# Patient Record
Sex: FEMALE | Race: WHITE | NOT HISPANIC OR LATINO | Employment: FULL TIME | URBAN - METROPOLITAN AREA
[De-identification: names, ages, dates, MRNs, and addresses within clinical notes are randomized per-mention and may not be internally consistent; named-entity substitution may affect disease eponyms.]

---

## 2017-01-27 ENCOUNTER — OFFICE VISIT (OUTPATIENT)
Dept: RHEUMATOLOGY | Facility: CLINIC | Age: 47
End: 2017-01-27
Payer: COMMERCIAL

## 2017-01-27 VITALS
HEART RATE: 105 BPM | SYSTOLIC BLOOD PRESSURE: 132 MMHG | DIASTOLIC BLOOD PRESSURE: 82 MMHG | HEIGHT: 69 IN | TEMPERATURE: 98 F | BODY MASS INDEX: 28.46 KG/M2 | WEIGHT: 192.13 LBS

## 2017-01-27 DIAGNOSIS — E55.9 VITAMIN D DEFICIENCY: ICD-10-CM

## 2017-01-27 DIAGNOSIS — R76.8 RHEUMATOID FACTOR POSITIVE: ICD-10-CM

## 2017-01-27 DIAGNOSIS — M79.645 BILATERAL THUMB PAIN: Primary | ICD-10-CM

## 2017-01-27 DIAGNOSIS — M25.572 CHRONIC PAIN OF BOTH ANKLES: ICD-10-CM

## 2017-01-27 DIAGNOSIS — G89.29 CHRONIC PAIN OF BOTH ANKLES: ICD-10-CM

## 2017-01-27 DIAGNOSIS — R53.83 FATIGUE, UNSPECIFIED TYPE: ICD-10-CM

## 2017-01-27 DIAGNOSIS — M25.571 CHRONIC PAIN OF BOTH ANKLES: ICD-10-CM

## 2017-01-27 DIAGNOSIS — M79.644 BILATERAL THUMB PAIN: Primary | ICD-10-CM

## 2017-01-27 PROCEDURE — 99999 PR PBB SHADOW E&M-EST. PATIENT-LVL III: CPT | Mod: PBBFAC,,, | Performed by: INTERNAL MEDICINE

## 2017-01-27 PROCEDURE — 1159F MED LIST DOCD IN RCRD: CPT | Mod: S$GLB,,, | Performed by: INTERNAL MEDICINE

## 2017-01-27 PROCEDURE — 99214 OFFICE O/P EST MOD 30 MIN: CPT | Mod: S$GLB,,, | Performed by: INTERNAL MEDICINE

## 2017-01-27 ASSESSMENT — ROUTINE ASSESSMENT OF PATIENT INDEX DATA (RAPID3)
PAIN SCORE: 4
WHEN YOU AWAKENED IN THE MORNING OVER THE LAST WEEK, PLEASE INDICATE THE AMOUNT OF TIME IT TAKES UNTIL YOU ARE AS LIMBER AS YOU WILL BE FOR THE DAY: 15 MINUTES
PSYCHOLOGICAL DISTRESS SCORE: 1.1
AM STIFFNESS SCORE: 1, YES
MDHAQ FUNCTION SCORE: 0
FATIGUE SCORE: 2.5

## 2017-01-27 NOTE — PROGRESS NOTES
"Subjective:       Patient ID: Kristnia Graham is a 46 y.o. female.    Chief Complaint: Hand Pain      HPI:  Kristina Graham is a 46 y.o. female History of positive RF and joint pain.  No treatment given. Returns due to joint pains. Pain bilateral thumbs and ankles.  Pain 3/10 ache. Improves on its own and worsens with movement regarding hands.  Ankles worsen at end of day (wears heels) and improves with elevation.   Morning stiffness for 1 hour. Several months of fatigue.  Difficulty falling asleep.      Left peroneal nerve compression on EMG for left toe numbness in 2011 patient declined surgery.     History of B12 deficiency, vitamin D deficiency and acne treated with spironolactone.  In 2010 Parvovirus and EBV infections. IgM positive for both.       Takes Lodine once a week and it helps hand pain 3-4/10 ache.  Worsened with activity.      New primary doctor Keli Dey MD did labs and thyroglobulin was elevated at 6.4 (0-0.9).  Had ultrasound and results pending.  Mother takes Synthroid.            Review of Systems   Constitutional: Positive for fatigue.   HENT: Negative.    Eyes:        Dry eyes   Respiratory: Negative.    Cardiovascular: Negative.    Gastrointestinal: Negative.    Endocrine: Negative.    Genitourinary: Negative.    Musculoskeletal: Positive for arthralgias.   Skin: Negative.    Allergic/Immunologic: Negative.    Neurological: Negative.    Hematological: Positive for adenopathy.   Psychiatric/Behavioral: Negative.          Objective:     Visit Vitals    /82 (BP Location: Left arm, Patient Position: Sitting, BP Method: Automatic)    Pulse 105    Temp 98.1 °F (36.7 °C) (Oral)    Ht 5' 9" (1.753 m)    Wt 87.1 kg (192 lb 1.6 oz)    BMI 28.37 kg/m2        Physical Exam   Constitutional: She is oriented to person, place, and time and well-developed, well-nourished, and in no distress.   HENT:   Head: Normocephalic and atraumatic.   Eyes: Conjunctivae and EOM are normal.   Neck: Neck " supple.   Cardiovascular: Normal rate, regular rhythm and normal heart sounds.    Pulmonary/Chest: Effort normal and breath sounds normal.   Abdominal: Soft. Bowel sounds are normal.   Neurological: She is alert and oriented to person, place, and time. Gait normal.   Skin: Skin is warm and dry.     Psychiatric: Mood and affect normal.   Musculoskeletal: She exhibits no edema, tenderness or deformity.                LABS    Component      Latest Ref Rng & Units 9/27/2016   WBC      3.90 - 12.70 K/uL 8.74   RBC      4.00 - 5.40 M/uL 4.76   Hemoglobin      12.0 - 16.0 g/dL 14.4   Hematocrit      37.0 - 48.5 % 42.8   MCV      82 - 98 fL 90   MCH      27.0 - 31.0 pg 30.3   MCHC      32.0 - 36.0 % 33.6   RDW      11.5 - 14.5 % 12.4   Platelets      150 - 350 K/uL 242   MPV      9.2 - 12.9 fL 10.1   Gran #      1.8 - 7.7 K/uL 5.8   Lymph #      1.0 - 4.8 K/uL 2.2   Mono #      0.3 - 1.0 K/uL 0.6   Eos #      0.0 - 0.5 K/uL 0.1   Baso #      0.00 - 0.20 K/uL 0.03   Gran%      38.0 - 73.0 % 65.9   Lymph%      18.0 - 48.0 % 25.5   Mono%      4.0 - 15.0 % 6.6   Eosinophil%      0.0 - 8.0 % 1.4   Basophil%      0.0 - 1.9 % 0.3   Differential Method       Automated   Sodium      136 - 145 mmol/L 137   Potassium      3.5 - 5.1 mmol/L 3.6   Chloride      95 - 110 mmol/L 102   CO2      23 - 29 mmol/L 24   Glucose      70 - 110 mg/dL 88   BUN, Bld      6 - 20 mg/dL 10   Creatinine      0.5 - 1.4 mg/dL 0.8   Calcium      8.7 - 10.5 mg/dL 9.3   Total Protein      6.0 - 8.4 g/dL 7.9   Albumin      3.5 - 5.2 g/dL 4.2   Total Bilirubin      0.1 - 1.0 mg/dL 0.8   Alkaline Phosphatase      55 - 135 U/L 59   AST      10 - 40 U/L 17   ALT      10 - 44 U/L 15   Anion Gap      8 - 16 mmol/L 11   eGFR if African American      >60 mL/min/1.73 m:2 >60.0   eGFR if non African American      >60 mL/min/1.73 m:2 >60.0   Hepatitis B Surface Ag       Negative   Hep B C IgM       Negative   Hep A IgM       Negative   Hepatitis C Ab       Negative    Anti-SSA Antibody      0.00 - 19.99 EU 0.33   Anti-SSA Interpretation      Negative Negative   Aldolase      1.2 - 7.6 U/L 2.0   YESI Screen      Negative <1:160 Negative <1:160   CPK      20 - 180 U/L 59   CRP      0.0 - 8.2 mg/L 0.8   HIV 1/2 Ag/Ab      Negative Negative   Rheumatoid Factor      0.0 - 15.0 IU/mL 18.0 (H)   Sed Rate      0 - 20 mm/Hr 16   TSH      0.400 - 4.000 uIU/mL 0.924   Vit D, 25-Hydroxy      30 - 96 ng/mL 28 (L)   ds DNA Ab      Negative 1:10 Negative 1:10   Vitamin B-12      210 - 950 pg/mL 778     Assessment:       1. Ankle pain.  Now resolved  2. Thumb pain.  Persists but improves with Lodine  3. Positive rheumatoid factor  4. Fatigue   5. Overweight (BMI 28.86).   6. Vitamin D deficiency.  Mildly low.  Currently on OTC 2,000 units daily  7. History vitamin B12 deficiency   Plan:       1. Labs  2. Continue Lodine. Risk of GI and cardiovascular side effects with NSAID use discussed with patient.      RTO 4 months/prn

## 2017-01-28 ENCOUNTER — PATIENT MESSAGE (OUTPATIENT)
Dept: RHEUMATOLOGY | Facility: CLINIC | Age: 47
End: 2017-01-28

## 2017-01-30 ENCOUNTER — PATIENT MESSAGE (OUTPATIENT)
Dept: RHEUMATOLOGY | Facility: CLINIC | Age: 47
End: 2017-01-30

## 2017-01-30 DIAGNOSIS — E04.1 THYROID NODULE: ICD-10-CM

## 2017-01-30 NOTE — TELEPHONE ENCOUNTER
Patient requesting endocrinology consult for thyroid nodule discovered by an outside provider.  We'll place consult.  Patient aware that she must bring all of the information to her endocrinology visit.  Patient aware that there may be a wait for an appointment in endocrinology.

## 2017-01-31 ENCOUNTER — TELEPHONE (OUTPATIENT)
Dept: ENDOCRINOLOGY | Facility: CLINIC | Age: 47
End: 2017-01-31

## 2017-01-31 NOTE — TELEPHONE ENCOUNTER
----- Message from Christina Krueger sent at 1/30/2017  9:13 AM CST -----  Contact: self   Pt called asking for advice about scheduling an biopsy appt. The Pt PCP Dr. Dey found a 3mm spot on her Thyroid and she does have the images from [facility] and will upload to chart. Pt was told she needs to have this done ASAP and needs the nurse to call her back to discuss further.    Pcp Dr. Dey [phone] if any questions for the provider.    Pt can be reached at [phone].

## 2017-02-02 ENCOUNTER — PATIENT MESSAGE (OUTPATIENT)
Dept: RHEUMATOLOGY | Facility: CLINIC | Age: 47
End: 2017-02-02

## 2017-02-09 ENCOUNTER — OFFICE VISIT (OUTPATIENT)
Dept: OTOLARYNGOLOGY | Facility: CLINIC | Age: 47
End: 2017-02-09
Payer: COMMERCIAL

## 2017-02-09 ENCOUNTER — LAB VISIT (OUTPATIENT)
Dept: LAB | Facility: HOSPITAL | Age: 47
End: 2017-02-09
Attending: OTOLARYNGOLOGY
Payer: COMMERCIAL

## 2017-02-09 ENCOUNTER — ANESTHESIA EVENT (OUTPATIENT)
Dept: SURGERY | Facility: HOSPITAL | Age: 47
End: 2017-02-09
Payer: COMMERCIAL

## 2017-02-09 VITALS
SYSTOLIC BLOOD PRESSURE: 140 MMHG | BODY MASS INDEX: 28.49 KG/M2 | DIASTOLIC BLOOD PRESSURE: 79 MMHG | HEART RATE: 75 BPM | WEIGHT: 192.88 LBS | TEMPERATURE: 98 F

## 2017-02-09 DIAGNOSIS — E04.2 NONTOXIC MULTINODULAR GOITER: ICD-10-CM

## 2017-02-09 LAB
ANION GAP SERPL CALC-SCNC: 7 MMOL/L
BASOPHILS # BLD AUTO: 0.04 K/UL
BASOPHILS NFR BLD: 0.4 %
BUN SERPL-MCNC: 10 MG/DL
CALCIUM SERPL-MCNC: 9.6 MG/DL
CHLORIDE SERPL-SCNC: 104 MMOL/L
CO2 SERPL-SCNC: 27 MMOL/L
CREAT SERPL-MCNC: 0.8 MG/DL
DIFFERENTIAL METHOD: NORMAL
EOSINOPHIL # BLD AUTO: 0.1 K/UL
EOSINOPHIL NFR BLD: 1.1 %
ERYTHROCYTE [DISTWIDTH] IN BLOOD BY AUTOMATED COUNT: 12.2 %
EST. GFR  (AFRICAN AMERICAN): >60 ML/MIN/1.73 M^2
EST. GFR  (NON AFRICAN AMERICAN): >60 ML/MIN/1.73 M^2
GLUCOSE SERPL-MCNC: 101 MG/DL
HCT VFR BLD AUTO: 43.4 %
HGB BLD-MCNC: 14.9 G/DL
LYMPHOCYTES # BLD AUTO: 2 K/UL
LYMPHOCYTES NFR BLD: 20.8 %
MCH RBC QN AUTO: 30.2 PG
MCHC RBC AUTO-ENTMCNC: 34.3 %
MCV RBC AUTO: 88 FL
MONOCYTES # BLD AUTO: 0.6 K/UL
MONOCYTES NFR BLD: 6.3 %
NEUTROPHILS # BLD AUTO: 6.9 K/UL
NEUTROPHILS NFR BLD: 71.1 %
PLATELET # BLD AUTO: 278 K/UL
PMV BLD AUTO: 9.6 FL
POTASSIUM SERPL-SCNC: 4.4 MMOL/L
PTH-INTACT SERPL-MCNC: 44 PG/ML
RBC # BLD AUTO: 4.94 M/UL
SODIUM SERPL-SCNC: 138 MMOL/L
T4 FREE SERPL-MCNC: 1.15 NG/DL
TSH SERPL DL<=0.005 MIU/L-ACNC: 0.69 UIU/ML
WBC # BLD AUTO: 9.74 K/UL

## 2017-02-09 PROCEDURE — 99204 OFFICE O/P NEW MOD 45 MIN: CPT | Mod: S$GLB,,, | Performed by: OTOLARYNGOLOGY

## 2017-02-09 PROCEDURE — 36415 COLL VENOUS BLD VENIPUNCTURE: CPT

## 2017-02-09 PROCEDURE — 99999 PR PBB SHADOW E&M-EST. PATIENT-LVL IV: CPT | Mod: PBBFAC,,, | Performed by: OTOLARYNGOLOGY

## 2017-02-09 PROCEDURE — 83970 ASSAY OF PARATHORMONE: CPT

## 2017-02-09 PROCEDURE — 84439 ASSAY OF FREE THYROXINE: CPT

## 2017-02-09 PROCEDURE — 85025 COMPLETE CBC W/AUTO DIFF WBC: CPT

## 2017-02-09 PROCEDURE — 80048 BASIC METABOLIC PNL TOTAL CA: CPT

## 2017-02-09 PROCEDURE — 84443 ASSAY THYROID STIM HORMONE: CPT

## 2017-02-09 RX ORDER — LIDOCAINE HYDROCHLORIDE 10 MG/ML
1 INJECTION, SOLUTION EPIDURAL; INFILTRATION; INTRACAUDAL; PERINEURAL ONCE
Status: CANCELLED | OUTPATIENT
Start: 2017-02-09 | End: 2017-02-09

## 2017-02-09 NOTE — LETTER
February 9, 2017      Flo Herman MD  [address] [place] [address] [address]  [address] [place] [address]           [facility]  [address]  [facility] [address]  Phone: [phone]  Fax: [phone]          Patient: Kristina Graham   MR Number: [id number]   YOB: 1970   Date of Visit: 2/9/2017       Dear Dr. Flo Herman:    Thank you for referring Kristina Graham to me for evaluation. Attached you will find relevant portions of my assessment and plan of care.    If you have questions, please do not hesitate to call me. I look forward to following Kristina Graham along with you.    Sincerely,    Kris Mondragon MD    Enclosure  CC:  No Recipients    If you would like to receive this communication electronically, please contact [email or web address] or [phone] to request more information on [employer] Link access.    For providers and/or their staff who would like to refer a patient to [facility], please contact us through our one-stop-shop provider referral line, [facility], at [phone].    If you feel you have received this communication in error or would no longer like to receive these types of communications, please e-mail [email or web address]

## 2017-02-09 NOTE — PROGRESS NOTES
Subjective:       Patient ID: Kristina Graham is a 46 y.o. female.    Chief Complaint: Thyroid Problem    HPI     [facility]    CC: thyroid nodule, multinodular goiter    HISTORY OF PRESENT ILLNESS:      Kristina Graham is a 46 y.o. year-old female who has been referred by Dr. Herman for evaluation of a 1 year history of a central neck mass/goiter. She first noted a mass in the central neck in 2016. She did not have an US until 2 weeks ago. She has bilateral nodules evident on ultrasound, with a dominant nodule evident on the left (2.5cm).  She has not had an FNA. She thinks that it is not getting larger. Recently, she has had some compressive symptoms such as a globus sensation and mild dysphagia. She has trouble sleeping on her back and has to sleep propped up to be comfortable. She feels pressure on her throat as well. She thinks that her voice is occasionally hoarse. She denies odynophagia, throat pain, and otalgia. She is a non-smoker. She admits to cough and throat clearing. There is not hemoptysis or hematemesis. She does not  experience shortness of breath occasionally. There is family history of thyroid disease, but not thyroid cancer. There is no personal history of radiation exposure or treatment to the head and neck region.    Dr. Herman performed a thyroid ultrasound, revealing a large thyroid with a dominant left sided nodule measuring 3.2cm, heterogeneous vascularity, and microcalcifications.     Past Medical History   Diagnosis Date    Arthritis     History of vitamin D deficiency 2016    Rheumatoid arthritis(714.0)        Past Surgical History   Procedure Laterality Date    Abdominal surgery       section, low transverse           Current Outpatient Prescriptions:     etodolac (LODINE XL) 400 MG 24 hr tablet, Take 1 tablet (400 mg total) by mouth once daily., Disp: 90 tablet, Rfl: 1    spironolactone (ALDACTONE) 25 MG tablet, Take 25 mg by  mouth once daily.  , Disp: , Rfl:     Review of patient's allergies indicates:   Allergen Reactions    Bactrim [sulfamethoxazole-trimethoprim]        Social History     Social History    Marital status:      Spouse name: N/A    Number of children: N/A    Years of education: N/A     Occupational History    Not on file.     Social History Main Topics    Smoking status: Never Smoker    Smokeless tobacco: Not on file      Comment: drug rep    Alcohol use Yes      Comment: social    Drug use: No    Sexual activity: Yes     Partners: Male     Birth control/ protection: Surgical     Other Topics Concern    Not on file     Social History Narrative       Family History   Problem Relation Age of Onset    Cancer Mother     Diabetes Mother     Hypertension Mother     Cataracts Mother     Cancer Father     Hypertension Father     Heart disease Father     Heart disease Paternal Grandfather     Hypertension Paternal Grandfather     Hypertension Sister     Cancer Maternal Aunt     Cancer Paternal Uncle     Hypertension Paternal Uncle     Cancer Maternal Grandmother     Diabetes Maternal Grandmother     Hypertension Maternal Grandmother     Glaucoma Maternal Grandmother     Heart disease Paternal Grandmother     Hypertension Paternal Grandmother     Glaucoma Maternal Grandfather      Review of Systems   Constitutional: Negative for activity change, appetite change, chills, diaphoresis, fatigue, fever and unexpected weight change.   HENT: Positive for trouble swallowing and voice change. Negative for congestion, dental problem, drooling, ear discharge, ear pain, facial swelling, hearing loss, mouth sores, nosebleeds, postnasal drip, rhinorrhea, sinus pressure, sneezing, sore throat and tinnitus.    Eyes: Negative for pain, discharge, redness, itching and visual disturbance.   Respiratory: Positive for cough. Negative for choking and shortness of breath.    Cardiovascular: Negative for chest pain,  palpitations and leg swelling.   Gastrointestinal: Negative for abdominal distention, abdominal pain, constipation, diarrhea, nausea and vomiting.   Endocrine: Negative for cold intolerance and heat intolerance.   Genitourinary: Negative for difficulty urinating, dysuria and hematuria.   Musculoskeletal: Negative for arthralgias, back pain, gait problem, myalgias, neck pain and neck stiffness.   Skin: Negative for rash and wound.   Allergic/Immunologic: Negative for environmental allergies and immunocompromised state.   Neurological: Negative for dizziness, facial asymmetry, speech difficulty, weakness, light-headedness, numbness and headaches.   Hematological: Negative for adenopathy. Does not bruise/bleed easily.   Psychiatric/Behavioral: Negative for dysphoric mood. The patient is not nervous/anxious.        Objective:      Physical Exam   Constitutional: She is oriented to person, place, and time. She appears well-developed and well-nourished. She is cooperative. She does not appear ill. No distress.   HENT:   Head: Normocephalic and atraumatic.   Right Ear: Hearing, tympanic membrane, external ear and ear canal normal.   Left Ear: Hearing, tympanic membrane, external ear and ear canal normal.   Nose: Septal deviation (subtle twist) present. No mucosal edema, rhinorrhea or nasal deformity. No epistaxis.  No foreign bodies. Right sinus exhibits no maxillary sinus tenderness and no frontal sinus tenderness. Left sinus exhibits no maxillary sinus tenderness and no frontal sinus tenderness.   Mouth/Throat: Uvula is midline, oropharynx is clear and moist and mucous membranes are normal. Mucous membranes are not pale, not dry and not cyanotic. She does not have dentures. No oral lesions. No trismus in the jaw. Normal dentition. No uvula swelling or dental caries. No oropharyngeal exudate, posterior oropharyngeal edema, posterior oropharyngeal erythema or tonsillar abscesses.   Procedure: Flexible laryngoscopy  In  order to fully examine the upper aerodigestive tract, including the larynx, in a patient with a hyperactive gag reflex, flexible endoscopy is required.  After explaining the procedure and obtaining verbal consent, a timeout was performed with the patient's participation according to the universal protocol. Both nasal cavities were anesthetized with 4% Xylocaine spray mixed with Toan-Synephrine. The flexible laryngoscope (#[id number]) was inserted into the nasal cavity and advanced to visualize the nasal cavity, nasopharynx, the posterior oropharynx, hypopharynx, and the endolarynx with the above findings noted. The scope was removed and the procedure terminated. The patient tolerated this procedure well without apparent complication.      FINDINGS  Nasopharynx - the torus is clear. There are no lesions of the posterior wall.   Oropharynx - no lesions of the tongue base. There is no obvious fullness or asymmetry.  Hypopharynx - there are no lesions of the pyriform sinuses or postcricoid region    Larynx - there are no lesions of the supraglottic or glottic larynx. Vocal fold mobility is normal with complete closure.      Eyes: Conjunctivae are normal. Right eye exhibits no discharge. Left eye exhibits no discharge. No scleral icterus.   Neck: Trachea normal, normal range of motion and phonation normal. Neck supple. No JVD present. No tracheal tenderness present. No tracheal deviation present. Thyromegaly present. No thyroid mass present.   Salivary glands - there are no lesions or asymmetric findings in the submandibular or parotid glands     Cardiovascular: Normal rate.    Pulmonary/Chest: Effort normal. No stridor. No respiratory distress.   Lymphadenopathy:        Head (right side): No submental, no submandibular, no tonsillar and no preauricular adenopathy present.        Head (left side): No submental, no submandibular, no tonsillar and no preauricular adenopathy present.     She has no cervical adenopathy.    Neurological: She is alert and oriented to person, place, and time. No cranial nerve deficit.   Skin: Skin is warm and dry. No rash noted. She is not diaphoretic. No erythema. No pallor.   Psychiatric: She has a normal mood and affect. Her behavior is normal. Thought content normal.   Vitals reviewed.      Thyroid US and Endocrinology Notes reviewed in EPIC    Assessment and Plan:       Problem List Items Addressed This Visit     Nontoxic multinodular goiter     Based on her outside ultrasound, Ms. Garham has a dominant left sided nodule in the setting of a large, symptomatic multinodular goiter. Her options include observation, US-FNA, and surgery. We discussed the relative merits of biopsy in how it could shape a recommendation for surgery or the extent of surgery, but her large, symptomatic gland and the nodule size render FNA slightly less accurate and anything less than a total thyroidectomy fraught with potential for not improving her symptoms.    She flatly stated a desire to have a total thyroidectomy, and this is exactly what she had been counseled by Dr. Herman. I explained the risks of thyroidectomy include, but are not limited to, infection, bleeding, scarring, failure to achieve the diagnosis, no evidence of cancer, recurrence, collection of blood or tissue fluid requiring drainage, injury to the recurrent laryngeal nerve with resultant temporary or permanent hoarseness (1% permanent risk with up to 10% temporary risk, greater in revision operations), injury to the superior laryngeal nerve with resultant loss of the upper register for singing or challenges with yelling, temporary or permanent hypocalcemia related to injury or devascularization of the parathyroid glands (less than 5% permanent, up to 30-60% when paratracheal dissection is accomplished, again greater in revision operations), and the need for additional procedures or therapies.  Time was allowed for questions, and all questions were  answered to the patient's apparent satisfaction. The risks of paratracheal lymph node dissection are included above. Informed consent was obtained.             Relevant Orders    Basic metabolic panel    CBC auto differential    TSH    PTH, intact    T4, free    Case Request Operating Room: THYROIDECTOMY (Completed)

## 2017-02-09 NOTE — PRE ADMISSION SCREENING
Anesthesia Assessment: Preoperative EQUATION    Planned Procedure: Procedure(s) (LRB):  THYROIDECTOMY (Bilateral)  Requested Anesthesia Type:General  Surgeon: Kris Mondragon MD  Service: ENT  Known or anticipated Date of Surgery:2/20/2017    Surgeon notes: reviewed    Electronic QUestionnaire Assessment completed via nurse interview with patient.        Kristina Graham [[id number]] - 46 y.o. Female        Providers Outside of [facility]      No data to display       Surgical Risk Level      Surgical Risk Level:  2           caRDScore (Clinical Anesthesia Rapid Decision Score)        Low  Total Score: 15      15 Sum of Clinical Scores       caRDScores (Grouped)      caRDScore - Ane:  7                caRDScore - CVD:  0                caRDScore - Pul:  0                caRDScore - Met:  5                caRDScore - Phy:  3           caRDScore Items           Pre-admit from 2/20/2017 in [facility]     Anesthesia      Has large thyroid (goiter)  Yes     Has enlarged neck gland, lump or tumor, neck swelling  Yes     Has trouble swallowing / handling saliva  Yes     CVD      Activity similar to best ability for maximal activity or exercise  METS 4     Pulmonary      Metabolic      Thyroid disease (Specify)  Yes     Endocrine disease is unstable or not well controlled  Yes [multinodular goiter]     Physiologic      Obesity Status  Not Obese (BMI <30)     Has rheumatoid arthritis  Yes [Denies neck involvement-takes prn Lodine for pain]       Flags      Red Flag Score:  1                Yellow Flag Score:  7           Red Flags           Pre-admit from 2/20/2017 in [facility]     Obesity Status  Not Obese (BMI <30)     Has large thyroid (goiter)  Yes       Yellow Flags           Pre-admit from 2/20/2017 in [facility]     Has had surgery within the last 3 months  Yes [D & C 12/2016]     Takes herbal medications or vitamin supplements  Yes [vit b]     Obesity Status   Not Obese (BMI <30)     NSAID  Yes [occ lodine]     Has enlarged neck gland, lump or tumor, neck swelling  Yes     Has rheumatoid arthritis  Yes [Denies neck involvement-takes prn Lodine for pain]     Has pain  Yes     Endocrine disease is unstable or not well controlled  Yes [multinodular goiter]       PONV Risk Score (assumes periop narcotic use = +1, Max=4)      PONV Risk Score:  3           PONV Risk Factors  Total Score: 2      1 Female     1 Non-Smoker at present       Sleep Apnea  Total Score: 0        RASHIDA STOP-Bang Risk Factors (Max=8)  Total Score: 0        RASHIDA Risk Level - 1 (Low), 2 (Moderate), 3 (High)      RASHIDA Risk Level:  0           RCRI (Revised Cardiac Risk Indices of ACC/AHA guidelines, Max=6)  Total Score: 0        CAD Risk Factors  Total Score: 1      1 Exercise on a routine basis       CHADS Score if applicable (history of atrial fib/flutter, Max=6)  Total Score: 0        Maximal Exercise Capacity           Pre-admit from 2/20/2017 in [facility]     Maximal Exercise Capacity  METS 4       Summary of Dependence  Total Score: 1      1 Is totally independent of others for activities of daily living       Phone Fraility Score (Max = 17)  Total Score: 0        Pain Factors           Pre-admit from 2/20/2017 in [facility]     Has pain  Yes     Location and description of pain  occ arthritis     Typical Pain Scores  0 to 4     Not using strong pain medications  Yes [prn lodine]       Risk Triggers (Evidence-Based Risk Triggers)        Pulmonary Risk Triggers  Total Score: 1      1 Has trouble swallowing / handling saliva       Renal Risk Triggers  Total Score: 0        Delirium Risk Triggers  Total Score: 0        Urologic Risk Triggers  Total Score: 0        Logistics        Pre-op Clinic Logistics  Total Score: 7      1 Has had surgery within the last 3 months     2 Takes herbal medications or vitamin supplements     1 Has had anesthesia, either as adult or as  a child     2 NSAID     1 Has rheumatoid arthritis       [facility] Logistics  Total Score: 5      1 NSAID     2 Has large thyroid (goiter)     2 Has trouble swallowing / handling saliva       Discharge Logistics  Total Score: 2      1 Has rheumatoid arthritis     1 Has trouble swallowing / handling saliva       Discharge Planning           Pre-admit from 2/20/2017 in [facility]     Discharge Planning      Will assist patient 24/7, if needed       Who will transport you to therapy, if need         Anes <For office use only>      Total Score: 12        Surgical Risk Level Assessment                 Triage considerations:     The patient has no apparent active cardiac condition (No unstable coronary Syndrome such as severe unstable angina or recent [<1 month] myocardial infarction, decompensated CHF, severe valvular   disease or significant arrhythmia)    Previous anesthesia records:[facility], [facility], No problems and Not available    Last PCP note: > 1 year ago , outside [facility]   Subspecialty notes: Rheumatology    Other important co-morbidities: Goiter, RA     Tests already available:  Available tests,  within 1 month , within [facility] . pending- PTH, T4, TSH, CBC and BMP 2/9.   2011 EKG.   9/2016 Arthritis survey xray - Cervical spine, lateral view in flexion:  Vertebral body heights, alignment, disc spaces, preodontoid space and prevertebral soft tissues appear unremarkable.             Instructions given. (See in Nurse's note)    Optimization:  Anesthesia Preop Clinic Assessment  Indicated-no POC required for this procedure       Plan:    Testing:  none     Patient  has previously scheduled Medical Appointment:none    Navigation:            Results will be tracked by Preop Clinic. (2/9 labs pending)                 Straight Line to surgery.

## 2017-02-09 NOTE — ASSESSMENT & PLAN NOTE
Based on her outside ultrasound, Ms. Graham has a dominant left sided nodule in the setting of a large, symptomatic multinodular goiter. Her options include observation, US-FNA, and surgery. We discussed the relative merits of biopsy in how it could shape a recommendation for surgery or the extent of surgery, but her large, symptomatic gland and the nodule size render FNA slightly less accurate and anything less than a total thyroidectomy fraught with potential for not improving her symptoms.    She flatly stated a desire to have a total thyroidectomy, and this is exactly what she had been counseled by Dr. Herman. I explained the risks of thyroidectomy include, but are not limited to, infection, bleeding, scarring, failure to achieve the diagnosis, no evidence of cancer, recurrence, collection of blood or tissue fluid requiring drainage, injury to the recurrent laryngeal nerve with resultant temporary or permanent hoarseness (1% permanent risk with up to 10% temporary risk, greater in revision operations), injury to the superior laryngeal nerve with resultant loss of the upper register for singing or challenges with yelling, temporary or permanent hypocalcemia related to injury or devascularization of the parathyroid glands (less than 5% permanent, up to 30-60% when paratracheal dissection is accomplished, again greater in revision operations), and the need for additional procedures or therapies.  Time was allowed for questions, and all questions were answered to the patient's apparent satisfaction. The risks of paratracheal lymph node dissection are included above. Informed consent was obtained.

## 2017-02-09 NOTE — ANESTHESIA PREPROCEDURE EVALUATION
Pre Admission Screening  Cheli Mccullough RN      []Hide copied text  Anesthesia Assessment: Preoperative EQUATION     Planned Procedure: Procedure(s) (LRB):  THYROIDECTOMY (Bilateral)  Requested Anesthesia Type:General  Surgeon: Kris Mondragon MD  Service: ENT  Known or anticipated Date of Surgery:2/20/2017     Surgeon notes: reviewed     Electronic QUestionnaire Assessment completed via nurse interview with patient.                    Kristina Graham [[id number]] - 46 y.o. Female         Providers Outside of [facility]       No data to display        Surgical Risk Level       Surgical Risk Level:   2              caRDScore (Clinical Anesthesia Rapid Decision Score)         Low  Total Score: 15       15 Sum of Clinical Scores        caRDScores (Grouped)       caRDScore - Ane:   7                       caRDScore - CVD:   0                       caRDScore - Pul:   0                       caRDScore - Met:   5                       caRDScore - Phy:   3              caRDScore Items             Pre-admit from 2/20/2017 in [facility]      Anesthesia        Has large thyroid (goiter)   Yes      Has enlarged neck gland, lump or tumor, neck swelling   Yes      Has trouble swallowing / handling saliva   Yes      CVD        Activity similar to best ability for maximal activity or exercise   METS 4      Pulmonary        Metabolic        Thyroid disease (Specify)   Yes      Endocrine disease is unstable or not well controlled   Yes [multinodular goiter]      Physiologic        Obesity Status   Not Obese (BMI <30)      Has rheumatoid arthritis   Yes [Denies neck involvement-takes prn Lodine for pain]        Flags       Red Flag Score:   1                       Yellow Flag Score:   7              Red Flags             Pre-admit from 2/20/2017 in [facility]      Obesity Status   Not Obese (BMI <30)      Has large thyroid (goiter)   Yes        Yellow Flags             Pre-admit from 2/20/2017  in [facility]      Has had surgery within the last 3 months   Yes [D & C 12/2016]      Takes herbal medications or vitamin supplements   Yes [vit b]      Obesity Status   Not Obese (BMI <30)      NSAID   Yes [occ lodine]      Has enlarged neck gland, lump or tumor, neck swelling   Yes      Has rheumatoid arthritis   Yes [Denies neck involvement-takes prn Lodine for pain]      Has pain   Yes      Endocrine disease is unstable or not well controlled   Yes [multinodular goiter]        PONV Risk Score (assumes periop narcotic use = +1, Max=4)       PONV Risk Score:   3              PONV Risk Factors  Total Score: 2       1 Female      1 Non-Smoker at present        Sleep Apnea  Total Score: 0         RASHIDA STOP-Bang Risk Factors (Max=8)  Total Score: 0         RASHIDA Risk Level - 1 (Low), 2 (Moderate), 3 (High)       RASHIDA Risk Level:   0              RCRI (Revised Cardiac Risk Indices of ACC/AHA guidelines, Max=6)  Total Score: 0         CAD Risk Factors  Total Score: 1       1 Exercise on a routine basis        CHADS Score if applicable (history of atrial fib/flutter, Max=6)  Total Score: 0         Maximal Exercise Capacity             Pre-admit from 2/20/2017 in [facility]      Maximal Exercise Capacity   METS 4        Summary of Dependence  Total Score: 1       1 Is totally independent of others for activities of daily living        Phone Fraility Score (Max = 17)  Total Score: 0         Pain Factors             Pre-admit from 2/20/2017 in [facility]      Has pain   Yes      Location and description of pain   occ arthritis      Typical Pain Scores   0 to 4      Not using strong pain medications   Yes [prn lodine]        Risk Triggers (Evidence-Based Risk Triggers)         Pulmonary Risk Triggers  Total Score: 1       1 Has trouble swallowing / handling saliva        Renal Risk Triggers  Total Score: 0         Delirium Risk Triggers  Total Score: 0         Urologic  Risk Triggers  Total Score: 0         Logistics         Pre-op Clinic Logistics  Total Score: 7       1 Has had surgery within the last 3 months      2 Takes herbal medications or vitamin supplements      1 Has had anesthesia, either as adult or as a child      2 NSAID      1 Has rheumatoid arthritis        [facility] Logistics  Total Score: 5       1 NSAID      2 Has large thyroid (goiter)      2 Has trouble swallowing / handling saliva        Discharge Logistics  Total Score: 2       1 Has rheumatoid arthritis      1 Has trouble swallowing / handling saliva        Discharge Planning             Pre-admit from 2/20/2017 in [facility]      Discharge Planning        Will assist patient 24/7, if needed         Who will transport you to therapy, if need           Anes <For office use only>       Total Score: 12          Surgical Risk Level Assessment                       Triage considerations:      The patient has no apparent active cardiac condition (No unstable coronary Syndrome such as severe unstable angina or recent [<1 month] myocardial infarction, decompensated CHF, severe valvular disease or significant arrhythmia)     Previous anesthesia records:GETA, MAC, No problems and Not available     Last PCP note: > 1 year ago , outside [facility]   Subspecialty notes: Rheumatology     Other important co-morbidities: Goiter, RA      Tests already available:  Available tests, within 1 month , within [facility] . pending- PTH, T4, TSH, CBC and BMP 2/9.  2011 EKG.  9/2016 Arthritis survey xray - Cervical spine, lateral view in flexion:  Vertebral body heights, alignment, disc spaces, preodontoid space and prevertebral soft tissues appear unremarkable.       Instructions given. (See in Nurse's note)     Optimization:  Anesthesia Preop Clinic Assessment Indicated-no POC required for this procedure      Plan:   Testing: none  Patient has previously scheduled Medical Appointment:none     Navigation:    Results will be tracked by Preop Clinic.   2/10/17-lab results 2/9 reviewed      Straight Line to surgery.       Electronically signed by Cheli Mccullough RN at 2/9/2017 11:01 AM        Pre-admit on 2/20/2017              Detailed Report                                                                                                                      02/09/2017  Kristina Graham is a 46 y.o., female.    [facility] Anesthesia Evaluation    I have reviewed the Patient Summary Reports.    I have reviewed the Nursing Notes.   I have reviewed the Medications.     Review of Systems  Anesthesia Hx:  No previous Anesthesia  Neg history of prior surgery. Denies Family Hx of Anesthesia complications.   Denies Personal Hx of Anesthesia complications.   Social:  Non-Smoker, No Alcohol Use    Hematology/Oncology:  Hematology Normal   Oncology Normal     EENT/Dental:   Nontoxic Goiter   Cardiovascular:  Cardiovascular Normal Exercise tolerance: good     Pulmonary:  Pulmonary Normal    Renal/:  Renal/ Normal     Hepatic/GI:  Hepatic/GI Normal    Musculoskeletal:  Musculoskeletal Normal    Neurological:  Neurology Normal    Endocrine:   Hyperthyroidism  Thyroid Disease, Goiter Toxic Nodule Current functional status of Euthyroid    Dermatological:  Skin Normal    Psych:  Psychiatric Normal           Physical Exam  General:  Well nourished    Airway/Jaw/Neck:  Airway Findings: Mouth Opening: Normal Tongue: Normal  General Airway Assessment: Pediatric, Adult  TM Distance: Normal, at least 6 cm  Jaw/Neck Findings:  Micrognathia: Negative Neck ROM: Normal ROM      Dental:  Dental Findings: In tact   Chest/Lungs:  Chest/Lungs Findings: Clear to auscultation, Normal Respiratory Rate     Heart/Vascular:  Heart Findings: Rate: Normal  Rhythm: Regular Rhythm  Sounds: Normal  Heart murmur: negative    Abdomen:  Abdomen Findings:  Normal, Nontender, Soft       Mental Status:  Mental Status Findings:  Cooperative, Alert and Oriented          Anesthesia Plan  Type of Anesthesia, risks & benefits discussed:  Anesthesia Type:  general  Patient's Preference:   Intra-op Monitoring Plan: standard ASA monitors  Intra-op Monitoring Plan Comments:   Post Op Pain Control Plan:   Post Op Pain Control Plan Comments:   Induction:   IV  Beta Blocker:  Patient is not currently on a Beta-Blocker (No further documentation required).       Informed Consent: Patient understands risks and agrees with Anesthesia plan.  Questions answered. Anesthesia consent signed with patient.  ASA Score: 2     Day of Surgery Review of History & Physical:    H&P update referred to the surgeon.         Ready For Surgery From Anesthesia Perspective.

## 2017-02-09 NOTE — PATIENT INSTRUCTIONS
Do not eat or drink anything after midnight on the evening of surgery.  Please stop plavix and aspirin 2 weeks before surgery.  For your day of surgery, please come to to [facility] 2nd floor of the main hospital - follow the signs.

## 2017-02-09 NOTE — MR AVS SNAPSHOT
[facility]  [address]  [facility] [address]  Phone: [phone]  Fax: [phone]                  Kristina Garnettlelo   2017 8:30 AM   Office Visit    Description:  Female : 1970   Provider:  Kris Mondragon MD   Department:  [facility]           Reason for Visit     Thyroid Problem           Diagnoses this Visit        Comments    Nontoxic multinodular goiter                To Do List           Goals (5 Years of Data)     None      Follow-Up and Disposition     Return in 11 days (on 2017).      [facility]     [facility] On Call Nurse Care Line -  Assistance  Registered nurses in the [facility] On Call Center provide clinical advisement, health education, appointment booking, and other advisory services.  Call for this free service at [phone].             Medications           Message regarding Medications     Verify the changes and/or additions to your medication regime listed below are the same as discussed with your clinician today.  If any of these changes or additions are incorrect, please notify your healthcare provider.             Verify that the below list of medications is an accurate representation of the medications you are currently taking.  If none reported, the list may be blank. If incorrect, please contact your healthcare provider. Carry this list with you in case of emergency.           Current Medications     etodolac (LODINE XL) 400 MG 24 hr tablet Take 1 tablet (400 mg total) by mouth once daily.    spironolactone (ALDACTONE) 25 MG tablet Take 25 mg by mouth once daily.             Clinical Reference Information           Your Vitals Were     BP Pulse Temp Weight BMI    140/79 75 97.6 °F (36.4 °C) 87.5 kg (192 lb 14.4 oz) 28.49 kg/m2      Blood Pressure          Most Recent Value    BP  (!)  140/79      Allergies as of 2017     Bactrim [Sulfamethoxazole-trimethoprim]      Immunizations Administered on Date  of Encounter - 2/9/2017     None      Orders Placed During Today's Visit      Normal Orders This Visit    Case Request Operating Room: THYROIDECTOMY     Future Labs/Procedures Expected by Expires    Basic metabolic panel  2/9/2017 2/9/2018    CBC auto differential  2/9/2017 2/9/2018    PTH, intact  2/9/2017 2/9/2018    T4, free  2/9/2017 4/10/2018    TSH  2/9/2017 2/9/2018      Instructions    Do not eat or drink anything after midnight on the evening of surgery.  Please stop plavix and aspirin 2 weeks before surgery.  For your day of surgery, please come to to [facility] 2nd [facility] of the main hospital - follow the signs.         Language Assistance Services     ATTENTION: Language assistance services are available, free of charge. Please call [phone].      ATENCIÓN: Si twanla dallas, tiene a peñaloza disposición servicios gratuitos de asistencia lingüística. Llame al [phone].     CHÚ Ý: N?u b?n nói Ti?ng Vi?t, có các d?ch v? h? tr? ngôn ng? mi?n phí dành cho b?n. G?i s? [phone].         [facility] complies with applicable Federal civil rights laws and does not discriminate on the basis of race, color, national origin, age, disability, or sex.

## 2017-02-16 ENCOUNTER — TELEPHONE (OUTPATIENT)
Dept: OTOLARYNGOLOGY | Facility: CLINIC | Age: 47
End: 2017-02-16

## 2017-02-17 ENCOUNTER — TELEPHONE (OUTPATIENT)
Dept: OTOLARYNGOLOGY | Facility: CLINIC | Age: 47
End: 2017-02-17

## 2017-02-17 NOTE — TELEPHONE ENCOUNTER
Spoke to Mrs Graham regarding surgery arrival time for Monday, February 20, 2017 instructed to arrive at 0830 and remain NPO after MN she verbalized understanding

## 2017-02-17 NOTE — TELEPHONE ENCOUNTER
----- Message from Abner Lenz sent at 2/17/2017  9:37 AM CST -----  Contact: [phone]  Please follow up with pt regarding scheduled surgery arrival time for Monday

## 2017-02-20 ENCOUNTER — HOSPITAL ENCOUNTER (OUTPATIENT)
Facility: HOSPITAL | Age: 47
Discharge: HOME | End: 2017-02-22
Attending: OTOLARYNGOLOGY | Admitting: OTOLARYNGOLOGY
Payer: COMMERCIAL

## 2017-02-20 ENCOUNTER — ANESTHESIA (OUTPATIENT)
Dept: SURGERY | Facility: HOSPITAL | Age: 47
End: 2017-02-20
Payer: COMMERCIAL

## 2017-02-20 DIAGNOSIS — E04.2 NONTOXIC MULTINODULAR GOITER: Primary | ICD-10-CM

## 2017-02-20 LAB
B-HCG UR QL: NEGATIVE
CA-I BLDV-SCNC: 1.05 MMOL/L
CTP QC/QA: YES

## 2017-02-20 PROCEDURE — 25000003 PHARM REV CODE 250: Performed by: NURSE ANESTHETIST, CERTIFIED REGISTERED

## 2017-02-20 PROCEDURE — 25000003 PHARM REV CODE 250: Performed by: OTOLARYNGOLOGY

## 2017-02-20 PROCEDURE — 88305 TISSUE EXAM BY PATHOLOGIST: CPT | Mod: 26,,,

## 2017-02-20 PROCEDURE — 71000033 HC RECOVERY, INTIAL HOUR: Performed by: OTOLARYNGOLOGY

## 2017-02-20 PROCEDURE — 63600175 PHARM REV CODE 636 W HCPCS: Performed by: OTOLARYNGOLOGY

## 2017-02-20 PROCEDURE — 63600175 PHARM REV CODE 636 W HCPCS: Performed by: NURSE ANESTHETIST, CERTIFIED REGISTERED

## 2017-02-20 PROCEDURE — 88305 TISSUE EXAM BY PATHOLOGIST: CPT

## 2017-02-20 PROCEDURE — 71000039 HC RECOVERY, EACH ADD'L HOUR: Performed by: OTOLARYNGOLOGY

## 2017-02-20 PROCEDURE — 36000707: Performed by: OTOLARYNGOLOGY

## 2017-02-20 PROCEDURE — 27000221 HC OXYGEN, UP TO 24 HOURS

## 2017-02-20 PROCEDURE — 88331 PATH CONSLTJ SURG 1 BLK 1SPC: CPT | Mod: 26,,,

## 2017-02-20 PROCEDURE — 81025 URINE PREGNANCY TEST: CPT | Performed by: OTOLARYNGOLOGY

## 2017-02-20 PROCEDURE — 37000008 HC ANESTHESIA 1ST 15 MINUTES: Performed by: OTOLARYNGOLOGY

## 2017-02-20 PROCEDURE — 82330 ASSAY OF CALCIUM: CPT

## 2017-02-20 PROCEDURE — 63600175 PHARM REV CODE 636 W HCPCS: Performed by: ANESTHESIOLOGY

## 2017-02-20 PROCEDURE — D9220A PRA ANESTHESIA: Mod: CRNA,,, | Performed by: NURSE ANESTHETIST, CERTIFIED REGISTERED

## 2017-02-20 PROCEDURE — D9220A PRA ANESTHESIA: Mod: ANES,,, | Performed by: ANESTHESIOLOGY

## 2017-02-20 PROCEDURE — 88307 TISSUE EXAM BY PATHOLOGIST: CPT | Mod: 26,,,

## 2017-02-20 PROCEDURE — 27201423 OPTIME MED/SURG SUP & DEVICES STERILE SUPPLY: Performed by: OTOLARYNGOLOGY

## 2017-02-20 PROCEDURE — 25000003 PHARM REV CODE 250

## 2017-02-20 PROCEDURE — 36000706: Performed by: OTOLARYNGOLOGY

## 2017-02-20 PROCEDURE — 37000009 HC ANESTHESIA EA ADD 15 MINS: Performed by: OTOLARYNGOLOGY

## 2017-02-20 PROCEDURE — 60240 REMOVAL OF THYROID: CPT | Mod: ,,, | Performed by: OTOLARYNGOLOGY

## 2017-02-20 RX ORDER — LIDOCAINE HCL/PF 100 MG/5ML
SYRINGE (ML) INTRAVENOUS
Status: DISCONTINUED | OUTPATIENT
Start: 2017-02-20 | End: 2017-02-20

## 2017-02-20 RX ORDER — MIDAZOLAM HYDROCHLORIDE 1 MG/ML
INJECTION, SOLUTION INTRAMUSCULAR; INTRAVENOUS
Status: DISCONTINUED | OUTPATIENT
Start: 2017-02-20 | End: 2017-02-20

## 2017-02-20 RX ORDER — ESMOLOL HYDROCHLORIDE 10 MG/ML
INJECTION INTRAVENOUS
Status: DISCONTINUED | OUTPATIENT
Start: 2017-02-20 | End: 2017-02-20

## 2017-02-20 RX ORDER — PROPOFOL 10 MG/ML
VIAL (ML) INTRAVENOUS
Status: DISCONTINUED | OUTPATIENT
Start: 2017-02-20 | End: 2017-02-20

## 2017-02-20 RX ORDER — ENOXAPARIN SODIUM 100 MG/ML
40 INJECTION SUBCUTANEOUS
Status: DISCONTINUED | OUTPATIENT
Start: 2017-02-21 | End: 2017-02-22 | Stop reason: HOSPADM

## 2017-02-20 RX ORDER — OXYCODONE AND ACETAMINOPHEN 5; 325 MG/1; MG/1
1 TABLET ORAL EVERY 4 HOURS PRN
Status: DISCONTINUED | OUTPATIENT
Start: 2017-02-20 | End: 2017-02-22 | Stop reason: HOSPADM

## 2017-02-20 RX ORDER — DEXAMETHASONE SODIUM PHOSPHATE 4 MG/ML
INJECTION, SOLUTION INTRA-ARTICULAR; INTRALESIONAL; INTRAMUSCULAR; INTRAVENOUS; SOFT TISSUE
Status: DISCONTINUED | OUTPATIENT
Start: 2017-02-20 | End: 2017-02-20

## 2017-02-20 RX ORDER — ACETAMINOPHEN 325 MG/1
650 TABLET ORAL EVERY 8 HOURS PRN
Status: DISCONTINUED | OUTPATIENT
Start: 2017-02-20 | End: 2017-02-22 | Stop reason: HOSPADM

## 2017-02-20 RX ORDER — ONDANSETRON 2 MG/ML
INJECTION INTRAMUSCULAR; INTRAVENOUS
Status: DISCONTINUED | OUTPATIENT
Start: 2017-02-20 | End: 2017-02-20

## 2017-02-20 RX ORDER — LEVOTHYROXINE SODIUM 75 UG/1
150 TABLET ORAL
Status: DISCONTINUED | OUTPATIENT
Start: 2017-02-21 | End: 2017-02-22 | Stop reason: HOSPADM

## 2017-02-20 RX ORDER — LIDOCAINE HYDROCHLORIDE AND EPINEPHRINE 10; 10 MG/ML; UG/ML
INJECTION, SOLUTION INFILTRATION; PERINEURAL
Status: DISCONTINUED | OUTPATIENT
Start: 2017-02-20 | End: 2017-02-20 | Stop reason: HOSPADM

## 2017-02-20 RX ORDER — ONDANSETRON 8 MG/1
8 TABLET, ORALLY DISINTEGRATING ORAL EVERY 8 HOURS PRN
Status: DISCONTINUED | OUTPATIENT
Start: 2017-02-20 | End: 2017-02-22 | Stop reason: HOSPADM

## 2017-02-20 RX ORDER — SODIUM CHLORIDE 0.9 % (FLUSH) 0.9 %
3 SYRINGE (ML) INJECTION
Status: DISCONTINUED | OUTPATIENT
Start: 2017-02-20 | End: 2017-02-20

## 2017-02-20 RX ORDER — SUCCINYLCHOLINE CHLORIDE 20 MG/ML
INJECTION INTRAMUSCULAR; INTRAVENOUS
Status: DISCONTINUED | OUTPATIENT
Start: 2017-02-20 | End: 2017-02-20

## 2017-02-20 RX ORDER — SODIUM CHLORIDE 0.9 % (FLUSH) 0.9 %
3 SYRINGE (ML) INJECTION EVERY 8 HOURS
Status: DISCONTINUED | OUTPATIENT
Start: 2017-02-20 | End: 2017-02-22 | Stop reason: HOSPADM

## 2017-02-20 RX ORDER — ONDANSETRON 2 MG/ML
4 INJECTION INTRAMUSCULAR; INTRAVENOUS DAILY PRN
Status: DISCONTINUED | OUTPATIENT
Start: 2017-02-20 | End: 2017-02-20

## 2017-02-20 RX ORDER — ROCURONIUM BROMIDE 10 MG/ML
INJECTION, SOLUTION INTRAVENOUS
Status: DISCONTINUED | OUTPATIENT
Start: 2017-02-20 | End: 2017-02-20

## 2017-02-20 RX ORDER — GLYCOPYRROLATE 0.2 MG/ML
INJECTION INTRAMUSCULAR; INTRAVENOUS
Status: DISCONTINUED | OUTPATIENT
Start: 2017-02-20 | End: 2017-02-20

## 2017-02-20 RX ORDER — HYDROMORPHONE HYDROCHLORIDE 1 MG/ML
0.2 INJECTION, SOLUTION INTRAMUSCULAR; INTRAVENOUS; SUBCUTANEOUS EVERY 5 MIN PRN
Status: DISCONTINUED | OUTPATIENT
Start: 2017-02-20 | End: 2017-02-20 | Stop reason: HOSPADM

## 2017-02-20 RX ORDER — FENTANYL CITRATE 50 UG/ML
INJECTION, SOLUTION INTRAMUSCULAR; INTRAVENOUS
Status: DISCONTINUED | OUTPATIENT
Start: 2017-02-20 | End: 2017-02-20

## 2017-02-20 RX ORDER — SODIUM CHLORIDE 0.9 % (FLUSH) 0.9 %
3 SYRINGE (ML) INJECTION EVERY 8 HOURS
Status: DISCONTINUED | OUTPATIENT
Start: 2017-02-20 | End: 2017-02-20

## 2017-02-20 RX ORDER — PROMETHAZINE HYDROCHLORIDE 25 MG/ML
INJECTION, SOLUTION INTRAMUSCULAR; INTRAVENOUS
Status: DISPENSED
Start: 2017-02-20 | End: 2017-02-21

## 2017-02-20 RX ORDER — SODIUM CHLORIDE 9 MG/ML
INJECTION, SOLUTION INTRAVENOUS CONTINUOUS PRN
Status: DISCONTINUED | OUTPATIENT
Start: 2017-02-20 | End: 2017-02-20

## 2017-02-20 RX ORDER — LIDOCAINE HYDROCHLORIDE 10 MG/ML
1 INJECTION, SOLUTION EPIDURAL; INFILTRATION; INTRACAUDAL; PERINEURAL ONCE
Status: DISCONTINUED | OUTPATIENT
Start: 2017-02-20 | End: 2017-02-20

## 2017-02-20 RX ORDER — MORPHINE SULFATE 2 MG/ML
2 INJECTION, SOLUTION INTRAMUSCULAR; INTRAVENOUS
Status: DISCONTINUED | OUTPATIENT
Start: 2017-02-20 | End: 2017-02-22 | Stop reason: HOSPADM

## 2017-02-20 RX ORDER — DIPHENHYDRAMINE HYDROCHLORIDE 50 MG/ML
25 INJECTION INTRAMUSCULAR; INTRAVENOUS EVERY 4 HOURS PRN
Status: DISCONTINUED | OUTPATIENT
Start: 2017-02-20 | End: 2017-02-22 | Stop reason: HOSPADM

## 2017-02-20 RX ORDER — OXYCODONE AND ACETAMINOPHEN 5; 325 MG/1; MG/1
TABLET ORAL
Status: COMPLETED
Start: 2017-02-20 | End: 2017-02-20

## 2017-02-20 RX ADMIN — SODIUM CHLORIDE, SODIUM GLUCONATE, SODIUM ACETATE, POTASSIUM CHLORIDE, MAGNESIUM CHLORIDE, SODIUM PHOSPHATE, DIBASIC, AND POTASSIUM PHOSPHATE: .53; .5; .37; .037; .03; .012; .00082 INJECTION, SOLUTION INTRAVENOUS at 02:02

## 2017-02-20 RX ADMIN — ONDANSETRON 4 MG: 2 INJECTION INTRAMUSCULAR; INTRAVENOUS at 04:02

## 2017-02-20 RX ADMIN — ROCURONIUM BROMIDE 5 MG: 10 INJECTION, SOLUTION INTRAVENOUS at 01:02

## 2017-02-20 RX ADMIN — HYDROMORPHONE HYDROCHLORIDE 0.2 MG: 1 INJECTION, SOLUTION INTRAMUSCULAR; INTRAVENOUS; SUBCUTANEOUS at 06:02

## 2017-02-20 RX ADMIN — ESMOLOL HYDROCHLORIDE 10 MG: 10 INJECTION INTRAVENOUS at 03:02

## 2017-02-20 RX ADMIN — FENTANYL CITRATE 50 MCG: 50 INJECTION, SOLUTION INTRAMUSCULAR; INTRAVENOUS at 01:02

## 2017-02-20 RX ADMIN — GLYCOPYRROLATE 0.2 MG: 0.2 INJECTION, SOLUTION INTRAMUSCULAR; INTRAVENOUS at 02:02

## 2017-02-20 RX ADMIN — PROPOFOL 200 MG: 10 INJECTION, EMULSION INTRAVENOUS at 01:02

## 2017-02-20 RX ADMIN — SODIUM CHLORIDE: 0.9 INJECTION, SOLUTION INTRAVENOUS at 01:02

## 2017-02-20 RX ADMIN — FENTANYL CITRATE 100 MCG: 50 INJECTION, SOLUTION INTRAMUSCULAR; INTRAVENOUS at 01:02

## 2017-02-20 RX ADMIN — Medication 3 ML: at 10:02

## 2017-02-20 RX ADMIN — OXYCODONE HYDROCHLORIDE AND ACETAMINOPHEN 1 TABLET: 5; 325 TABLET ORAL at 09:02

## 2017-02-20 RX ADMIN — MIDAZOLAM HYDROCHLORIDE 2 MG: 1 INJECTION, SOLUTION INTRAMUSCULAR; INTRAVENOUS at 01:02

## 2017-02-20 RX ADMIN — ESMOLOL HYDROCHLORIDE 20 MG: 10 INJECTION INTRAVENOUS at 04:02

## 2017-02-20 RX ADMIN — Medication 2 G: at 01:02

## 2017-02-20 RX ADMIN — ESMOLOL HYDROCHLORIDE 20 MG: 10 INJECTION INTRAVENOUS at 03:02

## 2017-02-20 RX ADMIN — VANCOMYCIN HYDROCHLORIDE 1 G: 1 INJECTION, POWDER, LYOPHILIZED, FOR SOLUTION INTRAVENOUS at 01:02

## 2017-02-20 RX ADMIN — FENTANYL CITRATE 50 MCG: 50 INJECTION, SOLUTION INTRAMUSCULAR; INTRAVENOUS at 02:02

## 2017-02-20 RX ADMIN — FENTANYL CITRATE 50 MCG: 50 INJECTION, SOLUTION INTRAMUSCULAR; INTRAVENOUS at 03:02

## 2017-02-20 RX ADMIN — SUCCINYLCHOLINE CHLORIDE 120 MG: 20 INJECTION, SOLUTION INTRAMUSCULAR; INTRAVENOUS at 01:02

## 2017-02-20 RX ADMIN — OXYCODONE HYDROCHLORIDE AND ACETAMINOPHEN 1 TABLET: 5; 325 TABLET ORAL at 05:02

## 2017-02-20 RX ADMIN — DEXAMETHASONE SODIUM PHOSPHATE 8 MG: 4 INJECTION, SOLUTION INTRAMUSCULAR; INTRAVENOUS at 01:02

## 2017-02-20 RX ADMIN — LIDOCAINE HYDROCHLORIDE 75 MG: 20 INJECTION, SOLUTION INTRAVENOUS at 01:02

## 2017-02-20 RX ADMIN — PROMETHAZINE HYDROCHLORIDE 6.25 MG: 25 INJECTION INTRAMUSCULAR; INTRAVENOUS at 05:02

## 2017-02-20 NOTE — INTERVAL H&P NOTE
The patient has been examined and the H&P has been reviewed:    I concur with the findings and no changes have occurred since H&P was written.    Anesthesia/Surgery risks, benefits and alternative options discussed and understood by patient/family.            Active Hospital Problems    Diagnosis  POA    Nontoxic multinodular goiter [E04.2]  Yes      Resolved Hospital Problems    Diagnosis Date Resolved POA   No resolved problems to display.

## 2017-02-20 NOTE — H&P (VIEW-ONLY)
Subjective:       Patient ID: Kristina Graham is a 46 y.o. female.    Chief Complaint: Thyroid Problem    HPI     [facility]    CC: thyroid nodule, multinodular goiter    HISTORY OF PRESENT ILLNESS:      Kristina Graham is a 46 y.o. year-old female who has been referred by Dr. Herman for evaluation of a 1 year history of a central neck mass/goiter. She first noted a mass in the central neck in 2016. She did not have an US until 2 weeks ago. She has bilateral nodules evident on ultrasound, with a dominant nodule evident on the left (2.5cm).  She has not had an FNA. She thinks that it is not getting larger. Recently, she has had some compressive symptoms such as a globus sensation and mild dysphagia. She has trouble sleeping on her back and has to sleep propped up to be comfortable. She feels pressure on her throat as well. She thinks that her voice is occasionally hoarse. She denies odynophagia, throat pain, and otalgia. She is a non-smoker. She admits to cough and throat clearing. There is not hemoptysis or hematemesis. She does not  experience shortness of breath occasionally. There is family history of thyroid disease, but not thyroid cancer. There is no personal history of radiation exposure or treatment to the head and neck region.    Dr. Herman performed a thyroid ultrasound, revealing a large thyroid with a dominant left sided nodule measuring 3.2cm, heterogeneous vascularity, and microcalcifications.     Past Medical History   Diagnosis Date    Arthritis     History of vitamin D deficiency 2016    Rheumatoid arthritis(714.0)        Past Surgical History   Procedure Laterality Date    Abdominal surgery       section, low transverse           Current Outpatient Prescriptions:     etodolac (LODINE XL) 400 MG 24 hr tablet, Take 1 tablet (400 mg total) by mouth once daily., Disp: 90 tablet, Rfl: 1    spironolactone (ALDACTONE) 25 MG tablet, Take 25 mg by  mouth once daily.  , Disp: , Rfl:     Review of patient's allergies indicates:   Allergen Reactions    Bactrim [sulfamethoxazole-trimethoprim]        Social History     Social History    Marital status:      Spouse name: N/A    Number of children: N/A    Years of education: N/A     Occupational History    Not on file.     Social History Main Topics    Smoking status: Never Smoker    Smokeless tobacco: Not on file      Comment: drug rep    Alcohol use Yes      Comment: social    Drug use: No    Sexual activity: Yes     Partners: Male     Birth control/ protection: Surgical     Other Topics Concern    Not on file     Social History Narrative       Family History   Problem Relation Age of Onset    Cancer Mother     Diabetes Mother     Hypertension Mother     Cataracts Mother     Cancer Father     Hypertension Father     Heart disease Father     Heart disease Paternal Grandfather     Hypertension Paternal Grandfather     Hypertension Sister     Cancer Maternal Aunt     Cancer Paternal Uncle     Hypertension Paternal Uncle     Cancer Maternal Grandmother     Diabetes Maternal Grandmother     Hypertension Maternal Grandmother     Glaucoma Maternal Grandmother     Heart disease Paternal Grandmother     Hypertension Paternal Grandmother     Glaucoma Maternal Grandfather      Review of Systems   Constitutional: Negative for activity change, appetite change, chills, diaphoresis, fatigue, fever and unexpected weight change.   HENT: Positive for trouble swallowing and voice change. Negative for congestion, dental problem, drooling, ear discharge, ear pain, facial swelling, hearing loss, mouth sores, nosebleeds, postnasal drip, rhinorrhea, sinus pressure, sneezing, sore throat and tinnitus.    Eyes: Negative for pain, discharge, redness, itching and visual disturbance.   Respiratory: Positive for cough. Negative for choking and shortness of breath.    Cardiovascular: Negative for chest pain,  palpitations and leg swelling.   Gastrointestinal: Negative for abdominal distention, abdominal pain, constipation, diarrhea, nausea and vomiting.   Endocrine: Negative for cold intolerance and heat intolerance.   Genitourinary: Negative for difficulty urinating, dysuria and hematuria.   Musculoskeletal: Negative for arthralgias, back pain, gait problem, myalgias, neck pain and neck stiffness.   Skin: Negative for rash and wound.   Allergic/Immunologic: Negative for environmental allergies and immunocompromised state.   Neurological: Negative for dizziness, facial asymmetry, speech difficulty, weakness, light-headedness, numbness and headaches.   Hematological: Negative for adenopathy. Does not bruise/bleed easily.   Psychiatric/Behavioral: Negative for dysphoric mood. The patient is not nervous/anxious.        Objective:      Physical Exam   Constitutional: She is oriented to person, place, and time. She appears well-developed and well-nourished. She is cooperative. She does not appear ill. No distress.   HENT:   Head: Normocephalic and atraumatic.   Right Ear: Hearing, tympanic membrane, external ear and ear canal normal.   Left Ear: Hearing, tympanic membrane, external ear and ear canal normal.   Nose: Septal deviation (subtle twist) present. No mucosal edema, rhinorrhea or nasal deformity. No epistaxis.  No foreign bodies. Right sinus exhibits no maxillary sinus tenderness and no frontal sinus tenderness. Left sinus exhibits no maxillary sinus tenderness and no frontal sinus tenderness.   Mouth/Throat: Uvula is midline, oropharynx is clear and moist and mucous membranes are normal. Mucous membranes are not pale, not dry and not cyanotic. She does not have dentures. No oral lesions. No trismus in the jaw. Normal dentition. No uvula swelling or dental caries. No oropharyngeal exudate, posterior oropharyngeal edema, posterior oropharyngeal erythema or tonsillar abscesses.   Procedure: Flexible laryngoscopy  In  order to fully examine the upper aerodigestive tract, including the larynx, in a patient with a hyperactive gag reflex, flexible endoscopy is required.  After explaining the procedure and obtaining verbal consent, a timeout was performed with the patient's participation according to the universal protocol. Both nasal cavities were anesthetized with 4% Xylocaine spray mixed with Toan-Synephrine. The flexible laryngoscope (#[id number]) was inserted into the nasal cavity and advanced to visualize the nasal cavity, nasopharynx, the posterior oropharynx, hypopharynx, and the endolarynx with the above findings noted. The scope was removed and the procedure terminated. The patient tolerated this procedure well without apparent complication.      FINDINGS  Nasopharynx - the torus is clear. There are no lesions of the posterior wall.   Oropharynx - no lesions of the tongue base. There is no obvious fullness or asymmetry.  Hypopharynx - there are no lesions of the pyriform sinuses or postcricoid region    Larynx - there are no lesions of the supraglottic or glottic larynx. Vocal fold mobility is normal with complete closure.      Eyes: Conjunctivae are normal. Right eye exhibits no discharge. Left eye exhibits no discharge. No scleral icterus.   Neck: Trachea normal, normal range of motion and phonation normal. Neck supple. No JVD present. No tracheal tenderness present. No tracheal deviation present. Thyromegaly present. No thyroid mass present.   Salivary glands - there are no lesions or asymmetric findings in the submandibular or parotid glands     Cardiovascular: Normal rate.    Pulmonary/Chest: Effort normal. No stridor. No respiratory distress.   Lymphadenopathy:        Head (right side): No submental, no submandibular, no tonsillar and no preauricular adenopathy present.        Head (left side): No submental, no submandibular, no tonsillar and no preauricular adenopathy present.     She has no cervical adenopathy.    Neurological: She is alert and oriented to person, place, and time. No cranial nerve deficit.   Skin: Skin is warm and dry. No rash noted. She is not diaphoretic. No erythema. No pallor.   Psychiatric: She has a normal mood and affect. Her behavior is normal. Thought content normal.   Vitals reviewed.      Thyroid US and Endocrinology Notes reviewed in EPIC    Assessment and Plan:       Problem List Items Addressed This Visit     Nontoxic multinodular goiter     Based on her outside ultrasound, Ms. Graham has a dominant left sided nodule in the setting of a large, symptomatic multinodular goiter. Her options include observation, US-FNA, and surgery. We discussed the relative merits of biopsy in how it could shape a recommendation for surgery or the extent of surgery, but her large, symptomatic gland and the nodule size render FNA slightly less accurate and anything less than a total thyroidectomy fraught with potential for not improving her symptoms.    She flatly stated a desire to have a total thyroidectomy, and this is exactly what she had been counseled by Dr. Herman. I explained the risks of thyroidectomy include, but are not limited to, infection, bleeding, scarring, failure to achieve the diagnosis, no evidence of cancer, recurrence, collection of blood or tissue fluid requiring drainage, injury to the recurrent laryngeal nerve with resultant temporary or permanent hoarseness (1% permanent risk with up to 10% temporary risk, greater in revision operations), injury to the superior laryngeal nerve with resultant loss of the upper register for singing or challenges with yelling, temporary or permanent hypocalcemia related to injury or devascularization of the parathyroid glands (less than 5% permanent, up to 30-60% when paratracheal dissection is accomplished, again greater in revision operations), and the need for additional procedures or therapies.  Time was allowed for questions, and all questions were  answered to the patient's apparent satisfaction. The risks of paratracheal lymph node dissection are included above. Informed consent was obtained.             Relevant Orders    Basic metabolic panel    CBC auto differential    TSH    PTH, intact    T4, free    Case Request Operating Room: THYROIDECTOMY (Completed)

## 2017-02-20 NOTE — OR NURSING
Specimen(s) Sent to Pathology:   1)  Right Paratracheal Lymph Node - FROZEN, in specimen cup, no preservatives, sent to frozen pathology.   2) Tissue at Left Berry Ligament - PERM, in formalin, placed in specimen refrigerator.   3) Pyramidal Lobe - PERM, in formalin, placed in specimen refrigerator.  4) Total Thyroidectomy. Stitch marks Right Superior Pole -  PERM, in formalin, placed in specimen refrigerator.

## 2017-02-20 NOTE — BRIEF OP NOTE
[facility]  Brief Operative Note    SUMMARY     Surgery Date: 2/20/2017     Surgeon(s) and Role:     * Kris Mondragon MD - Primary     * Bailey Mcmanus MD - Resident - Assisting    Pre-op Diagnosis:  Nontoxic multinodular goiter [E04.2]    Post-op Diagnosis:  Post-Op Diagnosis Codes:     * Nontoxic multinodular goiter [E04.2]    Procedure(s) (LRB):  THYROIDECTOMY (Bilateral)    Anesthesia: General    Description of Procedure: Please see formal dictation    Estimated Blood Loss: 20cc         Specimens:   Specimen (12h ago through future)    Start     Ordered    02/20/17 1630  Specimen to Pathology - Surgery  Once     Comments:  Specimen(s) Sent to Pathology:   1)  Right Paratracheal Lymph Node - FROZEN, in specimen cup, no preservatives, sent to frozen pathology.   2) Tissue at Left Berry Ligament - PERM, in formalin, placed in specimen refrigerator.   3) Pyramidal Lobe - PERM, in formalin, placed in specimen refrigerator.  4) Total Thyroidectomy. Stitch marks Right Superior Pole -  PERM, in formalin, placed in specimen refrigerator.    02/20/17 0118

## 2017-02-20 NOTE — IP AVS SNAPSHOT
[facility]  [address]  [facility] [address]  Phone: [phone]           Patient Discharge Instructions     Our goal is to set you up for success. This packet includes information on your condition, medications, and your home care. It will help you to care for yourself so you don't get sicker and need to go back to the hospital.     Please ask your nurse if you have any questions.        There are many details to remember when preparing to leave the hospital. Here is what you will need to do:    1. Take your medicine. If you are prescribed medications, review your Medication List in the following pages. You may have new medications to  at the pharmacy and others that you'll need to stop taking. Review the instructions for how and when to take your medications. Talk with your doctor or nurses if you are unsure of what to do.     2. Go to your follow-up appointments. Specific follow-up information is listed in the following pages. Your may be contacted by a transition nurse or clinical provider about future appointments. Be sure we have all of the phone numbers to reach you, if needed. Please contact your provider's office if you are unable to make an appointment.     3. Watch for warning signs. Your doctor or nurse will give you detailed warning signs to watch for and when to call for assistance. These instructions may also include educational information about your condition. If you experience any of warning signs to your health, call your doctor.               [facility]  Unless otherwise directed by your provider, please contact [facility], our nurse care line that is available for 24/7 assistance.     [phone] (toll-free)    Registered nurses in the [facility] provide clinical advisement, health education, appointment booking, and other advisory services.                    ** Verify the list of medication(s) below is accurate and up  to date. Carry this with you in case of emergency. If your medications have changed, please notify your healthcare provider.             Medication List      START taking these medications        Additional Info                      calcitRIOL 0.25 MCG Cap   Commonly known as:  ROCALTROL   Quantity:  60 capsule   Refills:  0   Dose:  0.25 mcg    Last time this was given:  0.25 mcg on 2/22/2017  8:16 AM   Instructions:  Take 1 capsule (0.25 mcg total) by mouth once daily.     Begin Date    AM    Noon    PM    Bedtime       calcium carbonate 500 mg calcium (1,250 mg) tablet   Commonly known as:  OS-STEVE   Quantity:  60 tablet   Refills:  0   Dose:  1000 mg    Last time this was given:  1,000 mg on 2/22/2017  6:12 AM   Instructions:  Take 2 tablets (1,000 mg total) by mouth 3 (three) times daily.     Begin Date    AM    Noon    PM    Bedtime       levothyroxine 150 MCG tablet   Commonly known as:  SYNTHROID   Quantity:  90 tablet   Refills:  1   Dose:  150 mcg    Last time this was given:  150 mcg on 2/22/2017  6:12 AM   Instructions:  Take 1 tablet (150 mcg total) by mouth before breakfast.     Begin Date    AM    Noon    PM    Bedtime       ondansetron 8 MG Tbdl   Commonly known as:  ZOFRAN-ODT   Quantity:  25 tablet   Refills:  0   Dose:  8 mg    Instructions:  Take 1 tablet (8 mg total) by mouth every 8 (eight) hours as needed.     Begin Date    AM    Noon    PM    Bedtime       oxycodone-acetaminophen 5-325 mg per tablet   Commonly known as:  PERCOCET   Quantity:  30 tablet   Refills:  0   Dose:  1 tablet    Last time this was given:  1 tablet on 2/22/2017  6:12 AM   Instructions:  Take 1 tablet by mouth every 4 (four) hours as needed.     Begin Date    AM    Noon    PM    Bedtime         CONTINUE taking these medications        Additional Info                      etodolac 400 MG 24 hr tablet   Commonly known as:  LODINE XL   Quantity:  90 tablet   Refills:  1   Dose:  400 mg    Instructions:  Take 1 tablet (400  mg total) by mouth once daily.     Begin Date    AM    Noon    PM    Bedtime       spironolactone 25 MG tablet   Commonly known as:  ALDACTONE   Refills:  0   Dose:  100 mg    Last time this was given:  100 mg on 2/22/2017  8:16 AM   Instructions:  Take 100 mg by mouth once daily.     Begin Date    AM    Noon    PM    Bedtime            Where to Get Your Medications      You can get these medications from any pharmacy     Bring a paper prescription for each of these medications     calcitRIOL 0.25 MCG Cap    calcium carbonate 500 mg calcium (1,250 mg) tablet    levothyroxine 150 MCG tablet    ondansetron 8 MG Tbdl    oxycodone-acetaminophen 5-325 mg per tablet                  Please bring to all follow up appointments:    1. A copy of your discharge instructions.  2. All medicines you are currently taking in their original bottles.  3. Identification and insurance card.    Please arrive 15 minutes ahead of scheduled appointment time.    Please call 24 hours in advance if you must reschedule your appointment and/or time.        Follow-up Information     Follow up with Kathleen Alvarado NP In 1 week.    Specialty:  Otolaryngology    Contact information:    [address] [facility] [address]  [phone]          Discharge Instructions     Future Orders    Call MD for:  persistent nausea and vomiting or diarrhea     Call MD for:  redness, tenderness, or signs of infection (pain, swelling, redness, odor or green/yellow discharge around incision site)     Call MD for:  severe uncontrolled pain     Call MD for:  temperature >100.4     Diet general     Questions:    Total calories:      Fat restriction, if any:      Protein restriction, if any:      Na restriction, if any:      Fluid restriction:      Additional restrictions:      No dressing needed     Comments:    Do not scrub dermabond or incision line. Will examine in first clinic appointment.    Weight bearing restrictions (specify)     Comments:    Light  "activity only. No heavy lifting, straining, stooping, exercising.        Primary Diagnosis     Your primary diagnosis was:  Nontoxic Multinodular Goiter      Admission Information     Date & Time Provider Department CSN    2/20/2017  8:07 AM Kris Mondragon MD [facility] [id number]      Care Providers     Provider Role Specialty Primary office phone    Kris Mondragon MD Attending Provider Otolaryngology [phone]    Kris Mondragon MD Surgeon  Otolaryngology [phone]      Your Vitals Were     BP Pulse Temp Resp Height Weight    105/60 62 98 °F (36.7 °C) 16 5' 9" (1.753 m) 87.5 kg (192 lb 14.4 oz)    SpO2 BMI             98% 28.49 kg/m2         Recent Lab Values        6/18/2013                           9:19 AM           A1C 5.0                       Pending Labs     Order Current Status    Specimen to Pathology - Surgery In process      Allergies as of 2/22/2017        Reactions    Bactrim [Sulfamethoxazole-trimethoprim]       Advance Directives     An advance directive is a document which, in the event you are no longer able to make decisions for yourself, tells your healthcare team what kind of treatment you do or do not want to receive, or who you would like to make those decisions for you.  If you do not currently have an advance directive, [facility] encourages you to create one.  For more information call:  [phone]), [phone] ([phone]),  or log on to [email or web address].        Language Assistance Services     ATTENTION: Language assistance services are available, free of charge. Please call [phone].      ATENCIÓN: Si habla español, tiene a peñaloza disposición servicios gratuitos de asistencia lingüística. Llame al [phone].     CHÚ Ý: N?u b?n nói Ti?ng Vi?t, có các d?ch v? h? tr? ngôn ng? mi?n phí dành cho b?n. G?i s? [phone].         [facility] complies with applicable Federal civil rights laws and does not " discriminate on the basis of race, color, national origin, age, disability, or sex.

## 2017-02-20 NOTE — TRANSFER OF CARE
"Anesthesia Transfer of Care Note    Patient: Kristina Graham    Procedure(s) Performed: Procedure(s) (LRB):  THYROIDECTOMY (Bilateral)    Patient location: PACU    Anesthesia Type: general    Transport from OR: Transported from OR on 6-10 L/min O2 by face mask with adequate spontaneous ventilation    Post pain: adequate analgesia    Post assessment: no apparent anesthetic complications and tolerated procedure well    Post vital signs: stable    Level of consciousness: sedated    Nausea/Vomiting: no nausea/vomiting    Complications: none          Last vitals:   Visit Vitals    /70 (BP Location: Right arm, Patient Position: Lying, BP Method: Automatic)    Pulse 107    Temp 36.7 °C (98 °F) (Axillary)    Resp 12    Ht 5' 9" (1.753 m)    Wt 87.5 kg (192 lb 14.4 oz)    SpO2 100%    Breastfeeding No    BMI 28.49 kg/m2     "

## 2017-02-21 LAB
CA-I BLDV-SCNC: 0.9 MMOL/L
CA-I BLDV-SCNC: 0.96 MMOL/L
PTH-INTACT SERPL-MCNC: <5 PG/ML

## 2017-02-21 PROCEDURE — 25000003 PHARM REV CODE 250: Performed by: OTOLARYNGOLOGY

## 2017-02-21 PROCEDURE — 63600175 PHARM REV CODE 636 W HCPCS: Performed by: OTOLARYNGOLOGY

## 2017-02-21 PROCEDURE — 83970 ASSAY OF PARATHORMONE: CPT

## 2017-02-21 PROCEDURE — 82330 ASSAY OF CALCIUM: CPT | Mod: 91

## 2017-02-21 PROCEDURE — 82330 ASSAY OF CALCIUM: CPT

## 2017-02-21 PROCEDURE — 25000003 PHARM REV CODE 250: Performed by: STUDENT IN AN ORGANIZED HEALTH CARE EDUCATION/TRAINING PROGRAM

## 2017-02-21 PROCEDURE — 36415 COLL VENOUS BLD VENIPUNCTURE: CPT

## 2017-02-21 RX ORDER — CALCITRIOL 0.25 UG/1
0.25 CAPSULE ORAL DAILY
Status: DISCONTINUED | OUTPATIENT
Start: 2017-02-21 | End: 2017-02-22 | Stop reason: HOSPADM

## 2017-02-21 RX ORDER — CALCIUM CARBONATE 500(1250)
1000 TABLET ORAL 3 TIMES DAILY
Status: DISCONTINUED | OUTPATIENT
Start: 2017-02-21 | End: 2017-02-21

## 2017-02-21 RX ORDER — SPIRONOLACTONE 50 MG/1
100 TABLET, FILM COATED ORAL DAILY
Status: DISCONTINUED | OUTPATIENT
Start: 2017-02-22 | End: 2017-02-21

## 2017-02-21 RX ORDER — SPIRONOLACTONE 50 MG/1
100 TABLET, FILM COATED ORAL DAILY
Status: DISCONTINUED | OUTPATIENT
Start: 2017-02-21 | End: 2017-02-22 | Stop reason: HOSPADM

## 2017-02-21 RX ORDER — CALCIUM CARBONATE 500(1250)
1000 TABLET ORAL 3 TIMES DAILY
Status: DISCONTINUED | OUTPATIENT
Start: 2017-02-21 | End: 2017-02-22 | Stop reason: HOSPADM

## 2017-02-21 RX ADMIN — Medication 3 ML: at 10:02

## 2017-02-21 RX ADMIN — SPIRONOLACTONE 100 MG: 50 TABLET, FILM COATED ORAL at 10:02

## 2017-02-21 RX ADMIN — LEVOTHYROXINE SODIUM 150 MCG: 75 TABLET ORAL at 05:02

## 2017-02-21 RX ADMIN — OXYCODONE HYDROCHLORIDE AND ACETAMINOPHEN 1 TABLET: 5; 325 TABLET ORAL at 05:02

## 2017-02-21 RX ADMIN — OXYCODONE HYDROCHLORIDE AND ACETAMINOPHEN 1 TABLET: 5; 325 TABLET ORAL at 03:02

## 2017-02-21 RX ADMIN — ENOXAPARIN SODIUM 40 MG: 100 INJECTION SUBCUTANEOUS at 12:02

## 2017-02-21 RX ADMIN — OXYCODONE HYDROCHLORIDE AND ACETAMINOPHEN 1 TABLET: 5; 325 TABLET ORAL at 09:02

## 2017-02-21 RX ADMIN — CALCIUM 1000 MG: 500 TABLET ORAL at 10:02

## 2017-02-21 RX ADMIN — OXYCODONE HYDROCHLORIDE AND ACETAMINOPHEN 1 TABLET: 5; 325 TABLET ORAL at 07:02

## 2017-02-21 RX ADMIN — CALCITRIOL 0.25 MCG: 0.25 CAPSULE, LIQUID FILLED ORAL at 09:02

## 2017-02-21 RX ADMIN — CALCIUM 1000 MG: 500 TABLET ORAL at 06:02

## 2017-02-21 RX ADMIN — OXYCODONE HYDROCHLORIDE AND ACETAMINOPHEN 1 TABLET: 5; 325 TABLET ORAL at 01:02

## 2017-02-21 NOTE — ANESTHESIA RELEASE NOTE
"Anesthesia Release from PACU Note    Patient: Kristina Graham    Procedure(s) Performed: Procedure(s) (LRB):  THYROIDECTOMY (Bilateral)    Anesthesia type: general    Post pain: Adequate analgesia    Post assessment: no apparent anesthetic complications, tolerated procedure well and no evidence of recall    Last Vitals:   Visit Vitals    /70    Pulse 99    Temp 36.7 °C (98 °F) (Axillary)    Resp 20    Ht 5' 9" (1.753 m)    Wt 87.5 kg (192 lb 14.4 oz)    SpO2 95%    Breastfeeding No    BMI 28.49 kg/m2       Post vital signs: stable    Level of consciousness: awake and alert     Nausea/Vomiting: nausea    Complications: none    Airway Patency: patent    Respiratory: unassisted, spontaneous ventilation, room air    Cardiovascular: stable and blood pressure at baseline    Hydration: euvolemic  "

## 2017-02-21 NOTE — OP NOTE
DATE OF PROCEDURE: 02/20/17    PROCEDURES PERFORMED: Total thyroidectomy with intraoperative recurrent laryngeal nerve monitoring    PREOPERATIVE DIAGNOSIS: Multinodular goiter    POSTOPERATIVE DIAGNOSIS: Same    SURGEON: Kris Mondragon MD    ASSISTANT: Bailey Mcmanus M.D. (RES)    INDICATIONS FOR PROCEDURE: Kristina Graham is a 46 year-old female who was referred by to the head and neck cancer center by Dr. Herman for evaluation of a 1 year history of a central neck mass/goiter. She first noted a mass in the central neck in 2016. She did not have an US until 2 weeks ago. She has bilateral nodules evident on ultrasound, with a dominant nodule evident on the left (2.5cm). She has not had an FNA. She thinks that it is not getting larger. Recently, she had some compressive symptoms such as a globus sensation and mild dysphagia. She thinks that her voice is occasionally hoarse. She denies odynophagia, throat pain, and otalgia. She is a non-smoker. She admits to cough and throat clearing. There is not hemoptysis or hematemesis. She does not experience shortness of breath occasionally. There is family history of thyroid disease, but not thyroid cancer. There is no personal history of radiation exposure or treatment to the head and neck region. Dr. Herman performed a thyroid ultrasound, revealing a large thyroid with a dominant left sided nodule measuring 3.2cm, heterogeneous vascularity, and microcalcifications. Her options were discussed and she elected to proceed with total thyroidectomy.    INTRAOPERATIVE FINDINGS: The thyroid gland was diffusely enlarged. There was a hemorrhagic nodule on the left lobe and a deeper firm nodule approx 3cm in size. Superior and inferior parathyroids were identified and preserved bilaterally. The RLNs were identified bilaterally, confirmed with a nerve probe, and noted excellent stimulation bilaterally at the close of the case, with response thresholds over 500 microvolts on the right  and 400 on the left at a stimulus intensity of 0.8 mA.    PROCEDURE IN DETAIL: The patient was identified in preoperative holding using two patient specific identifiers. The procedure was discussed in detail, including all risks, benefits, and alternatives. All questions were answered to the patient's apparent satisfaction. Verbal and written consent were obtained. The patient was transported to the OR on a stretcher. General endotracheal anesthesia was induced per the anesthesia team without difficulty using a Nims recurrent laryngeal nerve monitoring tube. The incision was marked in a horizontal neck crease over the thyroid gland. It was injected with lidocaine 1% with epinephrine 1:100,000 4cc. The patient was prepped and draped in the normal sterile fashion and a time out was performed according to protocol.     A #15 scalpel was used to incise the skin and subcutaneous fat. Bovie electrocautery was then used to incise the platysma and for hemostasis. Subplatysmal flaps were raised with the monopolar cautery superiorly to the level of the thyroid notch and inferiorly to the clavicles. The strap muscles were the  at the midline raphe and retracted laterally.     The right lobe of the gland was approached first. The sternothyroid muscles was bluntly dissected off the gland, leaving the capsule intact. The gland was grossly enlarged, somewhat irregular in shape, and friable, with a botryoid appearance and nature of the nodules. The superior pole was identified and the pedicle ligated at the capsule with clips and silk ties after developing the cricothyroid space, identifying and preserving the SLN, and doubly clamping the superior pedicle. The dissection was then carried inferiorly and the middle thyroid vein was identified and ligated with 2-0 silk ties. Dissection was then carried out in the tracheoesophageal groove with a fine tonsil. The superior and inferior parathyroids were identified and preserved.  The right recurrent laryngeal nerve was identified and confirmed with the nerve monitoring probe. It was traced superiorly to its entrance into the cricothyroid membrane. It was traced inferiorly as it dove away from Duarte's ligament. Duarte's ligament was noted to be infiltrated with thyroid tissue. The inferior pole of the lobe was then dissected and its pedicle ligated at the capsule with clips and silk ties. Duarte's ligament was then cauterized with bipolar cautery off the trachea and cut with a Hakeem scissor, freeing the right lobe of the gland. Thyroid nodular tissue infiltrated to the insertion of the ligament, deep to the RLN, and a tiny cuff (1x1mm) was preserved because the risks of excision in terms of damage to the RLN exceeded the benefits, as this tiny remnant could be ablated in the setting of malignancy (and the gland does not have the overt appearance of same, plus multiple lymph nodes that were sampled from the paratracheal region were benign on frozen section). This same configuration and response exists on the left as well.    Attention was then turned to the left lobe. Again, the lobe was noted to be enlarged. There was a hemorrhage nodule approx 2cm in size on the anterior surface of the left gland. There was a firm 3cm nodule in the parenchyma of the left lobe. The procedure was repeated as above for the left thyroid lobe. The superior and inferior parathyroid glands were identified and left in situ. The left recurrent laryngeal nerve was identified, preserved, and demonstrated good stimulation with the nerve probe. Once the left lobe was freed as stated above, the entire thyroid gland was removed en bloc.     A thorough exam of the thyroid bed was performed. A pyramidal lobe was traced superiorly to the thyrohyoid membrane, was clamped superior to its origin at the hyoid, tied off with a 2-0 silk, and removed. A fragment of thyroid tissue in Duarte's ligament on the left just medial to the left  RLN was removed and sent for permanent pathology. There were several paratracheal lymph nodes noted bilaterally. They were not grossly pathologic, with a more plump and reactive consistency. One on the left was sent for permanent pathology after several were sent for frozen section. Hemostasis was then achieved using modest bipolar cautery and confirmed with valsalva maneuver. Surgicel was placed into the lateral neck gutters. The wound was then closed in layers, with 4-0 vicryl interrupted sutures to reapproximate the strap muscles and the platysma, 4-0 monocryl interrupted sutures in the dermis, and dermabond on the skin. At this time the operation was deemed complete without complication.  General anesthesia was reversed and the patient was extubated without difficulty. She was transferred to the PACU for recovery in good condition.    ANESTHESIA: General endotracheal.     ESTIMATED BLOOD LOSS: 20 mL.     SPECIMENS:   Specimen to Pathology - Surgery Once    Comments: Specimen(s) Sent to Pathology:   1) Right Paratracheal Lymph Node - FROZEN, in specimen cup, no preservatives, sent to frozen pathology.   2) Tissue at Left Berry Ligament - PERM, in formalin, placed in specimen refrigerator.   3) Pyramidal Lobe - PERM, in formalin, placed in specimen refrigerator.  4) Total Thyroidectomy. Stitch marks Right Superior Pole - PERM, in formalin, placed in specimen refrigerator.        COMPLICATIONS: None     DRAINS: None    IVF: Per anesthesia     Dr. Mondragon was present and conducted the entire case.

## 2017-02-21 NOTE — PROGRESS NOTES
Spoke with Dr. Angel Bowens r/t pt c/o worsening tingling and numbness to fingers and mouth with experience of a flushed warm feeling to face with associated new generalized weakness. MD to come eval pt. MD voiced to administer dose of schedule calcium carbonate now at this time. Will administer and continue to closely monitor pt for progression of symptoms.

## 2017-02-21 NOTE — NURSING TRANSFER
Nursing Transfer Note      2/20/2017     Transfer To: 529A    Transfer via stretcher    Transfer with None    Transported by PCT    Medicines sent: None    Chart send with patient: Yes    Notified: spouse    Patient reassessed at: 2/20/17 1900    Upon arrival to floor: patient oriented to room, call bell in reach and bed in lowest position

## 2017-02-21 NOTE — ANESTHESIA POSTPROCEDURE EVALUATION
"Anesthesia Post Evaluation    Patient: Kristina Graham    Procedure(s) Performed: Procedure(s) (LRB):  THYROIDECTOMY (Bilateral)    Final Anesthesia Type: general  Patient location during evaluation: PACU  Patient participation: Yes- Able to Participate  Level of consciousness: awake and alert  Post-procedure vital signs: reviewed and stable  Pain management: adequate  Airway patency: patent  PONV status at discharge: nausea (controlled)  Anesthetic complications: no      Cardiovascular status: blood pressure returned to baseline and hemodynamically stable  Respiratory status: unassisted, spontaneous ventilation and room air  Hydration status: euvolemic  Follow-up not needed.        Visit Vitals    /70    Pulse 99    Temp 36.7 °C (98 °F) (Axillary)    Resp 20    Ht 5' 9" (1.753 m)    Wt 87.5 kg (192 lb 14.4 oz)    SpO2 95%    Breastfeeding No    BMI 28.49 kg/m2       Pain/Reyes Score: Pain Assessment Performed: Yes (2/20/2017  5:11 PM)  Presence of Pain: denies (2/20/2017  6:11 PM)  Pain Rating Prior to Med Admin: 0 (2/20/2017  5:45 PM)  Reyes Score: 9 (2/20/2017  6:11 PM)      "

## 2017-02-21 NOTE — PLAN OF CARE
Problem: Patient Care Overview  Goal: Plan of Care Review  Outcome: Ongoing (interventions implemented as appropriate)  Pt following plan of care well. Will continue current regimen and to close monitoring.     Problem: Fall Risk (Adult)  Goal: Identify Related Risk Factors and Signs and Symptoms  Related risk factors and signs and symptoms are identified upon initiation of Human Response Clinical Practice Guideline (CPG)   Outcome: Ongoing (interventions implemented as appropriate)  Pt remained free from falls this shift. Pt ambulatory independent to BR, ambulation encouraged though r/t generalized weakness and sensory deficits this shift, bedrest with bathroom privileges recommended at this time. Bed low position, non skid socks bilaterally, side rails X2, and call bell within reach and audible.     Problem: Fluid Volume Deficit (Adult)  Goal: Fluid/Electrolyte Balance  Patient will demonstrate the desired outcomes by discharge/transition of care.  Outcome: Ongoing (interventions implemented as appropriate)  Pt with hypocalcemia post thyroidectomy. Closely monitoring calcium labs this shift. Calcium supplementation provided per MD orders. Pt experiencing mild symptoms of hypocalcemia (generalized weakness, numbness/tingling to fingertips, and facial flushing) MD notified, and trending. Will continue to closely monitor for signs of progression.     Problem: Pain, Acute (Adult)  Goal: Identify Related Risk Factors and Signs and Symptoms  Related risk factors and signs and symptoms are identified upon initiation of Human Response Clinical Practice Guideline (CPG)  Outcome: Ongoing (interventions implemented as appropriate)  Pain medication being administered per pt request. Pain being appropriately managed at this time. Will continue to trend.     Problem: Wound, Traumatic, Nonburn (Adult)  Goal: Signs and Symptoms of Listed Potential Problems Will be Absent, Minimized or Managed (Wound, Traumatic, Nonburn)  Signs  and symptoms of listed potential problems will be absent, minimized or managed by discharge/transition of care (reference Wound, Traumatic, Nonburn (Adult) CPG).  Incision to neck post thyroidectomy open to air. Ointment in place. Mild swelling. Pt educated on splinting site if needing to cough to prevent edging from . Pain management in place.

## 2017-02-21 NOTE — PROGRESS NOTES
Otolaryngology - Head and Neck Surgery  Progress Note    Subjective: No issues overnight. Pain is mild. Tolerating clear liquids. No perioral numbness/tingling, no cramping in hands or feet.    Objective:  Temp:  [97.4 °F (36.3 °C)-99 °F (37.2 °C)]   Pulse:  []   Resp:  [12-22]   BP: (110-139)/(60-81)   SpO2:  [95 %-100 %]     NAD, A/Ox3. No increased WOB. Normal voice.  CN II-XII intact  MMM, full tongue ROM  Neck soft, supple. Incision C/D/I w/ dermabond. Some surrounding skin erythema from tape. No fluctuance or fluid collection.    ICal: 1.05 post-op  PTH: <5 this AM    Assessment: 46F w/ MNG POD#1 s/p total thyroidectomy. Parathyroids left in situ.    Plan:   - Calcium supplementation started  - Advance diet  - Recheck ionized calcium this AM  - Weight-based synthroid  - Pain and nausea regimen  - Likely D/C home today     D/w staff Dr. Mondragon

## 2017-02-22 VITALS
DIASTOLIC BLOOD PRESSURE: 60 MMHG | OXYGEN SATURATION: 98 % | WEIGHT: 192.88 LBS | SYSTOLIC BLOOD PRESSURE: 105 MMHG | HEIGHT: 69 IN | TEMPERATURE: 98 F | RESPIRATION RATE: 16 BRPM | HEART RATE: 62 BPM | BODY MASS INDEX: 28.57 KG/M2

## 2017-02-22 LAB — CA-I BLDV-SCNC: 0.97 MMOL/L

## 2017-02-22 PROCEDURE — 25000003 PHARM REV CODE 250: Performed by: OTOLARYNGOLOGY

## 2017-02-22 PROCEDURE — 25000003 PHARM REV CODE 250: Performed by: STUDENT IN AN ORGANIZED HEALTH CARE EDUCATION/TRAINING PROGRAM

## 2017-02-22 PROCEDURE — 82330 ASSAY OF CALCIUM: CPT

## 2017-02-22 PROCEDURE — 36415 COLL VENOUS BLD VENIPUNCTURE: CPT

## 2017-02-22 RX ORDER — CALCIUM CARBONATE 500(1250)
1000 TABLET ORAL 3 TIMES DAILY
Qty: 60 TABLET | Refills: 0 | Status: SHIPPED | OUTPATIENT
Start: 2017-02-22 | End: 2021-03-30

## 2017-02-22 RX ORDER — OXYCODONE AND ACETAMINOPHEN 5; 325 MG/1; MG/1
1 TABLET ORAL EVERY 4 HOURS PRN
Qty: 30 TABLET | Refills: 0 | Status: SHIPPED | OUTPATIENT
Start: 2017-02-22 | End: 2017-07-24

## 2017-02-22 RX ORDER — ONDANSETRON 8 MG/1
8 TABLET, ORALLY DISINTEGRATING ORAL EVERY 8 HOURS PRN
Qty: 25 TABLET | Refills: 0 | Status: SHIPPED | OUTPATIENT
Start: 2017-02-22 | End: 2017-07-24

## 2017-02-22 RX ORDER — LEVOTHYROXINE SODIUM 150 UG/1
150 TABLET ORAL
Qty: 90 TABLET | Refills: 1 | Status: SHIPPED | OUTPATIENT
Start: 2017-02-22 | End: 2017-02-24 | Stop reason: DRUGHIGH

## 2017-02-22 RX ORDER — CALCITRIOL 0.25 UG/1
0.25 CAPSULE ORAL DAILY
Qty: 60 CAPSULE | Refills: 0 | Status: SHIPPED | OUTPATIENT
Start: 2017-02-22 | End: 2017-03-17 | Stop reason: SDUPTHER

## 2017-02-22 RX ADMIN — CALCIUM 1000 MG: 500 TABLET ORAL at 06:02

## 2017-02-22 RX ADMIN — OXYCODONE HYDROCHLORIDE AND ACETAMINOPHEN 1 TABLET: 5; 325 TABLET ORAL at 06:02

## 2017-02-22 RX ADMIN — LEVOTHYROXINE SODIUM 150 MCG: 75 TABLET ORAL at 06:02

## 2017-02-22 RX ADMIN — CALCITRIOL 0.25 MCG: 0.25 CAPSULE, LIQUID FILLED ORAL at 08:02

## 2017-02-22 RX ADMIN — OXYCODONE HYDROCHLORIDE AND ACETAMINOPHEN 1 TABLET: 5; 325 TABLET ORAL at 12:02

## 2017-02-22 RX ADMIN — SPIRONOLACTONE 100 MG: 50 TABLET, FILM COATED ORAL at 08:02

## 2017-02-22 NOTE — PROGRESS NOTES
DC instructions given and reviewed with pt and pt's .  Pt and  verbalized understanding. Pt's IV removed.  Pt received prescriptions from MD.  Pt's  at bedside will transfer her home.  Pt requested to ambulate off unit.

## 2017-02-22 NOTE — PROGRESS NOTES
Otolaryngology - Head and Neck Surgery  Progress Note    Subjective: Patient with some yaya-oral tingling and numbness to fingers yesterday AM, which improved with increased Ca regiment. Pain is well controlled. Tolerating PO. Minimal perioral numbness/tingling, no cramping in hands or feet.    Objective:  Temp:  [95.4 °F (35.2 °C)-99.1 °F (37.3 °C)]   Pulse:  [66-87]   Resp:  [18]   BP: (107-115)/(59-73)   SpO2:  [95 %-97 %]     NAD, A/Ox3. No increased WOB. Normal voice.  CN II-XII intact  MMM, full tongue ROM  Neck soft, supple. Incision C/D/I w/ dermabond. Minimal surrounding skin erythema from tape. No fluctuance or fluid collection.  (-) Chvostek's sign    ICal: 0.97   PTH: <5 yesterday AM    Assessment: 46F w/ MNG POD#2 s/p total thyroidectomy. Parathyroids left in situ.    Plan:   - Cont calcium supplementation   - Cont regular diet  - Weight-based synthroid  - Pain and nausea regimen  - Anticipate D/C home today with current regiment    D/w staff Dr. Mondragon

## 2017-02-22 NOTE — PLAN OF CARE
Problem: Patient Care Overview  Goal: Plan of Care Review  Outcome: Ongoing (interventions implemented as appropriate)  Patient is awake, alert and oriented. Patient complained of some numbness in the left foot. Patient is ambulatory and independent. Remained free from injury and falls this shift. Bed is in the low and locked position with side rail up x 2. Call light is within reach. Informed to call if need assistance. Will continue to monitor.

## 2017-02-24 ENCOUNTER — OFFICE VISIT (OUTPATIENT)
Dept: OTOLARYNGOLOGY | Facility: CLINIC | Age: 47
End: 2017-02-24
Payer: COMMERCIAL

## 2017-02-24 ENCOUNTER — PATIENT MESSAGE (OUTPATIENT)
Dept: OTOLARYNGOLOGY | Facility: CLINIC | Age: 47
End: 2017-02-24

## 2017-02-24 ENCOUNTER — LAB VISIT (OUTPATIENT)
Dept: LAB | Facility: HOSPITAL | Age: 47
End: 2017-02-24
Payer: COMMERCIAL

## 2017-02-24 VITALS
SYSTOLIC BLOOD PRESSURE: 124 MMHG | DIASTOLIC BLOOD PRESSURE: 87 MMHG | TEMPERATURE: 99 F | HEART RATE: 72 BPM | WEIGHT: 191.38 LBS | BODY MASS INDEX: 28.26 KG/M2

## 2017-02-24 DIAGNOSIS — E83.51 HYPOCALCEMIA: ICD-10-CM

## 2017-02-24 DIAGNOSIS — E04.2 NONTOXIC MULTINODULAR GOITER: Primary | ICD-10-CM

## 2017-02-24 DIAGNOSIS — E04.2 NONTOXIC MULTINODULAR GOITER: ICD-10-CM

## 2017-02-24 LAB
ALBUMIN SERPL BCP-MCNC: 3.6 G/DL
ALP SERPL-CCNC: 69 U/L
ALT SERPL W/O P-5'-P-CCNC: 19 U/L
ANION GAP SERPL CALC-SCNC: 9 MMOL/L
AST SERPL-CCNC: 26 U/L
BILIRUB SERPL-MCNC: 0.5 MG/DL
BUN SERPL-MCNC: 8 MG/DL
CA-I BLDV-SCNC: 1.04 MMOL/L
CALCIUM SERPL-MCNC: 8.5 MG/DL
CHLORIDE SERPL-SCNC: 100 MMOL/L
CO2 SERPL-SCNC: 28 MMOL/L
CREAT SERPL-MCNC: 0.8 MG/DL
EST. GFR  (AFRICAN AMERICAN): >60 ML/MIN/1.73 M^2
EST. GFR  (NON AFRICAN AMERICAN): >60 ML/MIN/1.73 M^2
GLUCOSE SERPL-MCNC: 101 MG/DL
POTASSIUM SERPL-SCNC: 4.4 MMOL/L
PROT SERPL-MCNC: 7.9 G/DL
PTH-INTACT SERPL-MCNC: <5 PG/ML
SODIUM SERPL-SCNC: 137 MMOL/L

## 2017-02-24 PROCEDURE — 80053 COMPREHEN METABOLIC PANEL: CPT

## 2017-02-24 PROCEDURE — 99024 POSTOP FOLLOW-UP VISIT: CPT | Mod: S$GLB,,, | Performed by: NURSE PRACTITIONER

## 2017-02-24 PROCEDURE — 36415 COLL VENOUS BLD VENIPUNCTURE: CPT

## 2017-02-24 PROCEDURE — 99999 PR PBB SHADOW E&M-EST. PATIENT-LVL III: CPT | Mod: PBBFAC,,, | Performed by: NURSE PRACTITIONER

## 2017-02-24 PROCEDURE — 82330 ASSAY OF CALCIUM: CPT

## 2017-02-24 PROCEDURE — 83970 ASSAY OF PARATHORMONE: CPT

## 2017-02-24 RX ORDER — LEVOTHYROXINE SODIUM 125 UG/1
TABLET ORAL
COMMUNITY
Start: 2017-02-22 | End: 2017-03-17

## 2017-02-24 RX ORDER — ERGOCALCIFEROL 1.25 MG/1
22 CAPSULE ORAL
COMMUNITY
Start: 2017-02-03 | End: 2022-08-19

## 2017-02-24 NOTE — TELEPHONE ENCOUNTER
Called patient to discuss labwork. Will increase rocaltrol to 0.5mcg and increase calcium to 1500mg three times daily. She will hold her medications and call our office if she becomes fatigued or for any other concerns. Will recheck labs next week. Patient verbalized understanding.

## 2017-02-24 NOTE — PROGRESS NOTES
Subjective:       Patient ID: Kristina Graham is a 46 y.o. female.    Chief Complaint: Post-op Evaluation    HPI     Kristina Graham returns for a post op visit. She is doing well overall, however she is experiencing tingling to her fingers and face. She is taking 1000 mg of calcium TID and Rocaltrol 0.25mcg daily. Her pain is well controlled. She has no other complaints.    Past Medical History:   Diagnosis Date    Arthritis     History of vitamin D deficiency 2016    Nontoxic multinodular goiter 2017    Rheumatoid arthritis(714.0)        Past Surgical History:   Procedure Laterality Date    ABDOMINAL SURGERY       SECTION, LOW TRANSVERSE           Current Outpatient Prescriptions:     calcitRIOL (ROCALTROL) 0.25 MCG Cap, Take 1 capsule (0.25 mcg total) by mouth once daily., Disp: 60 capsule, Rfl: 0    calcium carbonate (OS-STEVE) 500 mg calcium (1,250 mg) tablet, Take 2 tablets (1,000 mg total) by mouth 3 (three) times daily., Disp: 60 tablet, Rfl: 0    ergocalciferol (ERGOCALCIFEROL) 50,000 unit Cap, , Disp: , Rfl:     spironolactone (ALDACTONE) 25 MG tablet, Take 100 mg by mouth once daily. , Disp: , Rfl:     SYNTHROID 125 mcg tablet, , Disp: , Rfl:     etodolac (LODINE XL) 400 MG 24 hr tablet, Take 1 tablet (400 mg total) by mouth once daily., Disp: 90 tablet, Rfl: 1    ondansetron (ZOFRAN-ODT) 8 MG TbDL, Take 1 tablet (8 mg total) by mouth every 8 (eight) hours as needed., Disp: 25 tablet, Rfl: 0    oxycodone-acetaminophen (PERCOCET) 5-325 mg per tablet, Take 1 tablet by mouth every 4 (four) hours as needed., Disp: 30 tablet, Rfl: 0    Review of patient's allergies indicates:   Allergen Reactions    Bactrim [sulfamethoxazole-trimethoprim]        Social History     Social History    Marital status:      Spouse name: N/A    Number of children: N/A    Years of education: N/A     Occupational History    Not on file.     Social History Main Topics    Smoking status:  Never Smoker    Smokeless tobacco: Not on file      Comment: drug rep    Alcohol use Yes      Comment: social    Drug use: No    Sexual activity: Yes     Partners: Male     Birth control/ protection: Surgical     Other Topics Concern    Not on file     Social History Narrative       Family History   Problem Relation Age of Onset    Cancer Mother     Diabetes Mother     Hypertension Mother     Cataracts Mother     Cancer Father     Hypertension Father     Heart disease Father     Heart disease Paternal Grandfather     Hypertension Paternal Grandfather     Hypertension Sister     Cancer Maternal Aunt     Cancer Paternal Uncle     Hypertension Paternal Uncle     Cancer Maternal Grandmother     Diabetes Maternal Grandmother     Hypertension Maternal Grandmother     Glaucoma Maternal Grandmother     Heart disease Paternal Grandmother     Hypertension Paternal Grandmother     Glaucoma Maternal Grandfather            Review of Systems   Constitutional: Negative for appetite change, chills, diaphoresis, fatigue, fever and unexpected weight change.   HENT: Positive for sore throat and trouble swallowing. Negative for congestion, dental problem, drooling, ear discharge, ear pain, facial swelling, hearing loss, mouth sores, nosebleeds, postnasal drip, rhinorrhea, sinus pressure, sneezing, tinnitus and voice change.    Eyes: Negative for pain, discharge, redness and itching.   Respiratory: Negative for cough and shortness of breath.    Cardiovascular: Negative for chest pain.   Gastrointestinal: Negative for abdominal distention, abdominal pain, diarrhea, nausea and vomiting.   Endocrine: Negative for cold intolerance and heat intolerance.   Genitourinary: Negative for difficulty urinating.   Musculoskeletal: Negative for neck pain and neck stiffness.   Skin: Positive for wound. Negative for rash.   Neurological: Negative for dizziness, weakness and headaches.        Paraesthesias    Hematological:  Negative for adenopathy.       Objective:      Physical Exam   Constitutional: She is oriented to person, place, and time. She appears well-developed and well-nourished. No distress.   HENT:   Head: Normocephalic and atraumatic.   Neck:   Neck incision CDI.  No redness, drainage, or fluid collection noted.  Edges well approximated.     Cardiovascular: Normal rate and regular rhythm.    Pulmonary/Chest: Effort normal. No respiratory distress.   Neurological: She is alert and oriented to person, place, and time. No cranial nerve deficit.   + Chovstek's sign    Skin: Skin is warm and dry. No rash noted. She is not diaphoretic. No erythema. No pallor.   Psychiatric: She has a normal mood and affect. Her behavior is normal.   Vitals reviewed.      Assessment:       1. Nontoxic multinodular goiter        Plan:       Kristina Graham is healing well 4 days s/p total thyroidectomy.  She is still hypocalcemic despite her current medication regimen. I will check her lab work and will call her with medication changes. I instructed her to take some TUMS after her labs are drawn. She verbalized understanding. She will RTC in 3 weeks, sooner if needed.

## 2017-03-01 ENCOUNTER — LAB VISIT (OUTPATIENT)
Dept: LAB | Facility: HOSPITAL | Age: 47
End: 2017-03-01
Attending: NURSE PRACTITIONER
Payer: COMMERCIAL

## 2017-03-01 DIAGNOSIS — E83.51 HYPOCALCEMIA: ICD-10-CM

## 2017-03-01 LAB
CA-I BLDV-SCNC: 1.16 MMOL/L
CALCIUM SERPL-MCNC: 9.7 MG/DL
PTH-INTACT SERPL-MCNC: <5 PG/ML

## 2017-03-01 PROCEDURE — 82310 ASSAY OF CALCIUM: CPT

## 2017-03-01 PROCEDURE — 82330 ASSAY OF CALCIUM: CPT

## 2017-03-01 PROCEDURE — 36415 COLL VENOUS BLD VENIPUNCTURE: CPT

## 2017-03-01 PROCEDURE — 83970 ASSAY OF PARATHORMONE: CPT

## 2017-03-02 ENCOUNTER — PATIENT MESSAGE (OUTPATIENT)
Dept: OTOLARYNGOLOGY | Facility: CLINIC | Age: 47
End: 2017-03-02

## 2017-03-14 ENCOUNTER — TELEPHONE (OUTPATIENT)
Dept: OTOLARYNGOLOGY | Facility: CLINIC | Age: 47
End: 2017-03-14

## 2017-03-14 DIAGNOSIS — E04.2 NONTOXIC MULTINODULAR GOITER: Primary | ICD-10-CM

## 2017-03-17 ENCOUNTER — LAB VISIT (OUTPATIENT)
Dept: LAB | Facility: HOSPITAL | Age: 47
End: 2017-03-17
Payer: COMMERCIAL

## 2017-03-17 ENCOUNTER — OFFICE VISIT (OUTPATIENT)
Dept: OTOLARYNGOLOGY | Facility: CLINIC | Age: 47
End: 2017-03-17
Payer: COMMERCIAL

## 2017-03-17 ENCOUNTER — PATIENT MESSAGE (OUTPATIENT)
Dept: OTOLARYNGOLOGY | Facility: CLINIC | Age: 47
End: 2017-03-17

## 2017-03-17 VITALS
WEIGHT: 191.13 LBS | SYSTOLIC BLOOD PRESSURE: 113 MMHG | TEMPERATURE: 97 F | HEART RATE: 72 BPM | BODY MASS INDEX: 28.23 KG/M2 | DIASTOLIC BLOOD PRESSURE: 78 MMHG

## 2017-03-17 DIAGNOSIS — E83.51 HYPOCALCEMIA: ICD-10-CM

## 2017-03-17 DIAGNOSIS — E89.0 S/P THYROIDECTOMY: ICD-10-CM

## 2017-03-17 DIAGNOSIS — E04.2 NONTOXIC MULTINODULAR GOITER: Primary | ICD-10-CM

## 2017-03-17 DIAGNOSIS — E04.2 NONTOXIC MULTINODULAR GOITER: ICD-10-CM

## 2017-03-17 PROBLEM — Z98.890 S/P THYROIDECTOMY: Status: ACTIVE | Noted: 2017-03-17

## 2017-03-17 PROBLEM — Z90.89 S/P THYROIDECTOMY: Status: ACTIVE | Noted: 2017-03-17

## 2017-03-17 LAB
CA-I BLDV-SCNC: 1.09 MMOL/L
CALCIUM SERPL-MCNC: 8.7 MG/DL
PTH-INTACT SERPL-MCNC: <5 PG/ML
T4 FREE SERPL-MCNC: 1.45 NG/DL
TSH SERPL DL<=0.005 MIU/L-ACNC: 0.09 UIU/ML

## 2017-03-17 PROCEDURE — 82330 ASSAY OF CALCIUM: CPT

## 2017-03-17 PROCEDURE — 99024 POSTOP FOLLOW-UP VISIT: CPT | Mod: S$GLB,,, | Performed by: NURSE PRACTITIONER

## 2017-03-17 PROCEDURE — 99999 PR PBB SHADOW E&M-EST. PATIENT-LVL III: CPT | Mod: PBBFAC,,, | Performed by: NURSE PRACTITIONER

## 2017-03-17 PROCEDURE — 83970 ASSAY OF PARATHORMONE: CPT

## 2017-03-17 PROCEDURE — 36415 COLL VENOUS BLD VENIPUNCTURE: CPT

## 2017-03-17 PROCEDURE — 84443 ASSAY THYROID STIM HORMONE: CPT

## 2017-03-17 PROCEDURE — 82310 ASSAY OF CALCIUM: CPT

## 2017-03-17 PROCEDURE — 84439 ASSAY OF FREE THYROXINE: CPT

## 2017-03-17 RX ORDER — POLYETHYLENE GLYCOL 3350 17 G/17G
17 POWDER, FOR SOLUTION ORAL DAILY
Qty: 30 PACKET | Refills: 2 | Status: SHIPPED | OUTPATIENT
Start: 2017-03-17 | End: 2017-07-24

## 2017-03-17 RX ORDER — LEVOTHYROXINE SODIUM 112 UG/1
112 TABLET ORAL DAILY
Qty: 30 TABLET | Refills: 11 | Status: SHIPPED | OUTPATIENT
Start: 2017-03-17 | End: 2018-05-29

## 2017-03-17 RX ORDER — CALCITRIOL 0.25 UG/1
0.5 CAPSULE ORAL DAILY
Qty: 60 CAPSULE | Refills: 0 | Status: SHIPPED | OUTPATIENT
Start: 2017-03-17 | End: 2017-04-09 | Stop reason: SDUPTHER

## 2017-03-17 NOTE — PROGRESS NOTES
Subjective:       Patient ID: Kristina Graham is a 46 y.o. female.    Chief Complaint: Post-op Evaluation    HPI     Kristina Graham returns for a post op visit. She is doing well overall, however she is experiencing tingling to her fingers and face. She is taking 1500 mg of calcium TID and Rocaltrol 0.5mcg daily. Her pain is well controlled. She has no other complaints. She attempted to decrease her Rocaltrol, but states she felt numbness and tingling, so she restarted it. She has decreased calcium 2 pills TID. She reports feeling fatigued and weak. She feels like she is in a fog. She has an upcoming appointment with endocrine to further manage her hypocalcemia.    Past Medical History:   Diagnosis Date    Arthritis     History of vitamin D deficiency 2016    Nontoxic multinodular goiter 2017    Rheumatoid arthritis(714.0)        Past Surgical History:   Procedure Laterality Date    ABDOMINAL SURGERY       SECTION, LOW TRANSVERSE           Current Outpatient Prescriptions:     calcitRIOL (ROCALTROL) 0.25 MCG Cap, Take 1 capsule (0.25 mcg total) by mouth once daily., Disp: 60 capsule, Rfl: 0    calcium carbonate (OS-STEVE) 500 mg calcium (1,250 mg) tablet, Take 2 tablets (1,000 mg total) by mouth 3 (three) times daily., Disp: 60 tablet, Rfl: 0    ergocalciferol (ERGOCALCIFEROL) 50,000 unit Cap, , Disp: , Rfl:     etodolac (LODINE XL) 400 MG 24 hr tablet, Take 1 tablet (400 mg total) by mouth once daily., Disp: 90 tablet, Rfl: 1    ondansetron (ZOFRAN-ODT) 8 MG TbDL, Take 1 tablet (8 mg total) by mouth every 8 (eight) hours as needed., Disp: 25 tablet, Rfl: 0    oxycodone-acetaminophen (PERCOCET) 5-325 mg per tablet, Take 1 tablet by mouth every 4 (four) hours as needed., Disp: 30 tablet, Rfl: 0    polyethylene glycol (GLYCOLAX) 17 gram PwPk, Take 17 g by mouth once daily., Disp: 30 packet, Rfl: 2    spironolactone (ALDACTONE) 25 MG tablet, Take 100 mg by mouth once daily. , Disp: ,  Rfl:     SYNTHROID 125 mcg tablet, , Disp: , Rfl:     Review of patient's allergies indicates:   Allergen Reactions    Bactrim [sulfamethoxazole-trimethoprim]        Social History     Social History    Marital status:      Spouse name: N/A    Number of children: N/A    Years of education: N/A     Occupational History    Not on file.     Social History Main Topics    Smoking status: Never Smoker    Smokeless tobacco: Not on file      Comment: drug rep    Alcohol use Yes      Comment: social    Drug use: No    Sexual activity: Yes     Partners: Male     Birth control/ protection: Surgical     Other Topics Concern    Not on file     Social History Narrative       Family History   Problem Relation Age of Onset    Cancer Mother     Diabetes Mother     Hypertension Mother     Cataracts Mother     Cancer Father     Hypertension Father     Heart disease Father     Heart disease Paternal Grandfather     Hypertension Paternal Grandfather     Hypertension Sister     Cancer Maternal Aunt     Cancer Paternal Uncle     Hypertension Paternal Uncle     Cancer Maternal Grandmother     Diabetes Maternal Grandmother     Hypertension Maternal Grandmother     Glaucoma Maternal Grandmother     Heart disease Paternal Grandmother     Hypertension Paternal Grandmother     Glaucoma Maternal Grandfather            Review of Systems   Constitutional: Negative for appetite change, chills, diaphoresis, fatigue, fever and unexpected weight change.   HENT: Positive for sore throat and trouble swallowing. Negative for congestion, dental problem, drooling, ear discharge, ear pain, facial swelling, hearing loss, mouth sores, nosebleeds, postnasal drip, rhinorrhea, sinus pressure, sneezing, tinnitus and voice change.    Eyes: Negative for pain, discharge, redness and itching.   Respiratory: Negative for cough and shortness of breath.    Cardiovascular: Negative for chest pain.   Gastrointestinal: Negative for  abdominal distention, abdominal pain, diarrhea, nausea and vomiting.   Endocrine: Negative for cold intolerance and heat intolerance.   Genitourinary: Negative for difficulty urinating.   Musculoskeletal: Negative for neck pain and neck stiffness.   Skin: Positive for wound. Negative for rash.   Neurological: Negative for dizziness, weakness and headaches.        Paraesthesias    Hematological: Negative for adenopathy.       Objective:      Physical Exam   Constitutional: She is oriented to person, place, and time. She appears well-developed and well-nourished. No distress.   HENT:   Head: Normocephalic and atraumatic.   Neck:   Neck incision CDI.  No redness, drainage, or fluid collection noted.  Edges well approximated.     Cardiovascular: Normal rate and regular rhythm.    Pulmonary/Chest: Effort normal. No respiratory distress.   Neurological: She is alert and oriented to person, place, and time. No cranial nerve deficit.   - Chovstek's sign    Skin: Skin is warm and dry. No rash noted. She is not diaphoretic. No erythema. No pallor.   Psychiatric: She has a normal mood and affect. Her behavior is normal.   Vitals reviewed.      FINAL PATHOLOGIC DIAGNOSIS   1 RIGHT PARATRACHEAL LYMPH NODE:   3 LYMPH NODES WITH NO METASTATIC CARCINOMA IDENTIFIED.   2 TISSUE AT LEFT BERRY'S LIGAMENT:   THYROID TISSUE WITH NO CARCINOMA IDENTIFIED.   3 PYRAMIDAL LOBE:   BENIGN FIBROADIPOSE TISSUE.   ONE LYMPH NODE WITH NO METASTATIC CARCINOMA IDENTIFIED.   4 TOTAL THYROIDECTOMY:   THYROID TISSUE WITH FOLLICULAR NODULES.   BENIGN PARATHYROID TISSUE IDENTIFIED.   NO CARCINOMA IDENTIFIED.      Assessment:       1. S/P thyroidectomy        Plan:       Kristina Graham is healing well 3 weeks s/p total thyroidectomy. I have ordered thyroid labs, PTH, and calcium levels. I will check her lab work and will call her with medication changes.

## 2017-04-09 RX ORDER — CALCITRIOL 0.25 UG/1
CAPSULE ORAL
Qty: 60 CAPSULE | Refills: 0 | Status: SHIPPED | OUTPATIENT
Start: 2017-04-09 | End: 2018-04-12 | Stop reason: SDUPTHER

## 2017-04-12 ENCOUNTER — PATIENT MESSAGE (OUTPATIENT)
Dept: OTOLARYNGOLOGY | Facility: CLINIC | Age: 47
End: 2017-04-12

## 2017-04-17 ENCOUNTER — PATIENT MESSAGE (OUTPATIENT)
Dept: OTOLARYNGOLOGY | Facility: CLINIC | Age: 47
End: 2017-04-17

## 2017-06-05 ENCOUNTER — PATIENT MESSAGE (OUTPATIENT)
Dept: RHEUMATOLOGY | Facility: CLINIC | Age: 47
End: 2017-06-05

## 2017-07-24 ENCOUNTER — OFFICE VISIT (OUTPATIENT)
Dept: RHEUMATOLOGY | Facility: CLINIC | Age: 47
End: 2017-07-24
Payer: COMMERCIAL

## 2017-07-24 VITALS
SYSTOLIC BLOOD PRESSURE: 114 MMHG | BODY MASS INDEX: 29.31 KG/M2 | HEIGHT: 69 IN | WEIGHT: 197.88 LBS | TEMPERATURE: 98 F | DIASTOLIC BLOOD PRESSURE: 79 MMHG | HEART RATE: 64 BPM

## 2017-07-24 DIAGNOSIS — R53.83 FATIGUE, UNSPECIFIED TYPE: ICD-10-CM

## 2017-07-24 DIAGNOSIS — R76.8 RHEUMATOID FACTOR POSITIVE: Primary | ICD-10-CM

## 2017-07-24 DIAGNOSIS — M25.649 STIFFNESS OF HAND JOINT, UNSPECIFIED LATERALITY: ICD-10-CM

## 2017-07-24 PROCEDURE — 99999 PR PBB SHADOW E&M-EST. PATIENT-LVL III: CPT | Mod: PBBFAC,,, | Performed by: INTERNAL MEDICINE

## 2017-07-24 PROCEDURE — 99214 OFFICE O/P EST MOD 30 MIN: CPT | Mod: S$GLB,,, | Performed by: INTERNAL MEDICINE

## 2017-07-24 ASSESSMENT — ROUTINE ASSESSMENT OF PATIENT INDEX DATA (RAPID3)
MDHAQ FUNCTION SCORE: .1
AM STIFFNESS SCORE: 1, YES
PAIN SCORE: 3
PATIENT GLOBAL ASSESSMENT SCORE: 5
PSYCHOLOGICAL DISTRESS SCORE: 0
TOTAL RAPID3 SCORE: 2.78
FATIGUE SCORE: 5

## 2017-07-24 NOTE — PROGRESS NOTES
"Subjective:       Patient ID: Kristina Graham is a 46 y.o. female.    Chief Complaint: Follow-up      HPI:  Kristina Graham is a 46 y.o. female history of positive RF and joint pain.  No treatment given. Returns due to joint pains. Pain bilateral thumbs and ankles.  Pain 3/10 ache. Improves on its own and worsens with movement regarding hands.  Ankles worsen at end of day (wears heels) and improves with elevation.   Morning stiffness for 1 hour. Several months of fatigue.  Difficulty falling asleep.      Left peroneal nerve compression on EMG for left toe numbness in 2011 patient declined surgery.     History of B12 deficiency, vitamin D deficiency and acne treated with spironolactone.  In 2010 Parvovirus and EBV infections. IgM positive for both.          Feb 20, 2017 had total thyroid removed and one parathyroid removed.  She has had low calcium since then.  She takes calcitriol 0.25 mcg ten tabs daily.    She notes stiffness, swelling and pain in fingers, wrists, knees and ankles since surgery.   Occasionally takes Lodine and it helps. Pain in hands 3/10 ache.  Worsened with activity.        Review of Systems   Constitutional: Positive for fatigue and unexpected weight change.        Gaining weight   Eyes: Negative.    Respiratory: Negative.    Cardiovascular: Negative.    Gastrointestinal: Negative.    Endocrine: Negative.    Genitourinary: Negative.    Musculoskeletal: Positive for arthralgias and myalgias.   Skin: Negative.    Allergic/Immunologic: Negative.    Neurological: Negative.    Hematological: Negative.    Psychiatric/Behavioral: Negative.          Objective:   /79 (BP Location: Left arm, Patient Position: Sitting, BP Method: Automatic)   Pulse 64   Temp 98.1 °F (36.7 °C)   Ht 5' 9" (1.753 m)   Wt 89.8 kg (197 lb 14.4 oz)   BMI 29.22 kg/m²      Physical Exam   Constitutional: She is oriented to person, place, and time and well-developed, well-nourished, and in no distress.   HENT: "   Head: Normocephalic and atraumatic.   Eyes: Conjunctivae and EOM are normal.   Neck: Neck supple.   Cardiovascular: Normal rate, regular rhythm and normal heart sounds.    Pulmonary/Chest: Effort normal and breath sounds normal.   Abdominal: Soft. Bowel sounds are normal.   Neurological: She is alert and oriented to person, place, and time. Gait normal.   Skin: Skin is warm and dry.     Psychiatric: Mood and affect normal.   Musculoskeletal: Normal range of motion. She exhibits edema. She exhibits no tenderness or deformity.   Swelling around lateral malleolus       LABS    Component      Latest Ref Rng & Units 3/17/2017 2/24/2017 2/9/2017   WBC      3.90 - 12.70 K/uL   9.74   RBC      4.00 - 5.40 M/uL   4.94   Hemoglobin      12.0 - 16.0 g/dL   14.9   Hematocrit      37.0 - 48.5 %   43.4   MCV      82 - 98 fL   88   MCH      27.0 - 31.0 pg   30.2   MCHC      32.0 - 36.0 %   34.3   RDW      11.5 - 14.5 %   12.2   Platelets      150 - 350 K/uL   278   MPV      9.2 - 12.9 fL   9.6   Gran #      1.8 - 7.7 K/uL   6.9   Lymph #      1.0 - 4.8 K/uL   2.0   Mono #      0.3 - 1.0 K/uL   0.6   Eos #      0.0 - 0.5 K/uL   0.1   Baso #      0.00 - 0.20 K/uL   0.04   Gran%      38.0 - 73.0 %   71.1   Lymph%      18.0 - 48.0 %   20.8   Mono%      4.0 - 15.0 %   6.3   Eosinophil%      0.0 - 8.0 %   1.1   Basophil%      0.0 - 1.9 %   0.4   Differential Method         Automated   Sodium      136 - 145 mmol/L  137    Potassium      3.5 - 5.1 mmol/L  4.4    Chloride      95 - 110 mmol/L  100    CO2      23 - 29 mmol/L  28    Glucose      70 - 110 mg/dL  101    BUN, Bld      6 - 20 mg/dL  8    Creatinine      0.5 - 1.4 mg/dL  0.8    Calcium      8.7 - 10.5 mg/dL 8.7 8.5 (L)    Total Protein      6.0 - 8.4 g/dL  7.9    Albumin      3.5 - 5.2 g/dL  3.6    Total Bilirubin      0.1 - 1.0 mg/dL  0.5    Alkaline Phosphatase      55 - 135 U/L  69    AST      10 - 40 U/L  26    ALT      10 - 44 U/L  19    Anion Gap      8 - 16 mmol/L  9     eGFR if African American      >60 mL/min/1.73 m:2  >60.0    eGFR if non African American      >60 mL/min/1.73 m:2  >60.0    PTH      9.0 - 77.0 pg/mL <5.0 (L)     Calcium, Ion      1.06 - 1.42 mmol/L 1.09     TSH      0.400 - 4.000 uIU/mL 0.086 (L)     Free T4      0.71 - 1.51 ng/dL 1.45       Component      Latest Ref Rng & Units 9/27/2016   Anti-SSA Antibody      0.00 - 19.99 EU 0.33   Anti-SSA Interpretation      Negative Negative   YESI Screen      Negative <1:160 Negative <1:160   CRP      0.0 - 8.2 mg/L 0.8   Rheumatoid Factor      0.0 - 15.0 IU/mL 18.0 (H)   Sed Rate      0 - 20 mm/Hr 16   ds DNA Ab      Negative 1:10 Negative 1:10      Assessment:       1. Joint pain/swelling/stiffness  2. S/p Thyroidectomy complicated by hypoparathyroidism.  3. Positive rheumatoid factor  4. Fatigue   5. Overweight.   6. Vitamin D deficiency.  Mildly low.  Currently on OTC 2,000 units daily  7. History vitamin B12 deficiency   Plan:       1. Labs  2. Continue Lodine  3. RTO 4 months/prn

## 2017-07-25 ENCOUNTER — PATIENT MESSAGE (OUTPATIENT)
Dept: RHEUMATOLOGY | Facility: CLINIC | Age: 47
End: 2017-07-25

## 2017-07-26 ENCOUNTER — OFFICE VISIT (OUTPATIENT)
Dept: OPTOMETRY | Facility: CLINIC | Age: 47
End: 2017-07-26
Payer: COMMERCIAL

## 2017-07-26 ENCOUNTER — PATIENT MESSAGE (OUTPATIENT)
Dept: RHEUMATOLOGY | Facility: CLINIC | Age: 47
End: 2017-07-26

## 2017-07-26 DIAGNOSIS — H52.11 MYOPIA WITH ASTIGMATISM AND PRESBYOPIA, RIGHT: ICD-10-CM

## 2017-07-26 DIAGNOSIS — H16.223 KERATOCONJUNCTIVITIS SICCA, NOT SPECIFIED AS SJÖGREN'S, BILATERAL: ICD-10-CM

## 2017-07-26 DIAGNOSIS — H52.4 MYOPIA WITH ASTIGMATISM AND PRESBYOPIA, RIGHT: ICD-10-CM

## 2017-07-26 DIAGNOSIS — H52.201 MYOPIA WITH ASTIGMATISM AND PRESBYOPIA, RIGHT: ICD-10-CM

## 2017-07-26 DIAGNOSIS — Z01.00 EXAMINATION OF EYES AND VISION: Primary | ICD-10-CM

## 2017-07-26 PROCEDURE — 92014 COMPRE OPH EXAM EST PT 1/>: CPT | Mod: S$GLB,,, | Performed by: OPTOMETRIST

## 2017-07-26 PROCEDURE — 92015 DETERMINE REFRACTIVE STATE: CPT | Mod: S$GLB,,, | Performed by: OPTOMETRIST

## 2017-07-26 PROCEDURE — 99999 PR PBB SHADOW E&M-EST. PATIENT-LVL II: CPT | Mod: PBBFAC,,, | Performed by: OPTOMETRIST

## 2017-07-26 NOTE — PROGRESS NOTES
HPI     DLS: 7/14/2016  Pt states she is on thyroid medication that causes va blurry, several   adjustment since thyroidectomy   +Tearing OS   +Itching  Denies f/f    No gtts    Last edited by Adal Villaseñor, OD on 7/26/2017  9:48 AM. (History)            Assessment /Plan     For exam results, see Encounter Report.    Examination of eyes and vision  -annual dfe    Keratoconjunctivitis sicca, not specified as Sjögren's, bilateral  -Dry associated with thyroid  -Optive QID  -Consider Xiidra if no improvement with thyroid control    Myopia with astigmatism and presbyopia, right  Eyeglass Final Rx     Eyeglass Final Rx       Sphere Cylinder Axis Dist VA Add    Right -0.75 +0.75 090 20/20 +1.50    Left [place] Sphere  20/20 +1.50    Type:  SVL    Expiration Date:  7/27/2018                  RTC 1 yr

## 2017-07-27 ENCOUNTER — PATIENT MESSAGE (OUTPATIENT)
Dept: RHEUMATOLOGY | Facility: CLINIC | Age: 47
End: 2017-07-27

## 2017-07-27 DIAGNOSIS — R76.8 RHEUMATOID FACTOR POSITIVE: Primary | ICD-10-CM

## 2017-07-27 DIAGNOSIS — R53.83 FATIGUE, UNSPECIFIED TYPE: ICD-10-CM

## 2017-07-27 DIAGNOSIS — M25.649 STIFFNESS OF HAND JOINT, UNSPECIFIED LATERALITY: ICD-10-CM

## 2017-08-04 ENCOUNTER — HOSPITAL ENCOUNTER (EMERGENCY)
Facility: HOSPITAL | Age: 47
Discharge: HOME | End: 2017-08-04
Attending: EMERGENCY MEDICINE
Payer: COMMERCIAL

## 2017-08-04 VITALS
DIASTOLIC BLOOD PRESSURE: 66 MMHG | SYSTOLIC BLOOD PRESSURE: 113 MMHG | WEIGHT: 180 LBS | OXYGEN SATURATION: 100 % | RESPIRATION RATE: 16 BRPM | TEMPERATURE: 98 F | HEART RATE: 60 BPM | HEIGHT: 69 IN | BODY MASS INDEX: 26.66 KG/M2

## 2017-08-04 DIAGNOSIS — R07.89 CHEST DISCOMFORT: ICD-10-CM

## 2017-08-04 DIAGNOSIS — G47.00 INSOMNIA, UNSPECIFIED TYPE: Primary | ICD-10-CM

## 2017-08-04 LAB
ALBUMIN SERPL BCP-MCNC: 3.7 G/DL
ALP SERPL-CCNC: 52 U/L
ALT SERPL W/O P-5'-P-CCNC: 13 U/L
ANION GAP SERPL CALC-SCNC: 11 MMOL/L
AST SERPL-CCNC: 15 U/L
BASOPHILS # BLD AUTO: 0.04 K/UL
BASOPHILS NFR BLD: 0.4 %
BILIRUB SERPL-MCNC: 0.5 MG/DL
BUN SERPL-MCNC: 9 MG/DL
CA-I BLDV-SCNC: 1.01 MMOL/L
CALCIUM SERPL-MCNC: 8.8 MG/DL
CHLORIDE SERPL-SCNC: 103 MMOL/L
CO2 SERPL-SCNC: 24 MMOL/L
CREAT SERPL-MCNC: 0.8 MG/DL
DIFFERENTIAL METHOD: NORMAL
EOSINOPHIL # BLD AUTO: 0.2 K/UL
EOSINOPHIL NFR BLD: 1.8 %
ERYTHROCYTE [DISTWIDTH] IN BLOOD BY AUTOMATED COUNT: 12.7 %
EST. GFR  (AFRICAN AMERICAN): >60 ML/MIN/1.73 M^2
EST. GFR  (NON AFRICAN AMERICAN): >60 ML/MIN/1.73 M^2
GLUCOSE SERPL-MCNC: 95 MG/DL
HCT VFR BLD AUTO: 39.7 %
HGB BLD-MCNC: 13.5 G/DL
LYMPHOCYTES # BLD AUTO: 3.4 K/UL
LYMPHOCYTES NFR BLD: 31 %
MCH RBC QN AUTO: 29.9 PG
MCHC RBC AUTO-ENTMCNC: 34 G/DL
MCV RBC AUTO: 88 FL
MONOCYTES # BLD AUTO: 0.9 K/UL
MONOCYTES NFR BLD: 8 %
NEUTROPHILS # BLD AUTO: 6.4 K/UL
NEUTROPHILS NFR BLD: 58.4 %
PLATELET # BLD AUTO: 250 K/UL
PMV BLD AUTO: 9.8 FL
POTASSIUM SERPL-SCNC: 3.9 MMOL/L
PROT SERPL-MCNC: 7.3 G/DL
PTH-INTACT SERPL-MCNC: <5 PG/ML
RBC # BLD AUTO: 4.52 M/UL
SODIUM SERPL-SCNC: 138 MMOL/L
TROPONIN I SERPL DL<=0.01 NG/ML-MCNC: 0.02 NG/ML
WBC # BLD AUTO: 10.99 K/UL

## 2017-08-04 PROCEDURE — 99284 EMERGENCY DEPT VISIT MOD MDM: CPT | Mod: ,,, | Performed by: EMERGENCY MEDICINE

## 2017-08-04 PROCEDURE — 99284 EMERGENCY DEPT VISIT MOD MDM: CPT | Mod: 25

## 2017-08-04 PROCEDURE — 80053 COMPREHEN METABOLIC PANEL: CPT

## 2017-08-04 PROCEDURE — 82330 ASSAY OF CALCIUM: CPT

## 2017-08-04 PROCEDURE — 85025 COMPLETE CBC W/AUTO DIFF WBC: CPT

## 2017-08-04 PROCEDURE — 93005 ELECTROCARDIOGRAM TRACING: CPT

## 2017-08-04 PROCEDURE — 84484 ASSAY OF TROPONIN QUANT: CPT

## 2017-08-04 PROCEDURE — 93010 ELECTROCARDIOGRAM REPORT: CPT | Mod: ,,, | Performed by: INTERNAL MEDICINE

## 2017-08-04 PROCEDURE — 36000 PLACE NEEDLE IN VEIN: CPT

## 2017-08-04 PROCEDURE — 83970 ASSAY OF PARATHORMONE: CPT

## 2017-08-04 NOTE — ED NOTES
Pt is AA&O times four Resp  Full and reg Breath sounds clear john paul to all lung fields Radial pulses strong and reg

## 2017-08-04 NOTE — ED TRIAGE NOTES
Pt presents to ED with c/o hypertension, anxiety, tingling, and muscle fatigue. Pt had thyroid removed in Feb and parathyroid quit working. PCP told pt these symptoms would accure if parathyroid began working again.

## 2017-08-04 NOTE — ED PROVIDER NOTES
Encounter Date: 2017       History     Chief Complaint   Patient presents with    Thyroid Problem     pt had thyroid removed and parathyroid stopped working; pt states she was told she may have symptoms if her parathyroid started working again such as heart racing and insomnia, pt reports insomnia 4 days and feeling like she can feel the blood flowing all through her veins     46-year-old female presents to the ER for evaluation possible thyroid / parathyroid malfunction.  Patient had surgery on her thyroid gland earlier this year which resulted in loss of function of her parathyroid.  Since that time she has been on calcium and vitamin D supplementation.  For the past 4 days patient has noticed increase in left chest wall discomfort, discomfort in her left arm, insomnia, twitching, and feeling like her body is an overdrive.   She was told that she may want regain function of her parathyroid glands and she is wondering if that is what is going on currently.   Patient is concerned that if her parathyroid glands are functioning, that her calcium may be elevated.  She denies any shortness of breath fever, chills.  She denies any recent travel.  She has no abdominal pain.           Review of patient's allergies indicates:   Allergen Reactions    Bactrim [sulfamethoxazole-trimethoprim]      Past Medical History:   Diagnosis Date    Arthritis     History of vitamin D deficiency 2016    Nontoxic multinodular goiter 2017    Rheumatoid arthritis(714.0)      Past Surgical History:   Procedure Laterality Date    ABDOMINAL SURGERY       SECTION, LOW TRANSVERSE       Family History   Problem Relation Age of Onset    Cancer Mother     Diabetes Mother     Hypertension Mother     Cataracts Mother     Glaucoma Mother     Cancer Father     Hypertension Father     Heart disease Father     Heart disease Paternal Grandfather     Hypertension Paternal Grandfather     Hypertension Sister     Cancer  Maternal Aunt     Cancer Paternal Uncle     Hypertension Paternal Uncle     Cancer Maternal Grandmother     Diabetes Maternal Grandmother     Hypertension Maternal Grandmother     Glaucoma Maternal Grandmother     Heart disease Paternal Grandmother     Hypertension Paternal Grandmother     Glaucoma Maternal Grandfather     Amblyopia Neg Hx     Blindness Neg Hx     Macular degeneration Neg Hx     Retinal detachment Neg Hx     Strabismus Neg Hx      Social History   Substance Use Topics    Smoking status: Never Smoker    Smokeless tobacco: Never Used      Comment: drug rep    Alcohol use Yes      Comment: social     Review of Systems   Constitutional: Negative for fever.   HENT: Negative for sore throat.    Respiratory: Negative for shortness of breath.    Cardiovascular: Positive for chest pain and palpitations. Negative for leg swelling.   Gastrointestinal: Negative for nausea.   Genitourinary: Negative for dysuria.   Musculoskeletal: Negative for back pain.   Skin: Negative for rash.   Neurological: Negative for weakness.   Hematological: Does not bruise/bleed easily.       Physical Exam     Initial Vitals [08/04/17 0323]   BP Pulse Resp Temp SpO2   (!) 158/103 77 18 97.9 °F (36.6 °C) 99 %      MAP       121.33         Physical Exam    Constitutional: Vital signs are normal. She appears well-developed and well-nourished. She is not diaphoretic. No distress.   HENT:   Head: Normocephalic and atraumatic.   Right Ear: External ear normal.   Left Ear: External ear normal.   Mouth/Throat: No oropharyngeal exudate.   Eyes: Conjunctivae are normal. Right eye exhibits no discharge. Left eye exhibits no discharge.   Neck: Neck supple.   Cardiovascular: Normal rate, regular rhythm and normal heart sounds. Exam reveals no gallop and no friction rub.    Pulmonary/Chest: No respiratory distress. She has no wheezes. She has no rhonchi. She has no rales. She exhibits no tenderness.   Abdominal: Soft. Normal  appearance, normal aorta and bowel sounds are normal. She exhibits no distension and no mass. There is no tenderness. There is no rebound and no guarding.   Musculoskeletal: Normal range of motion.   Neurological: She is alert and oriented to person, place, and time.   Skin: Skin is warm and intact.   Psychiatric: She has a normal mood and affect. Her speech is normal and behavior is normal. Cognition and memory are normal.         ED Course   Procedures  Labs Reviewed   COMPREHENSIVE METABOLIC PANEL   CBC W/ AUTO DIFFERENTIAL   PTH, INTACT   TROPONIN I   CALCIUM, IONIZED             Medical Decision Making:   ED Management:  46-year-old female with possible thyroid, parathyroid dysfunction.  Patient has a normal physical exam  I will get labs including cardiac markers, EKG, chest x-ray    Laboratory analysis in the ER  Ionized calcium at patient's baseline  CBC at baseline  Cardiac markers not elevated status post 3 days pain  EKG nonischemic  I do not believe this presentation is cardiac in nature.  TSH not checked tonight, this will be done with pt appt to see endocrinology next week  Pt CMP and PTH hemolyzed  Dispo pending, will sign out case to incoming team.               Attending Attestation:     Physician Attestation Statement for NP/PA:   I discussed this assessment and plan of this patient with the NP/PA, but I did not personally examine the patient. The face to face encounter was performed by the NP/PA.    Other NP/PA Attestation Additions:      Medical Decision Making: Labs unremarkable. EKG showed NSR without ischemic changes, independently reviewed by me.  Pt will follow up  With endocrinologist in 3 days.                   ED Course     Clinical Impression:   The primary encounter diagnosis was Insomnia, unspecified type. A diagnosis of Chest discomfort was also pertinent to this visit.                           Kari Brady MD  08/05/17 [id number]

## 2017-08-21 ENCOUNTER — LAB VISIT (OUTPATIENT)
Dept: LAB | Facility: HOSPITAL | Age: 47
End: 2017-08-21
Attending: INTERNAL MEDICINE
Payer: COMMERCIAL

## 2017-08-21 DIAGNOSIS — M25.649 STIFFNESS OF HAND JOINT, UNSPECIFIED LATERALITY: ICD-10-CM

## 2017-08-21 DIAGNOSIS — R76.8 RHEUMATOID FACTOR POSITIVE: ICD-10-CM

## 2017-08-21 DIAGNOSIS — R53.83 FATIGUE, UNSPECIFIED TYPE: ICD-10-CM

## 2017-08-21 LAB
ALBUMIN SERPL BCP-MCNC: 3.8 G/DL
ALP SERPL-CCNC: 53 U/L
ALT SERPL W/O P-5'-P-CCNC: 17 U/L
ANION GAP SERPL CALC-SCNC: 9 MMOL/L
AST SERPL-CCNC: 18 U/L
BASOPHILS # BLD AUTO: 0.04 K/UL
BASOPHILS NFR BLD: 0.4 %
BILIRUB SERPL-MCNC: 0.5 MG/DL
BUN SERPL-MCNC: 11 MG/DL
CALCIUM SERPL-MCNC: 8.7 MG/DL
CHLORIDE SERPL-SCNC: 103 MMOL/L
CO2 SERPL-SCNC: 27 MMOL/L
CREAT SERPL-MCNC: 0.8 MG/DL
CRP SERPL-MCNC: 2.4 MG/L
DIFFERENTIAL METHOD: ABNORMAL
EOSINOPHIL # BLD AUTO: 0.1 K/UL
EOSINOPHIL NFR BLD: 1.2 %
ERYTHROCYTE [DISTWIDTH] IN BLOOD BY AUTOMATED COUNT: 12.6 %
ERYTHROCYTE [SEDIMENTATION RATE] IN BLOOD BY WESTERGREN METHOD: 29 MM/HR
EST. GFR  (AFRICAN AMERICAN): >60 ML/MIN/1.73 M^2
EST. GFR  (NON AFRICAN AMERICAN): >60 ML/MIN/1.73 M^2
GLUCOSE SERPL-MCNC: 120 MG/DL
HCT VFR BLD AUTO: 36.6 %
HGB BLD-MCNC: 12.6 G/DL
LYMPHOCYTES # BLD AUTO: 2.3 K/UL
LYMPHOCYTES NFR BLD: 24.8 %
MCH RBC QN AUTO: 29.8 PG
MCHC RBC AUTO-ENTMCNC: 34.4 G/DL
MCV RBC AUTO: 87 FL
MONOCYTES # BLD AUTO: 0.7 K/UL
MONOCYTES NFR BLD: 7.8 %
NEUTROPHILS # BLD AUTO: 6 K/UL
NEUTROPHILS NFR BLD: 65.5 %
PLATELET # BLD AUTO: 226 K/UL
PMV BLD AUTO: 9.5 FL
POTASSIUM SERPL-SCNC: 3.6 MMOL/L
PROT SERPL-MCNC: 7.6 G/DL
RBC # BLD AUTO: 4.23 M/UL
SODIUM SERPL-SCNC: 139 MMOL/L
WBC # BLD AUTO: 9.22 K/UL

## 2017-08-21 PROCEDURE — 36415 COLL VENOUS BLD VENIPUNCTURE: CPT

## 2017-08-21 PROCEDURE — 80053 COMPREHEN METABOLIC PANEL: CPT

## 2017-08-21 PROCEDURE — 85025 COMPLETE CBC W/AUTO DIFF WBC: CPT

## 2017-08-21 PROCEDURE — 86140 C-REACTIVE PROTEIN: CPT

## 2017-08-21 PROCEDURE — 85652 RBC SED RATE AUTOMATED: CPT

## 2017-08-22 ENCOUNTER — PATIENT MESSAGE (OUTPATIENT)
Dept: RHEUMATOLOGY | Facility: CLINIC | Age: 47
End: 2017-08-22

## 2017-08-23 ENCOUNTER — PATIENT MESSAGE (OUTPATIENT)
Dept: RHEUMATOLOGY | Facility: CLINIC | Age: 47
End: 2017-08-23

## 2018-04-04 ENCOUNTER — HOSPITAL ENCOUNTER (EMERGENCY)
Facility: HOSPITAL | Age: 48
Discharge: HOME | End: 2018-04-04
Attending: EMERGENCY MEDICINE
Payer: COMMERCIAL

## 2018-04-04 VITALS
HEART RATE: 83 BPM | TEMPERATURE: 99 F | WEIGHT: 185 LBS | HEIGHT: 69 IN | DIASTOLIC BLOOD PRESSURE: 79 MMHG | RESPIRATION RATE: 16 BRPM | BODY MASS INDEX: 27.4 KG/M2 | SYSTOLIC BLOOD PRESSURE: 149 MMHG | OXYGEN SATURATION: 100 %

## 2018-04-04 DIAGNOSIS — R07.9 CHEST PAIN: ICD-10-CM

## 2018-04-04 DIAGNOSIS — R79.89 ELEVATED TSH: ICD-10-CM

## 2018-04-04 DIAGNOSIS — G51.4 FACIAL TWITCHING: ICD-10-CM

## 2018-04-04 DIAGNOSIS — R20.2 PARESTHESIAS: Primary | ICD-10-CM

## 2018-04-04 LAB
ALBUMIN SERPL BCP-MCNC: 4.1 G/DL
ALP SERPL-CCNC: 55 U/L
ALT SERPL W/O P-5'-P-CCNC: 10 U/L
ANION GAP SERPL CALC-SCNC: 12 MMOL/L
AST SERPL-CCNC: 16 U/L
B-HCG UR QL: NEGATIVE
BASOPHILS # BLD AUTO: 0.07 K/UL
BASOPHILS NFR BLD: 0.6 %
BILIRUB SERPL-MCNC: 0.5 MG/DL
BUN SERPL-MCNC: 11 MG/DL
CA-I BLDV-SCNC: 1.09 MMOL/L
CALCIUM SERPL-MCNC: 9.6 MG/DL
CHLORIDE SERPL-SCNC: 102 MMOL/L
CO2 SERPL-SCNC: 25 MMOL/L
CREAT SERPL-MCNC: 1.2 MG/DL
CTP QC/QA: YES
DIFFERENTIAL METHOD: ABNORMAL
EOSINOPHIL # BLD AUTO: 0.1 K/UL
EOSINOPHIL NFR BLD: 1.2 %
ERYTHROCYTE [DISTWIDTH] IN BLOOD BY AUTOMATED COUNT: 12.1 %
EST. GFR  (AFRICAN AMERICAN): >60 ML/MIN/1.73 M^2
EST. GFR  (NON AFRICAN AMERICAN): 53.9 ML/MIN/1.73 M^2
GLUCOSE SERPL-MCNC: 83 MG/DL
HCT VFR BLD AUTO: 37.6 %
HGB BLD-MCNC: 13.1 G/DL
IMM GRANULOCYTES # BLD AUTO: 0.04 K/UL
IMM GRANULOCYTES NFR BLD AUTO: 0.3 %
LYMPHOCYTES # BLD AUTO: 3.2 K/UL
LYMPHOCYTES NFR BLD: 27.3 %
MAGNESIUM SERPL-MCNC: 2.1 MG/DL
MCH RBC QN AUTO: 31.5 PG
MCHC RBC AUTO-ENTMCNC: 34.8 G/DL
MCV RBC AUTO: 90 FL
MONOCYTES # BLD AUTO: 0.7 K/UL
MONOCYTES NFR BLD: 6.2 %
NEUTROPHILS # BLD AUTO: 7.6 K/UL
NEUTROPHILS NFR BLD: 64.4 %
NRBC BLD-RTO: 0 /100 WBC
PHOSPHATE SERPL-MCNC: 4.2 MG/DL
PLATELET # BLD AUTO: 244 K/UL
PMV BLD AUTO: 9.9 FL
POTASSIUM SERPL-SCNC: 3.7 MMOL/L
PROT SERPL-MCNC: 7.8 G/DL
PTH-INTACT SERPL-MCNC: <5 PG/ML
RBC # BLD AUTO: 4.16 M/UL
SODIUM SERPL-SCNC: 139 MMOL/L
T4 FREE SERPL-MCNC: 0.64 NG/DL
TROPONIN I SERPL DL<=0.01 NG/ML-MCNC: <0.006 NG/ML
TSH SERPL DL<=0.005 MIU/L-ACNC: 18.03 UIU/ML
WBC # BLD AUTO: 11.8 K/UL

## 2018-04-04 PROCEDURE — 84484 ASSAY OF TROPONIN QUANT: CPT

## 2018-04-04 PROCEDURE — 83735 ASSAY OF MAGNESIUM: CPT

## 2018-04-04 PROCEDURE — 93005 ELECTROCARDIOGRAM TRACING: CPT

## 2018-04-04 PROCEDURE — 80053 COMPREHEN METABOLIC PANEL: CPT

## 2018-04-04 PROCEDURE — 83970 ASSAY OF PARATHORMONE: CPT

## 2018-04-04 PROCEDURE — 84439 ASSAY OF FREE THYROXINE: CPT

## 2018-04-04 PROCEDURE — 81025 URINE PREGNANCY TEST: CPT | Performed by: PHYSICIAN ASSISTANT

## 2018-04-04 PROCEDURE — 84443 ASSAY THYROID STIM HORMONE: CPT

## 2018-04-04 PROCEDURE — 93010 ELECTROCARDIOGRAM REPORT: CPT | Mod: ,,, | Performed by: INTERNAL MEDICINE

## 2018-04-04 PROCEDURE — 99284 EMERGENCY DEPT VISIT MOD MDM: CPT | Mod: 25

## 2018-04-04 PROCEDURE — 99284 EMERGENCY DEPT VISIT MOD MDM: CPT | Mod: ,,, | Performed by: PHYSICIAN ASSISTANT

## 2018-04-04 PROCEDURE — 82330 ASSAY OF CALCIUM: CPT

## 2018-04-04 PROCEDURE — 85025 COMPLETE CBC W/AUTO DIFF WBC: CPT

## 2018-04-04 PROCEDURE — 84100 ASSAY OF PHOSPHORUS: CPT

## 2018-04-04 RX ORDER — LANOLIN ALCOHOL/MO/W.PET/CERES
400 CREAM (GRAM) TOPICAL DAILY
COMMUNITY

## 2018-04-04 NOTE — ED PROVIDER NOTES
Encounter Date: 2018    SCRIBE #1 NOTE: I, Domi Hatch, am scribing for, and in the presence of,  Dr. Guerra. I have scribed the following portions of the note - the APC attestation.       History     Chief Complaint   Patient presents with    twitch     low calcium , no parathyroids, taken off oscal yest, today twitching,     Abnormal Lab     48 yo F with a history of rheumatoid arthritis, nontoxic multinodular goiter status post thyroidectomy, hypoparathyroidism as a result of thyroidectomy presents the ED with abnormal lab values and twitching.  Patient had labs drawn 5 days ago which revealed an elevated calcium of 12.6.  She was advised of the results yesterday and instructed to discontinue use of calcium supplements.  Today, patient noticed facial twitching and numbness and tingling to her fingers.  Patient also reports left-sided chest discomfort over the past 2 weeks.  She reports associated heaviness in the left arm. She also noted her HR (via her IWatch) that her resting heart rate on a couple of occasions was in the 120s. Denies shortness of breath. Pt also concerned about some memory issues over the past month.           Review of patient's allergies indicates:   Allergen Reactions    Bactrim [sulfamethoxazole-trimethoprim]      Past Medical History:   Diagnosis Date    Arthritis     History of vitamin D deficiency 2016    Nontoxic multinodular goiter 2017    Rheumatoid arthritis(714.0)      Past Surgical History:   Procedure Laterality Date    ABDOMINAL SURGERY       SECTION, LOW TRANSVERSE       Family History   Problem Relation Age of Onset    Cancer Mother     Diabetes Mother     Hypertension Mother     Cataracts Mother     Glaucoma Mother     Cancer Father     Hypertension Father     Heart disease Father     Heart disease Paternal Grandfather     Hypertension Paternal Grandfather     Hypertension Sister     Cancer Maternal Aunt     Cancer Paternal Uncle      Hypertension Paternal Uncle     Cancer Maternal Grandmother     Diabetes Maternal Grandmother     Hypertension Maternal Grandmother     Glaucoma Maternal Grandmother     Heart disease Paternal Grandmother     Hypertension Paternal Grandmother     Glaucoma Maternal Grandfather     Amblyopia Neg Hx     Blindness Neg Hx     Macular degeneration Neg Hx     Retinal detachment Neg Hx     Strabismus Neg Hx      Social History   Substance Use Topics    Smoking status: Never Smoker    Smokeless tobacco: Never Used      Comment: drug rep    Alcohol use Yes      Comment: social     Review of Systems   Constitutional: Negative for fever.   HENT: Negative for sore throat.    Respiratory: Negative for shortness of breath.    Cardiovascular: Positive for chest pain and palpitations.   Gastrointestinal: Negative for nausea and vomiting.   Genitourinary: Negative for dysuria.   Musculoskeletal: Negative for back pain.   Skin: Negative for rash.   Neurological: Positive for numbness (fingers). Negative for weakness.        Facial twitching   Hematological: Does not bruise/bleed easily.   Psychiatric/Behavioral:        Memory issues       Physical Exam     Initial Vitals [04/04/18 1516]   BP Pulse Resp Temp SpO2   (!) 149/79 83 16 98.7 °F (37.1 °C) 100 %      MAP       102.33         Physical Exam    Nursing note and vitals reviewed.  Constitutional: She appears well-developed and well-nourished. She is not diaphoretic.  Non-toxic appearance. She does not appear ill. No distress.   HENT:   Head: Normocephalic and atraumatic.   Neck: Neck supple.   Cardiovascular: Normal rate and regular rhythm. Exam reveals no gallop and no friction rub.    No murmur heard.  Pulmonary/Chest: Effort normal and breath sounds normal. No accessory muscle usage. No tachypnea. No respiratory distress. She has no decreased breath sounds. She has no wheezes. She has no rhonchi. She has no rales.   Abdominal: She exhibits no distension.    Musculoskeletal: Normal range of motion.   Neurological: She is alert and oriented to person, place, and time. She has normal strength. No cranial nerve deficit or sensory deficit. GCS eye subscore is 4. GCS verbal subscore is 5. GCS motor subscore is 6.   Skin: Skin is warm and dry. No rash noted. No pallor.   Psychiatric: She has a normal mood and affect. Her behavior is normal.         ED Course   Procedures  Labs Reviewed   COMPREHENSIVE METABOLIC PANEL - Abnormal; Notable for the following:        Result Value    eGFR if non  53.9 (*)     All other components within normal limits   CBC W/ AUTO DIFFERENTIAL - Abnormal; Notable for the following:     MCH 31.5 (*)     All other components within normal limits   TSH - Abnormal; Notable for the following:     TSH 18.028 (*)     All other components within normal limits   PTH, INTACT - Abnormal; Notable for the following:     PTH, Intact <5.0 (*)     All other components within normal limits   T4, FREE - Abnormal; Notable for the following:     Free T4 0.64 (*)     All other components within normal limits   CALCIUM, IONIZED   TROPONIN I   MAGNESIUM   PHOSPHORUS   POCT URINE PREGNANCY             Medical Decision Making:   History:   Old Medical Records: I decided to obtain old medical records.  Differential Diagnosis:   My differential diagnosis includes but is not limited to:  Electrolyte abnormality, hormone derangement, anxiety   Clinical Tests:   Lab Tests: Ordered and Reviewed  Radiological Study: Ordered and Reviewed  Medical Tests: Ordered and Reviewed       APC / Resident Notes:   47-year-old female with a history of hyperparathyroidism presents for evaluation of elevated calcium from recent labs, facial twitching, and hand paresthesias.  Unremarkable physical exam.    Calcium is within normal limits today.  TSH was markedly elevated.  Free T4 was mildly decreased.  Patient had adjustment in her thyroid medication yesterday.  Troponin  within normal limits.  EKG without evidence of acute ischemia.  Chest x-ray without acute abnormalities.     Patient is stable for discharge.  I have advised her to follow-up with her endocrinologist for adjustments in her calcium supplementation and thyroid hormone replacement as needed.  Return precautions given. I have reviewed the patient's records and discussed this case with my supervising physician.         Scribe Attestation:   Scribe #1: I performed the above scribed service and the documentation accurately describes the services I performed. I attest to the accuracy of the note.    Attending Attestation:     Physician Attestation Statement for NP/PA:   I discussed this assessment and plan of this patient with the NP/PA, but I did not personally examine the patient. The face to face encounter was performed by the NP/PA.                     Clinical Impression:   The primary encounter diagnosis was Paresthesias. Diagnoses of Chest pain, Facial twitching, and Elevated TSH were also pertinent to this visit.    Disposition:   Disposition: Discharged  Condition: Stable                        Carlotta Mcgarry PA-C  04/04/18 1900

## 2018-04-04 NOTE — ED NOTES
Pt reports to ED with complains of chest tightness, left arm heaviness and facial twitching beginning 2 weeks ago. Pt reports having labs drawn revealing elevated calcium levels of 10.3. Pt reports palpitations and anxiousness. Pt reports headaches beginning Friday with tingling in fingers bilaterally. Pt denies SOB, N/V/D.     LOC: The patient is awake, alert and aware of environment with an appropriate affect, the patient is oriented x 3 and speaking appropriately.  APPEARANCE: Patient resting comfortably and in no acute distress, patient is clean and well groomed  SKIN: The skin is warm and dry, color consistent with ethnicity, patient has normal skin turgor and moist mucus membranes, skin intact, no breakdown or bruising noted.  MUSCULOSKELETAL: Patient moving all extremities well, no obvious swelling or deformities noted.   RESPIRATORY: Airway is open and patent, breath sounds clear throughout all lung fields; respirations are spontaneous, patient has a normal effort and rate, no accessory muscle use noted.   CARDIAC: Patient has no peripheral edema noted, heart rate   NEUROLOGIC: PERRL, 3 mm bilaterally, eyes open spontaneously, behavior appropriate to situation, follows commands, facial expression symmetrical.

## 2018-04-10 ENCOUNTER — OFFICE VISIT (OUTPATIENT)
Dept: ENDOCRINOLOGY | Facility: CLINIC | Age: 48
End: 2018-04-10
Payer: COMMERCIAL

## 2018-04-10 VITALS
HEART RATE: 88 BPM | WEIGHT: 191.38 LBS | RESPIRATION RATE: 17 BRPM | HEIGHT: 69 IN | BODY MASS INDEX: 28.35 KG/M2 | SYSTOLIC BLOOD PRESSURE: 122 MMHG | DIASTOLIC BLOOD PRESSURE: 84 MMHG

## 2018-04-10 DIAGNOSIS — E89.2 HYPOPARATHYROIDISM AFTER PROCEDURE: ICD-10-CM

## 2018-04-10 DIAGNOSIS — E89.0 S/P THYROIDECTOMY: ICD-10-CM

## 2018-04-10 DIAGNOSIS — R94.6 ABNORMAL THYROID FUNCTION TEST: ICD-10-CM

## 2018-04-10 DIAGNOSIS — E89.0 POST-SURGICAL HYPOTHYROIDISM: Primary | ICD-10-CM

## 2018-04-10 PROBLEM — E04.2 NONTOXIC MULTINODULAR GOITER: Status: RESOLVED | Noted: 2017-02-09 | Resolved: 2018-04-10

## 2018-04-10 PROCEDURE — 99204 OFFICE O/P NEW MOD 45 MIN: CPT | Mod: S$GLB,,, | Performed by: INTERNAL MEDICINE

## 2018-04-10 PROCEDURE — 99999 PR PBB SHADOW E&M-EST. PATIENT-LVL III: CPT | Mod: PBBFAC,,, | Performed by: INTERNAL MEDICINE

## 2018-04-10 NOTE — PATIENT INSTRUCTIONS
Switch the magnesium out to a multivitamin that has calcium, vitamin D and magnesium.     Decrease calcitriol to .25 mcg one tablet twice a day  Continue chewable calcium in the morning.     Continue to carry chewable calcium with you and take if needed. Keep track of this.     Continue thyroid hormone replacement    Repeat labs at any [facility] location in about three to four weeks, unless you develop symptoms (palpitations, heat intolerance, increased sweating, difficulty falling asleep or feeling tremulous.)

## 2018-04-10 NOTE — PROGRESS NOTES
Subjective:     Patient ID: Kristina Graham is a 47 y.o. female.    Chief Complaint: Consult    HPI:   Ms. Graham is a 47 y.o. female who is here for a consult visit for evaluation of hypercalcemia and hypothyroidism following thyroidectomy for a benign thyroid nodule (4 CM) one year ago. 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     Admitted for fluids for hypercalcemia one week ago, calcium 12 mg/dl, regimen was calcitriol .5 mcg twice daily and two oscals twice a day.   Her regimen has changed since then.     Today she reports feeling terrible, cognitive defects.     Post surgical hypothyroidism:  Has tried multiple doses and preparations with Dr. Rodríguez. Has felt terrible daily. Her PCP tried nature-throid and felt well. Temporarily out of medications for a few months with replacement of armour but this did not work as well. Now found nature-throid was back on the market and she has restarted, Nature throid 65 mg one and half tablets and one in the afternoon for the past seven days.   (reports 81.25 mg of NP thyroid one tablet twice a day and felt well on this dose.)    Takes it first thing in the morning with water and waits to eat thirty minutes later.     Post surgical hypocalcemia:  Reports hand cramps that does not resolve with calcium intake that occurred recently. Denies recent perioral numbness/tingling but when it has occurred it resolves with chewable calcium     Current regimen:  Calcitriol .5 mcg one capsule twice a day  Os-Edis has not used for the past seven days.   Chewable calcium 500 mg 1 tablet daily   Magnesium 400 mg one tablet at bedtime  Not taking cholecalciferol/ergocalciferol     Dietary:   Cheese a few times a week  Dark leafy greens - a few times a week  No milk or yogurt    Review of Systems   Constitutional: Negative for chills and fever.   HENT: Negative for congestion and sinus pressure.    Eyes: Negative for visual disturbance.   Respiratory:  "Negative for chest tightness and shortness of breath.    Cardiovascular: Negative for chest pain, palpitations and leg swelling.   Gastrointestinal: Negative for abdominal pain and vomiting.   Genitourinary: Negative for dysuria.   Musculoskeletal: Negative for arthralgias.   Skin: Negative for rash.   Neurological: Negative for weakness.   Hematological: Does not bruise/bleed easily.   Psychiatric/Behavioral: Negative for sleep disturbance.        Objective:     Physical Exam   Constitutional: She is oriented to person, place, and time. She appears well-developed and well-nourished. No distress.   Eyes: Conjunctivae and EOM are normal. Pupils are equal, round, and reactive to light. No scleral icterus.   Neck: Normal range of motion. Neck supple.   Mid line scar  No thyroid tissue palpated   Cardiovascular: Normal rate and intact distal pulses.    Pulmonary/Chest: Effort normal and breath sounds normal.   Neurological: She is alert and oriented to person, place, and time. She has normal reflexes.   No tremor.  Neg chevostek's    Skin: Skin is warm and dry.   Psychiatric: She has a normal mood and affect.       Vitals:    04/10/18 1115   BP: 122/84   Resp: 17   Weight: 86.8 kg (191 lb 5.8 oz)   Height: 5' 9" (1.753 m)     Results for DOMITILA ALBERTO (MRN [id number]) as of 4/10/2018 11:23   Ref. Range 4/4/2018 16:20   TSH Latest Ref Range: 0.400 - 4.000 uIU/mL 18.028 (H)   Free T4 Latest Ref Range: 0.71 - 1.51 ng/dL 0.64 (L)   PTH Latest Ref Range: 9.0 - 77.0 pg/mL <5.0 (L)   Results for DOMITILA ALBERTO (MRN [id number]) as of 4/10/2018 11:23   Ref. Range 4/4/2018 16:20   BUN, Bld Latest Ref Range: 6 - 20 mg/dL 11   Creatinine Latest Ref Range: 0.5 - 1.4 mg/dL 1.2   eGFR if non African American Latest Ref Range: >60 mL/min/1.73 m^2 53.9 (A)   eGFR if African American Latest Ref Range: >60 mL/min/1.73 m^2 >60.0   Glucose Latest Ref Range: 70 - 110 mg/dL 83   Calcium Latest Ref Range: 8.7 - 10.5 mg/dL 9.6   Results for " DOMITILA ALBERTO (MRN [id number]) as of 4/10/2018 11:34   Ref. Range 4/4/2018 16:20   Calcium Latest Ref Range: 8.7 - 10.5 mg/dL 9.6     Results for DOMITILA ALBERTO (MRN [id number]) as of 4/10/2018 11:34   Ref. Range 9/27/2016 11:41   Vit D, 25-Hydroxy Latest Ref Range: 30 - 96 ng/mL 28 (L)     FINAL PATHOLOGIC DIAGNOSIS  1 RIGHT PARATRACHEAL LYMPH NODE:  3 LYMPH NODES WITH NO METASTATIC CARCINOMA IDENTIFIED.  2 TISSUE AT LEFT BERRY'S LIGAMENT:  THYROID TISSUE WITH NO CARCINOMA IDENTIFIED.  3 PYRAMIDAL LOBE:  BENIGN FIBROADIPOSE TISSUE.  ONE LYMPH NODE WITH NO METASTATIC CARCINOMA IDENTIFIED.  4 TOTAL THYROIDECTOMY:  THYROID TISSUE WITH FOLLICULAR NODULES.  BENIGN PARATHYROID TISSUE IDENTIFIED.  NO CARCINOMA IDENTIFIED    Assessment/Plan:     1. Post-surgical hypothyroidism  - would continue T3/T4 combination for now with nature-throid as she has had multiple changes and has been feeling pretty unwell  - HOWEVER, we can simplify this regimen, no need to have twice daily and may need adjustment to dose  - discussed symptoms of hyperthyroidism - should she develop any of these she can reschedule the labs sooner.     2. S/P thyroidectomy  - may need T3 in addition to T4    3. Hypoparathyroidism after procedure  - serum calcium goal 8 - 8.5 mg/dl to avoid hypercalciuria  - need to simplify her regimen will lower calcitriol and add back a little calcium:  - One chewable in the morning and one MVI in the afternoon  - Continue to carry with her chewable calciums in the setting of acute symptoms which we discussed  - consider HCTZ  - discussed natpara    4. Abnormal thyroid function test  - needs adjustment but will wait a few weeks as she just recently had this adjusted    PLAN:  Labs in three to four weeks   Detailed instructions provided

## 2018-04-12 ENCOUNTER — PATIENT MESSAGE (OUTPATIENT)
Dept: ENDOCRINOLOGY | Facility: CLINIC | Age: 48
End: 2018-04-12

## 2018-04-12 RX ORDER — CALCITRIOL 0.25 UG/1
0.5 CAPSULE ORAL DAILY
Qty: 60 CAPSULE | Refills: 4 | Status: SHIPPED | OUTPATIENT
Start: 2018-04-12 | End: 2018-04-25 | Stop reason: SDUPTHER

## 2018-04-25 RX ORDER — CALCITRIOL 0.25 UG/1
0.5 CAPSULE ORAL DAILY
Qty: 60 CAPSULE | Refills: 4 | Status: SHIPPED | OUTPATIENT
Start: 2018-04-25 | End: 2018-04-26 | Stop reason: SDUPTHER

## 2018-04-26 RX ORDER — CALCITRIOL 0.25 UG/1
0.5 CAPSULE ORAL DAILY
Qty: 60 CAPSULE | Refills: 4 | Status: SHIPPED | OUTPATIENT
Start: 2018-04-26 | End: 2018-06-26 | Stop reason: SDUPTHER

## 2018-05-03 ENCOUNTER — LAB VISIT (OUTPATIENT)
Dept: LAB | Facility: HOSPITAL | Age: 48
End: 2018-05-03
Attending: INTERNAL MEDICINE
Payer: COMMERCIAL

## 2018-05-03 DIAGNOSIS — R94.6 ABNORMAL THYROID FUNCTION TEST: ICD-10-CM

## 2018-05-03 DIAGNOSIS — E89.0 POST-SURGICAL HYPOTHYROIDISM: ICD-10-CM

## 2018-05-03 DIAGNOSIS — E89.2 HYPOPARATHYROIDISM AFTER PROCEDURE: ICD-10-CM

## 2018-05-03 DIAGNOSIS — E89.0 S/P THYROIDECTOMY: ICD-10-CM

## 2018-05-03 LAB
25(OH)D3+25(OH)D2 SERPL-MCNC: 50 NG/ML
ALBUMIN SERPL BCP-MCNC: 4 G/DL
ANION GAP SERPL CALC-SCNC: 9 MMOL/L
BUN SERPL-MCNC: 12 MG/DL
CALCIUM SERPL-MCNC: 9.4 MG/DL
CHLORIDE SERPL-SCNC: 101 MMOL/L
CO2 SERPL-SCNC: 28 MMOL/L
CREAT SERPL-MCNC: 0.8 MG/DL
EST. GFR  (AFRICAN AMERICAN): >60 ML/MIN/1.73 M^2
EST. GFR  (NON AFRICAN AMERICAN): >60 ML/MIN/1.73 M^2
GLUCOSE SERPL-MCNC: 101 MG/DL
MAGNESIUM SERPL-MCNC: 2 MG/DL
PHOSPHATE SERPL-MCNC: 4 MG/DL
POTASSIUM SERPL-SCNC: 4.4 MMOL/L
PTH-INTACT SERPL-MCNC: <5 PG/ML
SODIUM SERPL-SCNC: 138 MMOL/L
T4 FREE SERPL-MCNC: 1.2 NG/DL
TSH SERPL DL<=0.005 MIU/L-ACNC: 0.05 UIU/ML

## 2018-05-03 PROCEDURE — 83970 ASSAY OF PARATHORMONE: CPT

## 2018-05-03 PROCEDURE — 36415 COLL VENOUS BLD VENIPUNCTURE: CPT

## 2018-05-03 PROCEDURE — 84439 ASSAY OF FREE THYROXINE: CPT

## 2018-05-03 PROCEDURE — 84443 ASSAY THYROID STIM HORMONE: CPT

## 2018-05-03 PROCEDURE — 83735 ASSAY OF MAGNESIUM: CPT

## 2018-05-03 PROCEDURE — 80069 RENAL FUNCTION PANEL: CPT

## 2018-05-03 PROCEDURE — 82306 VITAMIN D 25 HYDROXY: CPT

## 2018-05-04 ENCOUNTER — OFFICE VISIT (OUTPATIENT)
Dept: URGENT CARE | Facility: CLINIC | Age: 48
End: 2018-05-04
Payer: COMMERCIAL

## 2018-05-04 ENCOUNTER — PATIENT MESSAGE (OUTPATIENT)
Dept: ENDOCRINOLOGY | Facility: CLINIC | Age: 48
End: 2018-05-04

## 2018-05-04 ENCOUNTER — TELEPHONE (OUTPATIENT)
Dept: ENDOCRINOLOGY | Facility: CLINIC | Age: 48
End: 2018-05-04

## 2018-05-04 VITALS
OXYGEN SATURATION: 98 % | TEMPERATURE: 97 F | HEART RATE: 96 BPM | SYSTOLIC BLOOD PRESSURE: 130 MMHG | BODY MASS INDEX: 28.29 KG/M2 | HEIGHT: 69 IN | WEIGHT: 191 LBS | DIASTOLIC BLOOD PRESSURE: 92 MMHG | RESPIRATION RATE: 18 BRPM

## 2018-05-04 DIAGNOSIS — M54.50 ACUTE MIDLINE LOW BACK PAIN WITHOUT SCIATICA: Primary | ICD-10-CM

## 2018-05-04 DIAGNOSIS — E83.51 HYPOCALCEMIA: Primary | ICD-10-CM

## 2018-05-04 DIAGNOSIS — J20.9 ACUTE BRONCHITIS, UNSPECIFIED ORGANISM: ICD-10-CM

## 2018-05-04 LAB
B-HCG UR QL: NEGATIVE
BILIRUB UR QL STRIP: NEGATIVE
CTP QC/QA: YES
GLUCOSE UR QL STRIP: NEGATIVE
KETONES UR QL STRIP: NEGATIVE
LEUKOCYTE ESTERASE UR QL STRIP: POSITIVE
PH, POC UA: 6.5
POC BLOOD, URINE: NEGATIVE
POC NITRATES, URINE: NEGATIVE
PROT UR QL STRIP: NEGATIVE
SP GR UR STRIP: 1.01 (ref 1–1.03)
UROBILINOGEN UR STRIP-ACNC: ABNORMAL (ref 0.1–1.1)

## 2018-05-04 PROCEDURE — 99214 OFFICE O/P EST MOD 30 MIN: CPT | Mod: 25,S$GLB,, | Performed by: EMERGENCY MEDICINE

## 2018-05-04 PROCEDURE — 81003 URINALYSIS AUTO W/O SCOPE: CPT | Mod: QW,S$GLB,, | Performed by: EMERGENCY MEDICINE

## 2018-05-04 PROCEDURE — 96372 THER/PROPH/DIAG INJ SC/IM: CPT | Mod: S$GLB,,, | Performed by: EMERGENCY MEDICINE

## 2018-05-04 PROCEDURE — 81025 URINE PREGNANCY TEST: CPT | Mod: S$GLB,,, | Performed by: EMERGENCY MEDICINE

## 2018-05-04 RX ORDER — CODEINE PHOSPHATE AND GUAIFENESIN 10; 100 MG/5ML; MG/5ML
5-10 SOLUTION ORAL 3 TIMES DAILY PRN
Qty: 120 ML | Refills: 0 | Status: SHIPPED | OUTPATIENT
Start: 2018-05-04 | End: 2018-05-09

## 2018-05-04 RX ORDER — KETOROLAC TROMETHAMINE 30 MG/ML
60 INJECTION, SOLUTION INTRAMUSCULAR; INTRAVENOUS
Status: COMPLETED | OUTPATIENT
Start: 2018-05-04 | End: 2018-05-04

## 2018-05-04 RX ORDER — SPIRONOLACTONE 100 MG/1
TABLET, FILM COATED ORAL
Refills: 6 | COMMUNITY
Start: 2018-04-12 | End: 2023-02-02 | Stop reason: SDUPTHER

## 2018-05-04 RX ORDER — AMOXICILLIN 500 MG/1
500 TABLET, FILM COATED ORAL EVERY 12 HOURS
Qty: 20 TABLET | Refills: 0 | Status: SHIPPED | OUTPATIENT
Start: 2018-05-04 | End: 2018-05-14

## 2018-05-04 RX ORDER — CALCITRIOL 0.5 UG/1
CAPSULE ORAL
COMMUNITY
Start: 2018-03-12 | End: 2018-05-29 | Stop reason: DRUGHIGH

## 2018-05-04 RX ORDER — ORPHENADRINE CITRATE 100 MG/1
100 TABLET, EXTENDED RELEASE ORAL NIGHTLY PRN
Qty: 3 TABLET | Refills: 0 | Status: SHIPPED | OUTPATIENT
Start: 2018-05-04 | End: 2018-05-07

## 2018-05-04 RX ORDER — ALPRAZOLAM 0.5 MG/1
TABLET ORAL
COMMUNITY
Start: 2018-03-16 | End: 2018-05-29

## 2018-05-04 RX ADMIN — KETOROLAC TROMETHAMINE 60 MG: 30 INJECTION, SOLUTION INTRAMUSCULAR; INTRAVENOUS at 03:05

## 2018-05-04 NOTE — PROGRESS NOTES
"Subjective:       Patient ID: Kristina Graham is a 47 y.o. female.    Vitals:  height is 5' 9" (1.753 m) and weight is 86.6 kg (191 lb). Her temperature is 97 °F (36.1 °C). Her blood pressure is 130/92 (abnormal) and her pulse is 96. Her respiration is 18 and oxygen saturation is 98%.     Chief Complaint: Cough and Back Pain    This is a 47 y.o. female with Past Medical History:  who presents today with a chief complaint of a cough since Monday and lower back pain that started yesterday.  Had fever and sore throat a few d ago as well, no n/v/dysuria/diarrhea/numbness, can get into a position where the lower back pain resolves, worse with movement and coughing.        Cough   This is a new problem. The current episode started in the past 7 days. The problem has been unchanged. The problem occurs every few minutes. The cough is non-productive. Associated symptoms include headaches. Nothing aggravates the symptoms. She has tried OTC cough suppressant for the symptoms. The treatment provided no relief. There is no history of asthma.   Back Pain   This is a new problem. The current episode started yesterday. The problem occurs constantly. The problem is unchanged. The pain is present in the lumbar spine. The pain does not radiate. The pain is at a severity of 8/10. The pain is moderate. The pain is the same all the time. The symptoms are aggravated by sitting. Stiffness is present all day. Associated symptoms include headaches. She has tried nothing for the symptoms.     Review of Systems   HENT: Positive for congestion.    Respiratory: Positive for cough.    Musculoskeletal: Positive for back pain.   Neurological: Positive for headaches.       Objective:      Physical Exam   Constitutional: She is oriented to person, place, and time. She appears well-developed and well-nourished.   Occas nonprod cough   HENT:   Head: Normocephalic and atraumatic.   Nose: Nose normal.   Mouth/Throat: Oropharynx is clear and moist. "   Eyes: EOM are normal. Pupils are equal, round, and reactive to light.   Neck: Normal range of motion. Neck supple.   Cardiovascular: Normal rate, regular rhythm and normal heart sounds.    Pulmonary/Chest:   Rare coarse BS bibasilar posteriorly   Abdominal: Soft. There is no tenderness. There is no guarding.   No bilat CVAT to palp   Musculoskeletal: Normal range of motion.   Midline lumbar tender to palp, remainder of back/pelvis/hips non tender   Lymphadenopathy:     She has no cervical adenopathy.   Neurological: She is alert and oriented to person, place, and time.   Skin: Skin is warm and dry.   Psychiatric: She has a normal mood and affect. Her behavior is normal.       Assessment:       1. Acute midline low back pain without sciatica    2. Acute bronchitis, unspecified organism        Plan:         Acute midline low back pain without sciatica  -     POCT Urinalysis, Dipstick, Automated, W/O Scope  -     POCT urine pregnancy  -     ketorolac injection 60 mg; Inject 2 mLs (60 mg total) into the muscle one time.  -     orphenadrine (NORFLEX) 100 mg tablet; Take 1 tablet (100 mg total) by mouth nightly as needed for Muscle spasms.  Dispense: 3 tablet; Refill: 0    Acute bronchitis, unspecified organism  -     amoxicillin (AMOXIL) 500 MG Tab; Take 1 tablet (500 mg total) by mouth every 12 (twelve) hours.  Dispense: 20 tablet; Refill: 0  -     guaifenesin-codeine 100-10 mg/5 ml (TUSSI-ORGANIDIN NR)  mg/5 mL syrup; Take 5-10 mLs by mouth 3 (three) times daily as needed for Cough.  Dispense: 120 mL; Refill: 0      Uvaldo Tompkins MD  Go to the Emergency Department for any problems  Call your PCP for follow up next available.

## 2018-05-04 NOTE — TELEPHONE ENCOUNTER
Called patient.patient stated that she will be picking up her 24hr collection jug on Monday.patient wanted to informed  that she id going to the Urgent Care her back pains has become unbearable

## 2018-05-04 NOTE — PATIENT INSTRUCTIONS
Uvaldo Tompkins MD  Go to the Emergency Department for any problems  Call your PCP for follow up next available.    Back Pain (Acute or Chronic)    Back pain is one of the most common problems. The good news is that most people feel better in 1 to 2 weeks, and most of the rest in 1 to 2 months. Most people can remain active.  People experience and describe pain differently; not everyone is the same.  · The pain can be sharp, stabbing, shooting, aching, cramping or burning.  · Movement, standing, bending, lifting, sitting, or walking may worsen pain.  · It can be localized to one spot or area, or it can be more generalized.  · It can spread or radiate upwards, to the front, or go down your arms or legs (sciatica).  · It can cause muscle spasm.  Most of the time, mechanical problems with the muscles or spine cause the pain. Mechanical problems are usually caused by an injury to the muscles or ligaments. While illness can cause back pain, it is usually not caused by a serious illness. Mechanical problems include:   · Physical activity such as sports, exercise, work, or normal activity  · Overexertion, lifting, pushing, pulling incorrectly or too aggressively  · Sudden twisting, bending, or stretching from an accident, or accidental movement  · Poor posture  · Stretching or moving wrong, without noticing pain at the time  · Poor coordination, lack of regular exercise (check with your doctor about this)  · Spinal disc disease or arthritis  · Stress  Pain can also be related to pregnancy, or illness like appendicitis, bladder or kidney infections, pelvic infections, and many other things.  Acute back pain usually gets better in 1 to 2 weeks. Back pain related to disk disease, arthritis in the spinal joints or spinal stenosis (narrowing of the spinal canal) can become chronic and last for months or years.  Unless you had a physical injury (for example, a car accident or fall) X-rays are usually not needed for the initial  evaluation of back pain. If pain continues and does not respond to medical treatment, X-rays and other tests may be needed.  Home care  Try these home care recommendations:  · When in bed, try to find a position of comfort. A firm mattress is best. Try lying flat on your back with pillows under your knees. You can also try lying on your side with your knees bent up towards your chest and a pillow between your knees.  · At first, do not try to stretch out the sore spots. If there is a strain, it is not like the good soreness you get after exercising without an injury. In this case, stretching may make it worse.  · Avoid prolong sitting, long car rides, or travel. This puts more stress on the lower back than standing or walking.  · During the first 24 to 72 hours after an acute injury or flare up of chronic back pain, apply an ice pack to the painful area for 20 minutes and then remove it for 20 minutes. Do this over a period of 60 to 90 minutes or several times a day. This will reduce swelling and pain. Wrap the ice pack in a thin towel or plastic to protect your skin.  · You can start with ice, then switch to heat. Heat (hot shower, hot bath, or heating pad) reduces pain and works well for muscle spasms. Heat can be applied to the painful area for 20 minutes then remove it for 20 minutes. Do this over a period of 60 to 90 minutes or several times a day. Do not sleep on a heating pad. It can lead to skin burns or tissue damage.  · You can alternate ice and heat therapy. Talk with your doctor about the best treatment for your back pain.  · Therapeutic massage can help relax the back muscles without stretching them.  · Be aware of safe lifting methods and do not lift anything without stretching first.  Medicines  Talk to your doctor before using medicine, especially if you have other medical problems or are taking other medicines.  · You may use over-the-counter medicine as directed on the bottle to control pain,  unless another pain medicine was prescribed. If you have chronic conditions like diabetes, liver or kidney disease, stomach ulcers, or gastrointestinal bleeding, or are taking blood thinners, talk to your doctor before taking any medicine.  · Be careful if you are given a prescription medicines, narcotics, or medicine for muscle spasms. They can cause drowsiness, affect your coordination, reflexes, and judgement. Do not drive or operate heavy machinery.  Follow-up care  Follow up with your healthcare provider, or as advised.   A radiologist will review any X-rays that were taken. Your provide will notify you of any new findings that may affect your care.  Call 911  Call emergency services if any of the following occur:  · Trouble breathing  · Confusion  · Very drowsy or trouble awakening  · Fainting or loss of consciousness  · Rapid or very slow heart rate  · Loss of bowel or bladder control  When to seek medical advice  Call your healthcare provider right away if any of these occur:   · Pain becomes worse or spreads to your legs  · Weakness or numbness in one or both legs  · Numbness in the groin or genital area  Date Last Reviewed: 7/1/2016 © 2000-2017 [employer]. [address] [place] [address]. All rights reserved. This information is not intended as a substitute for professional medical care. Always follow your healthcare professional's instructions.        Bronchitis, Antibiotic Treatment (Adult)    Bronchitis is an infection of the air passages (bronchial tubes) in your lungs. It often occurs when you have a cold. This illness is contagious during the first few days and is spread through the air by coughing and sneezing, or by direct contact (touching the sick person and then touching your own eyes, nose, or mouth).  Symptoms of bronchitis include cough with mucus (phlegm) and low-grade fever. Bronchitis usually lasts 7 to 14 days. Mild cases can be treated with simple home remedies.  More severe infection is treated with an antibiotic.  Home care  Follow these guidelines when caring for yourself at home:  · If your symptoms are severe, rest at home for the first 2 to 3 days. When you go back to your usual activities, don't let yourself get too tired.  · Do not smoke. Also avoid being exposed to secondhand smoke.  · You may use over-the-counter medicines to control fever or pain, unless another medicine was prescribed. (Note: If you have chronic liver or kidney disease or have ever had a stomach ulcer or gastrointestinal bleeding, talk with your healthcare provider before using these medicines. Also talk to your provider if you are taking medicine to prevent blood clots.) Aspirin should never be given to anyone younger than 18 years of age who is ill with a viral infection or fever. It may cause severe liver or brain damage.  · Your appetite may be poor, so a light diet is fine. Avoid dehydration by drinking 6 to 8 glasses of fluids per day (such as water, soft drinks, sports drinks, juices, tea, or soup). Extra fluids will help loosen secretions in the nose and lungs.  · Over-the-counter cough, cold, and sore-throat medicines will not shorten the length of the illness, but they may be helpful to reduce symptoms. (Note: Do not use decongestants if you have high blood pressure.)  · Finish all antibiotic medicine. Do this even if you are feeling better after only a few days.  Follow-up care  Follow up with your healthcare provider, or as advised. If you had an X-ray or ECG (electrocardiogram), a specialist will review it. You will be notified of any new findings that may affect your care.  Note: If you are age 65 or older, or if you have a chronic lung disease or condition that affects your immune system, or you smoke, talk to your healthcare provider about having pneumococcal vaccinations and a yearly influenza vaccination (flu shot).  When to seek medical advice  Call your healthcare provider  right away if any of these occur:  · Fever of 100.4°F (38°C) or higher  · Coughing up increased amounts of colored sputum  · Weakness, drowsiness, headache, facial pain, ear pain, or a stiff neck  Call 911, or get immediate medical care  Contact emergency services right away if any of these occur.  · Coughing up blood  · Worsening weakness, drowsiness, headache, or stiff neck  · Trouble breathing, wheezing, or pain with breathing  Date Last Reviewed: 9/13/2015  © 0157-0437 [employer]. [address] [place] [id number]. All rights reserved. This information is not intended as a substitute for professional medical care. Always follow your healthcare professional's instructions.

## 2018-05-04 NOTE — PROGRESS NOTES
Hypoparathyroidism - post surgical  Serum calcium 9.4 mg/dl  Need a 24 hr urine for calcium/creatinine  msg sent  Orders placed

## 2018-05-07 ENCOUNTER — TELEPHONE (OUTPATIENT)
Dept: URGENT CARE | Facility: CLINIC | Age: 48
End: 2018-05-07

## 2018-05-29 ENCOUNTER — PATIENT MESSAGE (OUTPATIENT)
Dept: ENDOCRINOLOGY | Facility: CLINIC | Age: 48
End: 2018-05-29

## 2018-06-20 ENCOUNTER — OFFICE VISIT (OUTPATIENT)
Dept: RHEUMATOLOGY | Facility: CLINIC | Age: 48
End: 2018-06-20
Payer: COMMERCIAL

## 2018-06-20 ENCOUNTER — LAB VISIT (OUTPATIENT)
Dept: LAB | Facility: HOSPITAL | Age: 48
End: 2018-06-20
Attending: INTERNAL MEDICINE
Payer: COMMERCIAL

## 2018-06-20 VITALS
WEIGHT: 182 LBS | HEART RATE: 77 BPM | DIASTOLIC BLOOD PRESSURE: 74 MMHG | HEIGHT: 69 IN | SYSTOLIC BLOOD PRESSURE: 119 MMHG | BODY MASS INDEX: 26.96 KG/M2

## 2018-06-20 DIAGNOSIS — R76.8 RHEUMATOID FACTOR POSITIVE: Primary | ICD-10-CM

## 2018-06-20 DIAGNOSIS — R76.8 RHEUMATOID FACTOR POSITIVE: ICD-10-CM

## 2018-06-20 DIAGNOSIS — M25.649 STIFFNESS OF HAND JOINT, UNSPECIFIED LATERALITY: ICD-10-CM

## 2018-06-20 DIAGNOSIS — R53.83 FATIGUE, UNSPECIFIED TYPE: ICD-10-CM

## 2018-06-20 LAB
ALBUMIN SERPL BCP-MCNC: 4.3 G/DL
ALP SERPL-CCNC: 75 U/L
ALT SERPL W/O P-5'-P-CCNC: 19 U/L
ANION GAP SERPL CALC-SCNC: 9 MMOL/L
AST SERPL-CCNC: 21 U/L
BASOPHILS # BLD AUTO: 0.05 K/UL
BASOPHILS NFR BLD: 0.5 %
BILIRUB SERPL-MCNC: 0.9 MG/DL
BUN SERPL-MCNC: 12 MG/DL
CALCIUM SERPL-MCNC: 9.4 MG/DL
CHLORIDE SERPL-SCNC: 101 MMOL/L
CO2 SERPL-SCNC: 28 MMOL/L
CREAT SERPL-MCNC: 0.9 MG/DL
CRP SERPL-MCNC: 3 MG/L
DIFFERENTIAL METHOD: ABNORMAL
EOSINOPHIL # BLD AUTO: 0.1 K/UL
EOSINOPHIL NFR BLD: 0.6 %
ERYTHROCYTE [DISTWIDTH] IN BLOOD BY AUTOMATED COUNT: 12.1 %
ERYTHROCYTE [SEDIMENTATION RATE] IN BLOOD BY WESTERGREN METHOD: 15 MM/HR
EST. GFR  (AFRICAN AMERICAN): >60 ML/MIN/1.73 M^2
EST. GFR  (NON AFRICAN AMERICAN): >60 ML/MIN/1.73 M^2
GLUCOSE SERPL-MCNC: 95 MG/DL
HCT VFR BLD AUTO: 43.3 %
HGB BLD-MCNC: 14.5 G/DL
IMM GRANULOCYTES # BLD AUTO: 0.05 K/UL
IMM GRANULOCYTES NFR BLD AUTO: 0.5 %
LYMPHOCYTES # BLD AUTO: 2.3 K/UL
LYMPHOCYTES NFR BLD: 22.9 %
MCH RBC QN AUTO: 30 PG
MCHC RBC AUTO-ENTMCNC: 33.5 G/DL
MCV RBC AUTO: 90 FL
MONOCYTES # BLD AUTO: 0.6 K/UL
MONOCYTES NFR BLD: 5.6 %
NEUTROPHILS # BLD AUTO: 7.1 K/UL
NEUTROPHILS NFR BLD: 69.9 %
NRBC BLD-RTO: 0 /100 WBC
PLATELET # BLD AUTO: 303 K/UL
PMV BLD AUTO: 9.4 FL
POTASSIUM SERPL-SCNC: 4.2 MMOL/L
PROT SERPL-MCNC: 8.4 G/DL
RBC # BLD AUTO: 4.84 M/UL
SODIUM SERPL-SCNC: 138 MMOL/L
WBC # BLD AUTO: 10.19 K/UL

## 2018-06-20 PROCEDURE — 99999 PR PBB SHADOW E&M-EST. PATIENT-LVL III: CPT | Mod: PBBFAC,,, | Performed by: INTERNAL MEDICINE

## 2018-06-20 PROCEDURE — 80053 COMPREHEN METABOLIC PANEL: CPT

## 2018-06-20 PROCEDURE — 36415 COLL VENOUS BLD VENIPUNCTURE: CPT

## 2018-06-20 PROCEDURE — 85025 COMPLETE CBC W/AUTO DIFF WBC: CPT

## 2018-06-20 PROCEDURE — 85651 RBC SED RATE NONAUTOMATED: CPT

## 2018-06-20 PROCEDURE — 99214 OFFICE O/P EST MOD 30 MIN: CPT | Mod: S$GLB,,, | Performed by: INTERNAL MEDICINE

## 2018-06-20 PROCEDURE — 86140 C-REACTIVE PROTEIN: CPT

## 2018-06-20 PROCEDURE — 3008F BODY MASS INDEX DOCD: CPT | Mod: CPTII,S$GLB,, | Performed by: INTERNAL MEDICINE

## 2018-06-20 NOTE — PROGRESS NOTES
"Subjective:       Patient ID: Kristina Graham is a 47 y.o. female.    Chief Complaint: Follow-up      HPI:  Kristina Graham is a 47 y.o. female with  history of positive RF and joint pain.  No treatment given. Returns due to joint pains. Pain bilateral thumbs and ankles.  Pain 3/10 ache. Improves on its own and worsens with movement regarding hands.  Ankles worsen at end of day (wears heels) and improves with elevation.   Morning stiffness for 1 hour. Several months of fatigue.  Difficulty falling asleep.      Left peroneal nerve compression on EMG for left toe numbness in 2011 patient declined surgery.     History of B12 deficiency, vitamin D deficiency and acne treated with spironolactone.  In 2010 Parvovirus and EBV infections. IgM positive for both.             Hospitalized last month due to hypercalcemia after parathyroid knicked after total thyroidectomy 2/2017.  Denies any pain.     Review of Systems   Constitutional: Negative for fever and unexpected weight change.   HENT: Negative for trouble swallowing.    Eyes: Negative for redness.   Respiratory: Positive for cough. Negative for shortness of breath.    Cardiovascular: Negative for chest pain.   Gastrointestinal: Negative for constipation and diarrhea.   Genitourinary: Negative for dysuria and genital sores.   Skin: Negative for rash.   Neurological: Negative for headaches.   Hematological: Does not bruise/bleed easily.   Psychiatric/Behavioral: Negative.          Objective:   /74   Pulse 77   Ht 5' 9" (1.753 m)   Wt 82.6 kg (182 lb)   LMP 06/08/2018 (Exact Date)   BMI 26.88 kg/m²      Physical Exam   Constitutional: She is oriented to person, place, and time and well-developed, well-nourished, and in no distress.   HENT:   Head: Normocephalic and atraumatic.   Eyes: Conjunctivae and EOM are normal.   Neck: Neck supple.   Cardiovascular: Normal rate, regular rhythm and normal heart sounds.    Pulmonary/Chest: Effort normal and breath " sounds normal.   Abdominal: Soft. Bowel sounds are normal.   Neurological: She is alert and oriented to person, place, and time. Gait normal.   Skin: Skin is warm and dry.     Psychiatric: Mood and affect normal.   Musculoskeletal:   28 joint count: 0 tender and 0 swollen           LABS    Component      Latest Ref Rng & Units 5/3/2018 4/4/2018   Glucose      70 - 110 mg/dL 101    Sodium      136 - 145 mmol/L 138    Potassium      3.5 - 5.1 mmol/L 4.4    Chloride      95 - 110 mmol/L 101    CO2      23 - 29 mmol/L 28    BUN, Bld      6 - 20 mg/dL 12    Calcium      8.7 - 10.5 mg/dL 9.4    Creatinine      0.5 - 1.4 mg/dL 0.8    Albumin      3.5 - 5.2 g/dL 4.0    Phosphorus      2.7 - 4.5 mg/dL 4.0 4.2   eGFR if African American      >60 mL/min/1.73 m:2 >60.0    eGFR if non African American      >60 mL/min/1.73 m:2 >60.0    Anion Gap      8 - 16 mmol/L 9    TSH      0.400 - 4.000 uIU/mL 0.046 (L) 18.028 (H)   PTH      9.0 - 77.0 pg/mL <5.0 (L) <5.0 (L)   Magnesium      1.6 - 2.6 mg/dL 2.0 2.1   Vit D, 25-Hydroxy      30 - 96 ng/mL 50    Free T4      0.71 - 1.51 ng/dL 1.20         Assessment:       1. Joint pain/swelling/stiffness.  Currently stable.  May be influenced by hypocalcemia  2. S/p Thyroidectomy complicated by hypoparathyroidism.  3. Positive rheumatoid factor  4. Fatigue   5. Overweight.   6. Vitamin D deficiency.  Mildly low.  Currently on OTC 2,000 units daily  7. History vitamin B12 deficiency    Plan:       1. Labs  2. Continue Lodine  3. RTO 4 months/prn

## 2018-06-21 ENCOUNTER — PATIENT MESSAGE (OUTPATIENT)
Dept: RHEUMATOLOGY | Facility: CLINIC | Age: 48
End: 2018-06-21

## 2018-06-26 ENCOUNTER — OFFICE VISIT (OUTPATIENT)
Dept: ENDOCRINOLOGY | Facility: CLINIC | Age: 48
End: 2018-06-26
Payer: COMMERCIAL

## 2018-06-26 VITALS
HEIGHT: 69 IN | DIASTOLIC BLOOD PRESSURE: 72 MMHG | RESPIRATION RATE: 17 BRPM | WEIGHT: 183.88 LBS | BODY MASS INDEX: 27.24 KG/M2 | SYSTOLIC BLOOD PRESSURE: 100 MMHG | HEART RATE: 94 BPM

## 2018-06-26 DIAGNOSIS — E89.0 S/P THYROIDECTOMY: ICD-10-CM

## 2018-06-26 DIAGNOSIS — E05.80 THYROTOXICOSIS, EXOGENOUS IATROGENIC: ICD-10-CM

## 2018-06-26 DIAGNOSIS — E89.2 HYPOPARATHYROIDISM AFTER PROCEDURE: Primary | ICD-10-CM

## 2018-06-26 PROCEDURE — 3008F BODY MASS INDEX DOCD: CPT | Mod: CPTII,S$GLB,, | Performed by: INTERNAL MEDICINE

## 2018-06-26 PROCEDURE — 99999 PR PBB SHADOW E&M-EST. PATIENT-LVL III: CPT | Mod: PBBFAC,,, | Performed by: INTERNAL MEDICINE

## 2018-06-26 PROCEDURE — 99214 OFFICE O/P EST MOD 30 MIN: CPT | Mod: S$GLB,,, | Performed by: INTERNAL MEDICINE

## 2018-06-26 RX ORDER — CALCITRIOL 0.25 UG/1
0.5 CAPSULE ORAL DAILY
Qty: 60 CAPSULE | Refills: 4 | Status: SHIPPED | OUTPATIENT
Start: 2018-06-26 | End: 2019-02-07 | Stop reason: SDUPTHER

## 2018-06-26 NOTE — PROGRESS NOTES
Subjective:     Patient ID: Kristina Graham is a 47 y.o. female.    Chief Complaint: Follow-up    HPI:   Ms. Graham is a 47 y.o. female who is here for a follow-up visit for evaluation of hypercalcemia and hypothyroidism following thyroidectomy for a benign thyroid nodule (4 CM) one year ago. 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     Today, she feels better.      Post surgical hypothyroidism:  See initial dose for details  Nature - throid 65 mg one and half pills in the mornings and one in the afternoon  Takes it first thing in the morning with water and waits to eat thirty minutes later.      Post surgical hypocalcemia:  Reports hand cramps that does not resolve with calcium intake that occurred recently. Denies recent perioral numbness/tingling but when it has occurred it resolves with chewable calcium   Reports feeling numbness - that responds to chewable calcium; this occurs two to three times a month.      Current regimen:  Calcitriol .25 mcg one capsule twice a day  MVI  Chewable calcium 500 mg 1 tablet daily   Magnesium 400 mg one tablet at bedtime     Dietary:   Cheese a few times a week  Dark leafy greens - a few times a week  No milk or yogurt     Review of Systems   Constitutional: Negative for chills and fever.   HENT: Negative for congestion and sinus pressure.    Eyes: Negative for visual disturbance.   Respiratory: Negative for chest tightness and shortness of breath.    Cardiovascular: Negative for chest pain, palpitations and leg swelling.   Gastrointestinal: Negative for abdominal pain and vomiting.   Genitourinary: Negative for dysuria.   Musculoskeletal: Negative for arthralgias.   Skin: Negative for rash.   Neurological: Negative for weakness.   Hematological: Does not bruise/bleed easily.   Psychiatric/Behavioral: Negative for sleep disturbance.        Objective:     Physical Exam    Vitals:    06/26/18 1317   BP: 100/72   Pulse: 94   Resp: 17   Weight: 83.4  "kg (183 lb 13.8 oz)   Height: 5' 9" (1.753 m)     Results for DOMITILA ALBERTO (MRN [id number]) as of 6/26/2018 13:32   Ref. Range 6/20/2018 11:55   Sodium Latest Ref Range: 136 - 145 mmol/L 138   Potassium Latest Ref Range: 3.5 - 5.1 mmol/L 4.2   Chloride Latest Ref Range: 95 - 110 mmol/L 101   CO2 Latest Ref Range: 23 - 29 mmol/L 28   Anion Gap Latest Ref Range: 8 - 16 mmol/L 9   BUN, Bld Latest Ref Range: 6 - 20 mg/dL 12   Creatinine Latest Ref Range: 0.5 - 1.4 mg/dL 0.9   eGFR if non African American Latest Ref Range: >60 mL/min/1.73 m^2 >60.0   eGFR if African American Latest Ref Range: >60 mL/min/1.73 m^2 >60.0   Glucose Latest Ref Range: 70 - 110 mg/dL 95   Calcium Latest Ref Range: 8.7 - 10.5 mg/dL 9.4   Alkaline Phosphatase Latest Ref Range: 55 - 135 U/L 75   Total Protein Latest Ref Range: 6.0 - 8.4 g/dL 8.4   Albumin Latest Ref Range: 3.5 - 5.2 g/dL 4.3   Total Bilirubin Latest Ref Range: 0.1 - 1.0 mg/dL 0.9   AST Latest Ref Range: 10 - 40 U/L 21   ALT Latest Ref Range: 10 - 44 U/L 19   Results for DOMITILA ALBERTO (MRN [id number]) as of 6/26/2018 13:32   Ref. Range 5/3/2018 11:45   TSH Latest Ref Range: 0.400 - 4.000 uIU/mL 0.046 (L)   Free T4 Latest Ref Range: 0.71 - 1.51 ng/dL 1.20   PTH Latest Ref Range: 9.0 - 77.0 pg/mL <5.0 (L)   Results for DOMITILA ALBERTO (MRN [id number]) as of 6/26/2018 13:35   Ref. Range 5/3/2018 11:45   Vit D, 25-Hydroxy Latest Ref Range: 30 - 96 ng/mL 50     Assessment/Plan:     Hypothyroid and hypoparathyroidism    Iatrogenic thyrotoxicosis --  Make small changes due to multiple symptoms.   1) armour thyroid 65 mg one tablet twice a day  2) repeat TSH in three months.     Hypocalcemia  1) continue calcitriol .25 mcg cap one tablet twice daily   2) calcium is 9.4 mg/dl -- still a little higher than goal: may  3) responds well to chewable calcium, MVI and calcitriol. She is vitamin D replete.   4) goal is 8.5mg/dl may need HCTZ  5) needs 24 hr urine calc/creat    "

## 2018-08-02 ENCOUNTER — OFFICE VISIT (OUTPATIENT)
Dept: OPTOMETRY | Facility: CLINIC | Age: 48
End: 2018-08-02
Payer: COMMERCIAL

## 2018-08-02 DIAGNOSIS — H52.11 MYOPIA WITH ASTIGMATISM AND PRESBYOPIA, RIGHT: ICD-10-CM

## 2018-08-02 DIAGNOSIS — H52.4 MYOPIA WITH ASTIGMATISM AND PRESBYOPIA, RIGHT: ICD-10-CM

## 2018-08-02 DIAGNOSIS — H16.223 KERATOCONJUNCTIVITIS SICCA, NOT SPECIFIED AS SJÖGREN'S, BILATERAL: ICD-10-CM

## 2018-08-02 DIAGNOSIS — Z01.00 EXAMINATION OF EYES AND VISION: Primary | ICD-10-CM

## 2018-08-02 DIAGNOSIS — H52.201 MYOPIA WITH ASTIGMATISM AND PRESBYOPIA, RIGHT: ICD-10-CM

## 2018-08-02 PROCEDURE — 92014 COMPRE OPH EXAM EST PT 1/>: CPT | Mod: S$GLB,,, | Performed by: OPTOMETRIST

## 2018-08-02 PROCEDURE — 92015 DETERMINE REFRACTIVE STATE: CPT | Mod: S$GLB,,, | Performed by: OPTOMETRIST

## 2018-08-02 PROCEDURE — 99999 PR PBB SHADOW E&M-EST. PATIENT-LVL II: CPT | Mod: PBBFAC,,, | Performed by: OPTOMETRIST

## 2018-08-02 NOTE — PROGRESS NOTES
[facility]     Ms. Kristina Graham for annual eye exam  Patient complains of mild dryness pt uses OTC eye drops PRN    Would patient like a refraction today? declines    (-)drops  (-)flashes  (-)floaters  (-)diplopia    Diabetic -  Hemoglobin A1C       Date                     Value               Ref Range             Status                06/18/2013               5.0                 4.0 - 6.2 %           Final            ----------    OCULAR HISTORY  Last Eye Exam 2017 with Olivia  (-)eye surgery   (-)diagnosed or treated for any eye conditions or diseases --    FAMILY HISTORY  (-)Glaucoma -        Last edited by Keenan Garcia PCT on 8/2/2018 10:43 AM. (History)            Assessment /Plan     For exam results, see Encounter Report.    Examination of eyes and vision  -annual DFE    Keratoconjunctivitis sicca, not specified as Sjögren's, bilateral  -Optive BID+  -reviewed Xiidra, pt wishes to try increased OTC first    Myopia with astigmatism and presbyopia, right  Eyeglass Final Rx     Eyeglass Final Rx       Sphere Cylinder Axis Dist VA Add    Right +0.75 +0.75 090 20/20 +1.50    Left [place] Sphere  20/20 +1.50    Type:  PAL    Expiration Date:  8/3/2019                  RTC 1 yr

## 2018-10-08 ENCOUNTER — OFFICE VISIT (OUTPATIENT)
Dept: URGENT CARE | Facility: CLINIC | Age: 48
End: 2018-10-08
Payer: COMMERCIAL

## 2018-10-08 VITALS
HEIGHT: 69 IN | OXYGEN SATURATION: 98 % | BODY MASS INDEX: 26.66 KG/M2 | RESPIRATION RATE: 16 BRPM | WEIGHT: 180 LBS | HEART RATE: 97 BPM | DIASTOLIC BLOOD PRESSURE: 79 MMHG | SYSTOLIC BLOOD PRESSURE: 138 MMHG | TEMPERATURE: 97 F

## 2018-10-08 DIAGNOSIS — J32.9 SINUSITIS, UNSPECIFIED CHRONICITY, UNSPECIFIED LOCATION: Primary | ICD-10-CM

## 2018-10-08 PROCEDURE — 99214 OFFICE O/P EST MOD 30 MIN: CPT | Mod: 25,S$GLB,, | Performed by: NURSE PRACTITIONER

## 2018-10-08 PROCEDURE — 96372 THER/PROPH/DIAG INJ SC/IM: CPT | Mod: S$GLB,,, | Performed by: NURSE PRACTITIONER

## 2018-10-08 RX ORDER — AZITHROMYCIN 250 MG/1
TABLET, FILM COATED ORAL
Qty: 6 TABLET | Refills: 0 | Status: SHIPPED | OUTPATIENT
Start: 2018-10-08 | End: 2019-01-07 | Stop reason: ALTCHOICE

## 2018-10-08 RX ORDER — DEXAMETHASONE SODIUM PHOSPHATE 100 MG/10ML
6 INJECTION INTRAMUSCULAR; INTRAVENOUS ONCE
Status: COMPLETED | OUTPATIENT
Start: 2018-10-08 | End: 2018-10-08

## 2018-10-08 RX ADMIN — DEXAMETHASONE SODIUM PHOSPHATE 6 MG: 100 INJECTION INTRAMUSCULAR; INTRAVENOUS at 06:10

## 2018-10-08 NOTE — PROGRESS NOTES
"Subjective:       Patient ID: Kristina Graham is a 47 y.o. female.    Vitals:  height is 5' 9" (1.753 m) and weight is 81.6 kg (180 lb). Her oral temperature is 97.2 °F (36.2 °C). Her blood pressure is 138/79 and her pulse is 97. Her respiration is 16 and oxygen saturation is 98%.     Chief Complaint: Sinus Problem    Patient states she has been having sinus pressure for three days and a headache where her head feels full.       Sinus Problem   This is a new problem. The current episode started in the past 7 days. The problem is unchanged. There has been no fever. The fever has been present for less than 1 day. Her pain is at a severity of 8/10. The pain is moderate. Associated symptoms include congestion, coughing and headaches. Pertinent negatives include no chills, ear pain, hoarse voice, shortness of breath or sore throat. Past treatments include spray decongestants (mucinex). The treatment provided no relief.     Review of Systems   Constitution: Negative for chills, fever and malaise/fatigue.   HENT: Positive for congestion. Negative for ear pain, hoarse voice and sore throat.    Eyes: Negative for discharge and redness.   Cardiovascular: Negative for chest pain, dyspnea on exertion and leg swelling.   Respiratory: Positive for cough. Negative for shortness of breath, sputum production and wheezing.    Musculoskeletal: Negative for myalgias.   Gastrointestinal: Negative for abdominal pain and nausea.   Neurological: Positive for headaches.       Objective:      Physical Exam   Constitutional: She is oriented to person, place, and time. She appears well-developed and well-nourished. She is cooperative.  Non-toxic appearance. She does not appear ill. No distress.   HENT:   Head: Normocephalic and atraumatic.   Right Ear: Hearing, tympanic membrane, external ear and ear canal normal.   Left Ear: Hearing, tympanic membrane, external ear and ear canal normal.   Nose: Mucosal edema and rhinorrhea present. No " nasal deformity. No epistaxis. Right sinus exhibits no maxillary sinus tenderness and no frontal sinus tenderness. Left sinus exhibits no maxillary sinus tenderness and no frontal sinus tenderness.   Mouth/Throat: Uvula is midline, oropharynx is clear and moist and mucous membranes are normal. No trismus in the jaw. Normal dentition. No uvula swelling. No oropharyngeal exudate, posterior oropharyngeal edema or posterior oropharyngeal erythema.   Eyes: Conjunctivae and lids are normal. No scleral icterus.   Sclera clear bilat   Neck: Trachea normal, full passive range of motion without pain and phonation normal. Neck supple.   Cardiovascular: Normal rate, regular rhythm, normal heart sounds, intact distal pulses and normal pulses.   Pulmonary/Chest: Effort normal and breath sounds normal. No respiratory distress.   Abdominal: Soft. Normal appearance and bowel sounds are normal. She exhibits no distension. There is no tenderness.   Musculoskeletal: Normal range of motion. She exhibits no edema or deformity.   Neurological: She is alert and oriented to person, place, and time. She exhibits normal muscle tone. Coordination normal.   Skin: Skin is warm, dry and intact. She is not diaphoretic. No pallor.   Psychiatric: She has a normal mood and affect. Her speech is normal and behavior is normal. Judgment and thought content normal. Cognition and memory are normal.   Nursing note and vitals reviewed.      Assessment:       1. Sinusitis, unspecified chronicity, unspecified location        Plan:         Sinusitis, unspecified chronicity, unspecified location  -     azithromycin (ZITHROMAX Z-GIORGI) 250 MG tablet; Take 2 tablets (500 mg) on  Day 1,  followed by 1 tablet (250 mg) once daily on Days 2 through 5.  Dispense: 6 tablet; Refill: 0  -     dexamethasone injection 6 mg; Inject 0.6 mLs (6 mg total) into the muscle once.      Patient Instructions   Please follow up with your Primary care provider within 2-5 days if your  "signs and symptoms have not resolved or worsen.  The usual course of cold symptoms are 10-14 days.     If your condition worsens or fails to improve we recommend that you receive another evaluation at the emergency room immediately or contact your primary medical clinic to discuss your concerns.     You must understand that you have received an Urgent Care treatment only and that you may be released before all of your medical problems are known or treated.   You, the patient, will arrange for follow up care as instructed.     Tylenol or Ibuprofen can also be used as directed for pain/fever unless you have an allergy to them or medical condition such as stomach ulcers, kidney or liver disease or blood thinners etc for which you should not be taking these type of medications.     Take over the counter cough medication as directed as needed for cough.  You should avoid medications with pseudoephedrine or phenylephrine (any medication with "D") if you have high blood pressure as this can cause an elevation in your blood pressure. Instead consider Corcidin HBP as needed to prevent an elevated blood pressure.     Natural remedies of symptoms (as needed) include humidification, saline nasal sprays, and/or steamy showers.  Increase fluids, warm tea with honey, cough drops as needed.  You may also use salt water gargles for sore throat.    IF you received a steroid shot today - As discussed, this can elevate your blood pressure, elevate your blood sugar, water weight gain, nervous energy, redness to the face and dimpling of the skin at the injection site.     You have been given an antibiotic to treat your condition today.  Please complete the antibiotic as directed on the bottle.     As with any antibiotics, use probiotics and/or high culture yogurt about 2 hours apart from the antibiotic and about 1 week after the antibiotic to replace the gut brant lost with antibiotic use.      If you are female and on BCP use additional " methods to prevent pregnancy while on antibiotics and for one cycle after.         Sinusitis (Antibiotic Treatment)    The sinuses are air-filled spaces within the bones of the face. They connect to the inside of the nose. Sinusitis is an inflammation of the tissue lining the sinus cavity. Sinus inflammation can occur during a cold. It can also be due to allergies to pollens and other particles in the air. Sinusitis can cause symptoms of sinus congestion and fullness. A sinus infection causes fever, headache and facial pain. There is often green or yellow drainage from the nose or into the back of the throat (post-nasal drip). You have been given antibiotics to treat this condition.  Home care:  · Take the full course of antibiotics as instructed. Do not stop taking them, even if you feel better.  · Drink plenty of water, hot tea, and other liquids. This may help thin mucus. It also may promote sinus drainage.  · Heat may help soothe painful areas of the face. Use a towel soaked in hot water. Or,  the shower and direct the hot spray onto your face. Using a vaporizer along with a menthol rub at night may also help.   · An expectorant containing guaifenesin may help thin the mucus and promote drainage from the sinuses.  · Over-the-counter decongestants may be used unless a similar medicine was prescribed. Nasal sprays work the fastest. Use one that contains phenylephrine or oxymetazoline. First blow the nose gently. Then use the spray. Do not use these medicines more often than directed on the label or symptoms may get worse. You may also use tablets containing pseudoephedrine. Avoid products that combine ingredients, because side effects may be increased. Read labels. You can also ask the pharmacist for help. (NOTE: Persons with high blood pressure should not use decongestants. They can raise blood pressure.)  · Over-the-counter antihistamines may help if allergies contributed to your sinusitis.    · Do not  use nasal rinses or irrigation during an acute sinus infection, unless told to by your health care provider. Rinsing may spread the infection to other sinuses.  · Use acetaminophen or ibuprofen to control pain, unless another pain medicine was prescribed. (If you have chronic liver or kidney disease or ever had a stomach ulcer, talk with your doctor before using these medicines. Aspirin should never be used in anyone under 18 years of age who is ill with a fever. It may cause severe liver damage.)  · Don't smoke. This can worsen symptoms.  Follow-up care  Follow up with your healthcare provider or our staff if you are not improving within the next week.  When to seek medical advice  Call your healthcare provider if any of these occur:  · Facial pain or headache becoming more severe  · Stiff neck  · Unusual drowsiness or confusion  · Swelling of the forehead or eyelids  · Vision problems, including blurred or double vision  · Fever of 100.4ºF (38ºC) or higher, or as directed by your healthcare provider  · Seizure  · Breathing problems  · Symptoms not resolving within 10 days  Date Last Reviewed: 4/13/2015 © 2000-2017 [employer]. [address] [place] [address], [place] [address]. All rights reserved. This information is not intended as a substitute for professional medical care. Always follow your healthcare professional's instructions.

## 2018-10-08 NOTE — PATIENT INSTRUCTIONS
"Please follow up with your Primary care provider within 2-5 days if your signs and symptoms have not resolved or worsen.  The usual course of cold symptoms are 10-14 days.     If your condition worsens or fails to improve we recommend that you receive another evaluation at the emergency room immediately or contact your primary medical clinic to discuss your concerns.     You must understand that you have received an Urgent Care treatment only and that you may be released before all of your medical problems are known or treated.   You, the patient, will arrange for follow up care as instructed.     Tylenol or Ibuprofen can also be used as directed for pain/fever unless you have an allergy to them or medical condition such as stomach ulcers, kidney or liver disease or blood thinners etc for which you should not be taking these type of medications.     Take over the counter cough medication as directed as needed for cough.  You should avoid medications with pseudoephedrine or phenylephrine (any medication with "D") if you have high blood pressure as this can cause an elevation in your blood pressure. Instead consider Corcidin HBP as needed to prevent an elevated blood pressure.     Natural remedies of symptoms (as needed) include humidification, saline nasal sprays, and/or steamy showers.  Increase fluids, warm tea with honey, cough drops as needed.  You may also use salt water gargles for sore throat.    IF you received a steroid shot today - As discussed, this can elevate your blood pressure, elevate your blood sugar, water weight gain, nervous energy, redness to the face and dimpling of the skin at the injection site.     You have been given an antibiotic to treat your condition today.  Please complete the antibiotic as directed on the bottle.     As with any antibiotics, use probiotics and/or high culture yogurt about 2 hours apart from the antibiotic and about 1 week after the antibiotic to replace the gut brant " lost with antibiotic use.      If you are female and on BCP use additional methods to prevent pregnancy while on antibiotics and for one cycle after.         Sinusitis (Antibiotic Treatment)    The sinuses are air-filled spaces within the bones of the face. They connect to the inside of the nose. Sinusitis is an inflammation of the tissue lining the sinus cavity. Sinus inflammation can occur during a cold. It can also be due to allergies to pollens and other particles in the air. Sinusitis can cause symptoms of sinus congestion and fullness. A sinus infection causes fever, headache and facial pain. There is often green or yellow drainage from the nose or into the back of the throat (post-nasal drip). You have been given antibiotics to treat this condition.  Home care:  · Take the full course of antibiotics as instructed. Do not stop taking them, even if you feel better.  · Drink plenty of water, hot tea, and other liquids. This may help thin mucus. It also may promote sinus drainage.  · Heat may help soothe painful areas of the face. Use a towel soaked in hot water. Or,  the shower and direct the hot spray onto your face. Using a vaporizer along with a menthol rub at night may also help.   · An expectorant containing guaifenesin may help thin the mucus and promote drainage from the sinuses.  · Over-the-counter decongestants may be used unless a similar medicine was prescribed. Nasal sprays work the fastest. Use one that contains phenylephrine or oxymetazoline. First blow the nose gently. Then use the spray. Do not use these medicines more often than directed on the label or symptoms may get worse. You may also use tablets containing pseudoephedrine. Avoid products that combine ingredients, because side effects may be increased. Read labels. You can also ask the pharmacist for help. (NOTE: Persons with high blood pressure should not use decongestants. They can raise blood  pressure.)  · Over-the-counter antihistamines may help if allergies contributed to your sinusitis.    · Do not use nasal rinses or irrigation during an acute sinus infection, unless told to by your health care provider. Rinsing may spread the infection to other sinuses.  · Use acetaminophen or ibuprofen to control pain, unless another pain medicine was prescribed. (If you have chronic liver or kidney disease or ever had a stomach ulcer, talk with your doctor before using these medicines. Aspirin should never be used in anyone under 18 years of age who is ill with a fever. It may cause severe liver damage.)  · Don't smoke. This can worsen symptoms.  Follow-up care  Follow up with your healthcare provider or our staff if you are not improving within the next week.  When to seek medical advice  Call your healthcare provider if any of these occur:  · Facial pain or headache becoming more severe  · Stiff neck  · Unusual drowsiness or confusion  · Swelling of the forehead or eyelids  · Vision problems, including blurred or double vision  · Fever of 100.4ºF (38ºC) or higher, or as directed by your healthcare provider  · Seizure  · Breathing problems  · Symptoms not resolving within 10 days  Date Last Reviewed: 4/13/2015  © 0841-6973 [employer]. [address] [place] [address], [place] [address]. All rights reserved. This information is not intended as a substitute for professional medical care. Always follow your healthcare professional's instructions.

## 2018-10-11 ENCOUNTER — TELEPHONE (OUTPATIENT)
Dept: URGENT CARE | Facility: CLINIC | Age: 48
End: 2018-10-11

## 2019-01-07 ENCOUNTER — OFFICE VISIT (OUTPATIENT)
Dept: RHEUMATOLOGY | Facility: CLINIC | Age: 49
End: 2019-01-07

## 2019-01-07 VITALS
DIASTOLIC BLOOD PRESSURE: 76 MMHG | HEIGHT: 69 IN | BODY MASS INDEX: 29.33 KG/M2 | WEIGHT: 198 LBS | SYSTOLIC BLOOD PRESSURE: 112 MMHG | HEART RATE: 89 BPM

## 2019-01-07 DIAGNOSIS — R76.8 RHEUMATOID FACTOR POSITIVE: Primary | ICD-10-CM

## 2019-01-07 DIAGNOSIS — E55.9 VITAMIN D DEFICIENCY: ICD-10-CM

## 2019-01-07 DIAGNOSIS — R53.83 FATIGUE, UNSPECIFIED TYPE: ICD-10-CM

## 2019-01-07 DIAGNOSIS — M25.649 STIFFNESS OF HAND JOINT, UNSPECIFIED LATERALITY: ICD-10-CM

## 2019-01-07 PROCEDURE — 99214 OFFICE O/P EST MOD 30 MIN: CPT | Mod: S$PBB,,, | Performed by: INTERNAL MEDICINE

## 2019-01-07 PROCEDURE — 99999 PR PBB SHADOW E&M-EST. PATIENT-LVL III: CPT | Mod: PBBFAC,,, | Performed by: INTERNAL MEDICINE

## 2019-01-07 PROCEDURE — 99214 PR OFFICE/OUTPT VISIT, EST, LEVL IV, 30-39 MIN: ICD-10-PCS | Mod: S$PBB,,, | Performed by: INTERNAL MEDICINE

## 2019-01-07 PROCEDURE — 99999 PR PBB SHADOW E&M-EST. PATIENT-LVL III: ICD-10-PCS | Mod: PBBFAC,,, | Performed by: INTERNAL MEDICINE

## 2019-01-07 NOTE — PROGRESS NOTES
"Subjective:       Patient ID: Kristina Graham is a 48 y.o. female.    Chief Complaint: Hand stiffness    HPI:  Kristina Graham is a 48 y.o. female with  history of positive RF and joint pain.  No treatment given. Hands ache intermittently but not as bad.  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      Pain 1/10 ache in hands. Improves with rest and worsens with movement regarding hands.  Only took Lodine when she went to [place].   Morning stiffness for 30-40 minutes. Several months of fatigue.  Difficulty falling asleep still.      Left peroneal nerve compression on EMG for left toe numbness in 2011 patient declined surgery.     History of B12 deficiency, vitamin D deficiency and acne treated with spironolactone.  In 2010 Parvovirus and EBV infections. IgM positive for both.             Hospitalized due to hypercalcemia after parathyroid knicked after total thyroidectomy 2/2017.  Denies any pain.     Review of Systems   Constitutional: Negative for fever and unexpected weight change.   HENT: Negative for trouble swallowing.    Eyes: Negative for redness.   Respiratory: Negative for cough and shortness of breath.    Cardiovascular: Negative for chest pain.   Gastrointestinal: Negative for constipation and diarrhea.   Genitourinary: Negative for dysuria and genital sores.   Musculoskeletal: Positive for arthralgias.   Skin: Negative for rash.   Neurological: Negative for headaches.   Hematological: Does not bruise/bleed easily.   Psychiatric/Behavioral: Negative.          Objective:   /76   Pulse 89   Ht 5' 9" (1.753 m)   Wt 89.8 kg (197 lb 15.6 oz)   BMI 29.24 kg/m²      Physical Exam   Constitutional: She is oriented to person, place, and time and well-developed, well-nourished, and in no distress.   HENT:   Head: Normocephalic and atraumatic.   Eyes: Conjunctivae and EOM are normal.   Neck: Neck supple.   Cardiovascular: Normal rate, " regular rhythm and normal heart sounds.    Pulmonary/Chest: Effort normal and breath sounds normal.   Abdominal: Soft. Bowel sounds are normal.   Neurological: She is alert and oriented to person, place, and time. Gait normal.   Skin: Skin is warm and dry.     Psychiatric: Mood and affect normal.   Musculoskeletal:   28 joint count: 2 tender (left 3rd MCP and right 3rd PIP) and 0 swollen           LABS    Component      Latest Ref Rng & Units 6/20/2018   WBC      3.90 - 12.70 K/uL 10.19   RBC      4.00 - 5.40 M/uL 4.84   Hemoglobin      12.0 - 16.0 g/dL 14.5   Hematocrit      37.0 - 48.5 % 43.3   MCV      82 - 98 fL 90   MCH      27.0 - 31.0 pg 30.0   MCHC      32.0 - 36.0 g/dL 33.5   RDW      11.5 - 14.5 % 12.1   Platelets      150 - 350 K/uL 303   MPV      9.2 - 12.9 fL 9.4   Immature Granulocytes      0.0 - 0.5 % 0.5   Gran # (ANC)      1.8 - 7.7 K/uL 7.1   Immature Grans (Abs)      0.00 - 0.04 K/uL 0.05 (H)   Lymph #      1.0 - 4.8 K/uL 2.3   Mono #      0.3 - 1.0 K/uL 0.6   Eos #      0.0 - 0.5 K/uL 0.1   Baso #      0.00 - 0.20 K/uL 0.05   nRBC      0 /100 WBC 0   Gran%      38.0 - 73.0 % 69.9   Lymph%      18.0 - 48.0 % 22.9   Mono%      4.0 - 15.0 % 5.6   Eosinophil%      0.0 - 8.0 % 0.6   Basophil%      0.0 - 1.9 % 0.5   Differential Method       Automated   Sodium      136 - 145 mmol/L 138   Potassium      3.5 - 5.1 mmol/L 4.2   Chloride      95 - 110 mmol/L 101   CO2      23 - 29 mmol/L 28   Glucose      70 - 110 mg/dL 95   BUN, Bld      6 - 20 mg/dL 12   Creatinine      0.5 - 1.4 mg/dL 0.9   Calcium      8.7 - 10.5 mg/dL 9.4   Total Protein      6.0 - 8.4 g/dL 8.4   Albumin      3.5 - 5.2 g/dL 4.3   Total Bilirubin      0.1 - 1.0 mg/dL 0.9   Alkaline Phosphatase      55 - 135 U/L 75   AST      10 - 40 U/L 21   ALT      10 - 44 U/L 19   Anion Gap      8 - 16 mmol/L 9   eGFR if African American      >60 mL/min/1.73 m:2 >60.0   eGFR if non African American      >60 mL/min/1.73 m:2 >60.0   CRP      0.0 - 8.2  mg/L 3.0   Sed Rate      0 - 20 mm/Hr 15        Assessment:       1. Joint pain/swelling/stiffness.  Currently stable.  May be influenced by hypocalcemia  2. S/p Thyroidectomy complicated by hypoparathyroidism.  3. Positive rheumatoid factor  4. Fatigue   5. Overweight.   6. Vitamin D deficiency.  Mildly low.  Currently on OTC 2,000 units daily  7. History vitamin B12 deficiency    Plan:       1. Labs  2. Continue Lodine  3. RTO 4 months/prn

## 2019-01-08 ENCOUNTER — PATIENT MESSAGE (OUTPATIENT)
Dept: RHEUMATOLOGY | Facility: CLINIC | Age: 49
End: 2019-01-08

## 2019-02-10 RX ORDER — CALCITRIOL 0.25 UG/1
CAPSULE ORAL
Qty: 60 CAPSULE | Refills: 2 | Status: SHIPPED | OUTPATIENT
Start: 2019-02-10 | End: 2023-02-07

## 2019-02-26 NOTE — DISCHARGE SUMMARY
CMP labs pended. Will flag for provider to review and order any additional labs.    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       Discharged Condition: good    Hospital Course: Following completion of an electively scheduled procedure, she was transferred to the floor for postoperative monitoring. her hospital course was significant for some symptomatic hypocalcemia, which was managed accordingly with optimizing calcium replacement regiment. Otherwise, stay was noted for adequate pain control and PO intake following surgery. she is discharged home in good condition and will follow-up with Dalia Alvarado NP in 1 week.    Final Diagnoses: Same as principal problem.    Disposition: Home or Self Care    Follow up/Patient Instructions:    Medications:  Reconciled Home Medications:   Current Discharge Medication List      START taking these medications    Details   calcitRIOL (ROCALTROL) 0.25 MCG Cap Take 1 capsule (0.25 mcg total) by mouth once daily.  Qty: 60 capsule, Refills: 0      calcium carbonate (OS-STEVE) 500 mg calcium (1,250 mg) tablet Take 2 tablets (1,000 mg total) by mouth 3 (three) times daily.  Qty: 60 tablet, Refills: 0      levothyroxine (SYNTHROID) 150 MCG tablet Take 1 tablet (150 mcg total) by mouth before breakfast.  Qty: 90 tablet, Refills: 1      ondansetron (ZOFRAN-ODT) 8 MG TbDL Take 1 tablet (8 mg total) by mouth every 8 (eight) hours as needed.  Qty: 25 tablet, Refills: 0      oxycodone-acetaminophen (PERCOCET) 5-325 mg per tablet Take 1 tablet by mouth every 4 (four) hours as needed.  Qty: 30 tablet, Refills: 0         CONTINUE these medications which have NOT CHANGED    Details   etodolac (LODINE XL) 400 MG 24 hr tablet  Take 1 tablet (400 mg total) by mouth once daily.  Qty: 90 tablet, Refills: 1    Associated Diagnoses: Bilateral thumb pain; Chronic pain of both ankles      spironolactone (ALDACTONE) 25 MG tablet Take 100 mg by mouth once daily.              Discharge Procedure Orders  Diet general     Weight bearing restrictions (specify)   Order Comments: Light activity only. No heavy lifting, straining, stooping, exercising.     Call MD for:  redness, tenderness, or signs of infection (pain, swelling, redness, odor or green/yellow discharge around incision site)     Call MD for:  severe uncontrolled pain     Call MD for:  persistent nausea and vomiting or diarrhea     Call MD for:  temperature >100.4     No dressing needed   Order Comments: Do not scrub dermabond or incision line. Will examine in first clinic appointment.       Follow-up Information     Follow up with Kathleen Alvarado NP In 1 week.    Specialty:  Otolaryngology    Contact information:    [address]  [facility] [address]  [phone]            Discharge Procedure Orders (must include Diet, Follow-up, Activity):    Discharge Procedure Orders (must include Diet, Follow-up, Activity)  Diet general     Weight bearing restrictions (specify)   Order Comments: Light activity only. No heavy lifting, straining, stooping, exercising.     Call MD for:  redness, tenderness, or signs of infection (pain, swelling, redness, odor or green/yellow discharge around incision site)     Call MD for:  severe uncontrolled pain     Call MD for:  persistent nausea and vomiting or diarrhea     Call MD for:  temperature >100.4     No dressing needed   Order Comments: Do not scrub dermabond or incision line. Will examine in first clinic appointment.

## 2019-08-26 ENCOUNTER — OFFICE VISIT (OUTPATIENT)
Dept: RHEUMATOLOGY | Facility: CLINIC | Age: 49
End: 2019-08-26
Payer: COMMERCIAL

## 2019-08-26 VITALS
SYSTOLIC BLOOD PRESSURE: 123 MMHG | DIASTOLIC BLOOD PRESSURE: 75 MMHG | BODY MASS INDEX: 29.03 KG/M2 | HEART RATE: 80 BPM | WEIGHT: 196 LBS | HEIGHT: 69 IN

## 2019-08-26 DIAGNOSIS — M25.649 STIFFNESS OF HAND JOINT, UNSPECIFIED LATERALITY: ICD-10-CM

## 2019-08-26 DIAGNOSIS — R53.83 FATIGUE, UNSPECIFIED TYPE: ICD-10-CM

## 2019-08-26 DIAGNOSIS — R76.8 RHEUMATOID FACTOR POSITIVE: Primary | ICD-10-CM

## 2019-08-26 DIAGNOSIS — E55.9 VITAMIN D DEFICIENCY: ICD-10-CM

## 2019-08-26 DIAGNOSIS — E83.51 HYPOCALCEMIA: ICD-10-CM

## 2019-08-26 PROCEDURE — 99214 OFFICE O/P EST MOD 30 MIN: CPT | Mod: S$GLB,,, | Performed by: INTERNAL MEDICINE

## 2019-08-26 PROCEDURE — 99214 PR OFFICE/OUTPT VISIT, EST, LEVL IV, 30-39 MIN: ICD-10-PCS | Mod: S$GLB,,, | Performed by: INTERNAL MEDICINE

## 2019-08-26 PROCEDURE — 3008F BODY MASS INDEX DOCD: CPT | Mod: CPTII,S$GLB,, | Performed by: INTERNAL MEDICINE

## 2019-08-26 PROCEDURE — 99999 PR PBB SHADOW E&M-EST. PATIENT-LVL III: CPT | Mod: PBBFAC,,, | Performed by: INTERNAL MEDICINE

## 2019-08-26 PROCEDURE — 3008F PR BODY MASS INDEX (BMI) DOCUMENTED: ICD-10-PCS | Mod: CPTII,S$GLB,, | Performed by: INTERNAL MEDICINE

## 2019-08-26 PROCEDURE — 99999 PR PBB SHADOW E&M-EST. PATIENT-LVL III: ICD-10-PCS | Mod: PBBFAC,,, | Performed by: INTERNAL MEDICINE

## 2019-08-26 ASSESSMENT — ROUTINE ASSESSMENT OF PATIENT INDEX DATA (RAPID3)
PSYCHOLOGICAL DISTRESS SCORE: 1.1
TOTAL RAPID3 SCORE: 1.61
FATIGUE SCORE: 6
PATIENT GLOBAL ASSESSMENT SCORE: 2.5
WHEN YOU AWAKENED IN THE MORNING OVER THE LAST WEEK, PLEASE INDICATE THE AMOUNT OF TIME IT TAKES UNTIL YOU ARE AS LIMBER AS YOU WILL BE FOR THE DAY: 30 MINUTES
MDHAQ FUNCTION SCORE: .1
AM STIFFNESS SCORE: 1, YES
PAIN SCORE: 2

## 2019-08-26 NOTE — PROGRESS NOTES
"Subjective:       Patient ID: Kristina Graham is a 48 y.o. female.    Chief Complaint: Hand stiffness    HPI:  Kristina Graham is a 48 y.o. female with  history of positive RF and joint pain.  No treatment given. Hands ache intermittently but not as bad.  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      Pain 2/10 ache in hands. Improves with rest and worsens with movement regarding hands.  Only took Lodine.   Morning stiffness for 30 minutes. Several months of fatigue.      Left peroneal nerve compression on EMG for left toe numbness in 2011 patient declined surgery.     History of B12 deficiency, vitamin D deficiency and acne treated with spironolactone.  In 2010 Parvovirus and EBV infections. IgM positive for both.           Hospitalized due to hypocalcemia after parathyroid knicked after total thyroidectomy 2/2017.  Now on Natpara for hypocalcemia injects daily.    Denies any pain.     Review of Systems   Constitutional: Negative for fever and unexpected weight change.   HENT: Negative for trouble swallowing.    Eyes: Negative for redness.   Respiratory: Negative for cough and shortness of breath.    Cardiovascular: Negative for chest pain.   Gastrointestinal: Negative for constipation and diarrhea.   Genitourinary: Negative for dysuria and genital sores.   Musculoskeletal: Positive for arthralgias.   Skin: Negative for rash.   Neurological: Negative for headaches.   Hematological: Does not bruise/bleed easily.   Psychiatric/Behavioral: Negative.          Objective:   /75 (BP Location: Right arm, Patient Position: Sitting, BP Method: Large (Automatic))   Pulse 80   Ht 5' 9" (1.753 m)   Wt 88.9 kg (195 lb 15.8 oz)   BMI 28.94 kg/m²      Physical Exam   Constitutional: She is oriented to person, place, and time and well-developed, well-nourished, and in no distress.   HENT:   Head: Normocephalic and atraumatic.   Eyes: Conjunctivae and EOM are " normal.   Neck: Neck supple.   Cardiovascular: Normal rate, regular rhythm and normal heart sounds.    Pulmonary/Chest: Effort normal and breath sounds normal.   Abdominal: Soft. Bowel sounds are normal.   Neurological: She is alert and oriented to person, place, and time. Gait normal.   Skin: Skin is warm and dry.     Psychiatric: Mood and affect normal.   Musculoskeletal:   28 joint count: 0 tender and 0 swollen           LABS    Component      Latest Ref Rng & Units 1/7/2019   WBC      3.90 - 12.70 K/uL 8.78   RBC      4.00 - 5.40 M/uL 4.73   Hemoglobin      12.0 - 16.0 g/dL 14.5   Hematocrit      37.0 - 48.5 % 43.4   MCV      82 - 98 fL 92   MCH      27.0 - 31.0 pg 30.7   MCHC      32.0 - 36.0 g/dL 33.4   RDW      11.5 - 14.5 % 12.3   Platelets      150 - 350 K/uL 244   MPV      9.2 - 12.9 fL 9.8   Immature Granulocytes      0.0 - 0.5 % 0.8 (H)   Gran # (ANC)      1.8 - 7.7 K/uL 5.6   Immature Grans (Abs)      0.00 - 0.04 K/uL 0.07 (H)   Lymph #      1.0 - 4.8 K/uL 2.3   Mono #      0.3 - 1.0 K/uL 0.6   Eos #      0.0 - 0.5 K/uL 0.2   Baso #      0.00 - 0.20 K/uL 0.05   nRBC      0 /100 WBC 0   Gran%      38.0 - 73.0 % 64.2   Lymph%      18.0 - 48.0 % 25.7   Mono%      4.0 - 15.0 % 6.9   Eosinophil%      0.0 - 8.0 % 1.8   Basophil%      0.0 - 1.9 % 0.6   Differential Method       Automated   Sodium      136 - 145 mmol/L 137   Potassium      3.5 - 5.1 mmol/L 4.4   Chloride      95 - 110 mmol/L 103   CO2      23 - 29 mmol/L 27   Glucose      70 - 110 mg/dL 99   BUN, Bld      6 - 20 mg/dL 11   Creatinine      0.5 - 1.4 mg/dL 0.8   Calcium      8.7 - 10.5 mg/dL 8.6 (L)   PROTEIN TOTAL      6.0 - 8.4 g/dL 7.8   Albumin      3.5 - 5.2 g/dL 3.7   BILIRUBIN TOTAL      0.1 - 1.0 mg/dL 0.4   Alkaline Phosphatase      55 - 135 U/L 61   AST      10 - 40 U/L 17   ALT      10 - 44 U/L 18   Anion Gap      8 - 16 mmol/L 7 (L)   eGFR if African American      >60 mL/min/1.73 m:2 >60.0   eGFR if non       >60  mL/min/1.73 m:2 >60.0   CRP      0.0 - 8.2 mg/L 3.3   Sed Rate      0 - 36 mm/Hr 12   Vit D, 25-Hydroxy      30 - 96 ng/mL 33        Assessment:       1. Joint pain/swelling/stiffness.  Currently stable.  May be influenced by hypocalcemia  2. S/p Thyroidectomy complicated by hypoparathyroidism and hypocalcemia.  No on Natpara.  3. Positive rheumatoid factor  4. Fatigue   5. Overweight.   6. Vitamin D deficiency.  Currently on OTC 2,000 units daily.  7. History vitamin B12 deficiency    Plan:       1. Labs  2. Continue Lodine  3. RTO 4 months/prn

## 2019-08-27 ENCOUNTER — PATIENT MESSAGE (OUTPATIENT)
Dept: RHEUMATOLOGY | Facility: CLINIC | Age: 49
End: 2019-08-27

## 2019-10-10 ENCOUNTER — HOSPITAL ENCOUNTER (EMERGENCY)
Facility: HOSPITAL | Age: 49
Discharge: HOME | End: 2019-10-10
Attending: EMERGENCY MEDICINE
Payer: COMMERCIAL

## 2019-10-10 VITALS
HEIGHT: 69 IN | HEART RATE: 80 BPM | OXYGEN SATURATION: 97 % | RESPIRATION RATE: 18 BRPM | TEMPERATURE: 98 F | SYSTOLIC BLOOD PRESSURE: 127 MMHG | DIASTOLIC BLOOD PRESSURE: 69 MMHG | WEIGHT: 180 LBS | BODY MASS INDEX: 26.66 KG/M2

## 2019-10-10 DIAGNOSIS — E83.39 HYPERPHOSPHATEMIA: Primary | ICD-10-CM

## 2019-10-10 LAB
ALBUMIN SERPL BCP-MCNC: 4.4 G/DL (ref 3.5–5.2)
ALP SERPL-CCNC: 83 U/L (ref 55–135)
ALT SERPL W/O P-5'-P-CCNC: 21 U/L (ref 10–44)
ANION GAP SERPL CALC-SCNC: 9 MMOL/L (ref 8–16)
AST SERPL-CCNC: 18 U/L (ref 10–40)
B-HCG UR QL: NEGATIVE
BASOPHILS # BLD AUTO: 0.04 K/UL (ref 0–0.2)
BASOPHILS NFR BLD: 0.4 % (ref 0–1.9)
BILIRUB DIRECT SERPL-MCNC: 0.2 MG/DL (ref 0.1–0.3)
BILIRUB SERPL-MCNC: 0.5 MG/DL (ref 0.1–1)
BUN SERPL-MCNC: 7 MG/DL (ref 6–30)
BUN SERPL-MCNC: 8 MG/DL (ref 6–20)
CALCIUM SERPL-MCNC: 9.7 MG/DL (ref 8.7–10.5)
CHLORIDE SERPL-SCNC: 101 MMOL/L (ref 95–110)
CHLORIDE SERPL-SCNC: 99 MMOL/L (ref 95–110)
CO2 SERPL-SCNC: 29 MMOL/L (ref 23–29)
CREAT SERPL-MCNC: 0.8 MG/DL (ref 0.5–1.4)
CREAT SERPL-MCNC: 0.9 MG/DL (ref 0.5–1.4)
CTP QC/QA: YES
DIFFERENTIAL METHOD: NORMAL
EOSINOPHIL # BLD AUTO: 0.1 K/UL (ref 0–0.5)
EOSINOPHIL NFR BLD: 1.2 % (ref 0–8)
ERYTHROCYTE [DISTWIDTH] IN BLOOD BY AUTOMATED COUNT: 11.6 % (ref 11.5–14.5)
EST. GFR  (AFRICAN AMERICAN): >60 ML/MIN/1.73 M^2
EST. GFR  (NON AFRICAN AMERICAN): >60 ML/MIN/1.73 M^2
GLUCOSE SERPL-MCNC: 108 MG/DL (ref 70–110)
GLUCOSE SERPL-MCNC: 108 MG/DL (ref 70–110)
HCT VFR BLD AUTO: 43.9 % (ref 37–48.5)
HCT VFR BLD CALC: 43 %PCV (ref 36–54)
HGB BLD-MCNC: 14.8 G/DL (ref 12–16)
IMM GRANULOCYTES # BLD AUTO: 0.04 K/UL (ref 0–0.04)
IMM GRANULOCYTES NFR BLD AUTO: 0.4 % (ref 0–0.5)
LYMPHOCYTES # BLD AUTO: 2.2 K/UL (ref 1–4.8)
LYMPHOCYTES NFR BLD: 23.2 % (ref 18–48)
MAGNESIUM SERPL-MCNC: 1.9 MG/DL (ref 1.6–2.6)
MCH RBC QN AUTO: 30 PG (ref 27–31)
MCHC RBC AUTO-ENTMCNC: 33.7 G/DL (ref 32–36)
MCV RBC AUTO: 89 FL (ref 82–98)
MONOCYTES # BLD AUTO: 0.9 K/UL (ref 0.3–1)
MONOCYTES NFR BLD: 9.1 % (ref 4–15)
NEUTROPHILS # BLD AUTO: 6.2 K/UL (ref 1.8–7.7)
NEUTROPHILS NFR BLD: 65.7 % (ref 38–73)
NRBC BLD-RTO: 0 /100 WBC
PHOSPHATE SERPL-MCNC: 5.6 MG/DL (ref 2.7–4.5)
PLATELET # BLD AUTO: 267 K/UL (ref 150–350)
PMV BLD AUTO: 9.5 FL (ref 9.2–12.9)
POC IONIZED CALCIUM: 1.08 MMOL/L (ref 1.06–1.42)
POC TCO2 (MEASURED): 30 MMOL/L (ref 23–29)
POTASSIUM BLD-SCNC: 4.2 MMOL/L (ref 3.5–5.1)
POTASSIUM SERPL-SCNC: 4.4 MMOL/L (ref 3.5–5.1)
PROT SERPL-MCNC: 8.3 G/DL (ref 6–8.4)
RBC # BLD AUTO: 4.93 M/UL (ref 4–5.4)
SAMPLE: ABNORMAL
SODIUM BLD-SCNC: 139 MMOL/L (ref 136–145)
SODIUM SERPL-SCNC: 139 MMOL/L (ref 136–145)
T4 FREE SERPL-MCNC: 0.98 NG/DL (ref 0.71–1.51)
TSH SERPL DL<=0.005 MIU/L-ACNC: <0.01 UIU/ML (ref 0.4–4)
WBC # BLD AUTO: 9.42 K/UL (ref 3.9–12.7)

## 2019-10-10 PROCEDURE — 83735 ASSAY OF MAGNESIUM: CPT

## 2019-10-10 PROCEDURE — 85025 COMPLETE CBC W/AUTO DIFF WBC: CPT

## 2019-10-10 PROCEDURE — 99284 EMERGENCY DEPT VISIT MOD MDM: CPT | Mod: ,,, | Performed by: EMERGENCY MEDICINE

## 2019-10-10 PROCEDURE — 84100 ASSAY OF PHOSPHORUS: CPT

## 2019-10-10 PROCEDURE — 80048 BASIC METABOLIC PNL TOTAL CA: CPT

## 2019-10-10 PROCEDURE — 93005 ELECTROCARDIOGRAM TRACING: CPT

## 2019-10-10 PROCEDURE — 84439 ASSAY OF FREE THYROXINE: CPT

## 2019-10-10 PROCEDURE — 93010 EKG 12-LEAD: ICD-10-PCS | Mod: ,,, | Performed by: INTERNAL MEDICINE

## 2019-10-10 PROCEDURE — 84443 ASSAY THYROID STIM HORMONE: CPT

## 2019-10-10 PROCEDURE — 99284 EMERGENCY DEPT VISIT MOD MDM: CPT | Mod: 25

## 2019-10-10 PROCEDURE — 80076 HEPATIC FUNCTION PANEL: CPT

## 2019-10-10 PROCEDURE — 99284 PR EMERGENCY DEPT VISIT,LEVEL IV: ICD-10-PCS | Mod: ,,, | Performed by: EMERGENCY MEDICINE

## 2019-10-10 PROCEDURE — 93010 ELECTROCARDIOGRAM REPORT: CPT | Mod: ,,, | Performed by: INTERNAL MEDICINE

## 2019-10-10 PROCEDURE — 81025 URINE PREGNANCY TEST: CPT | Performed by: EMERGENCY MEDICINE

## 2019-10-10 NOTE — ED PROVIDER NOTES
Encounter Date: 10/10/2019       History     Chief Complaint   Patient presents with    abnromal lab     calcium 7.2, damaged parathyroid, had recall on meds     49 yo W with pmhx RA, thyroidectomy and iatrogenic parathyroidectomy presents with chief complaint of hypocalcemia.  Patient takes a parathyroid hormone supplement called Natpara.  It was recently recalled, so patient did not take it for the past 2 days.  Over this time, she reports twitching in her face and generalized malaise.  She had outpatient labs drawn yesterday which revealed hypocalcemia of 7.2.  She was instructed by her nurse practitioner to come to the emergency department immediately.  She was told to follow up at [facility] but patient came to [facility] given that she has had success with Dr. Damico in the past.  Prior to this, patient had a calcium of 9.1 on .  On review of labs today, patient had a ionized calcium 4.3.  Patient reports some nausea but denies any at this time.  She denies any vomiting, diarrhea, dysuria, urinary frequency. No palpitations.  Prior to the past 2 days, patient reports compliance with her thyroid and parathyroid medications.          Review of patient's allergies indicates:   Allergen Reactions    Bactrim [sulfamethoxazole-trimethoprim]      Past Medical History:   Diagnosis Date    Arthritis     History of vitamin D deficiency 2016    Nontoxic multinodular goiter 2017    Rheumatoid arthritis(714.0)      Past Surgical History:   Procedure Laterality Date    ABDOMINAL SURGERY       SECTION, LOW TRANSVERSE       Family History   Problem Relation Age of Onset    Cancer Mother     Diabetes Mother     Hypertension Mother     Cataracts Mother     Glaucoma Mother     Cancer Father     Hypertension Father     Heart disease Father     Heart disease Paternal Grandfather     Hypertension Paternal Grandfather     Hypertension Sister     Cancer Maternal Aunt     Cancer  Paternal Uncle     Hypertension Paternal Uncle     Cancer Maternal Grandmother     Diabetes Maternal Grandmother     Hypertension Maternal Grandmother     Glaucoma Maternal Grandmother     Heart disease Paternal Grandmother     Hypertension Paternal Grandmother     Glaucoma Maternal Grandfather     Amblyopia Neg Hx     Blindness Neg Hx     Macular degeneration Neg Hx     Retinal detachment Neg Hx     Strabismus Neg Hx      Social History     Tobacco Use    Smoking status: Never Smoker    Smokeless tobacco: Never Used    Tobacco comment: drug rep   Substance Use Topics    Alcohol use: Yes     Comment: social    Drug use: No     Review of Systems   Constitutional: Negative for fever.   HENT: Negative for congestion.    Eyes: Negative for discharge.   Respiratory: Negative for shortness of breath.    Cardiovascular: Negative for chest pain.   Gastrointestinal: Negative for abdominal pain.   Endocrine: Negative for polyuria.   Genitourinary: Negative for dysuria.   Musculoskeletal: Negative for back pain.   Skin: Negative for wound.   Allergic/Immunologic: Negative for immunocompromised state.   Neurological: Negative for headaches.   Hematological: Does not bruise/bleed easily.   Psychiatric/Behavioral: Negative for confusion.       Physical Exam     Initial Vitals [10/10/19 1636]   BP Pulse Resp Temp SpO2   (!) 139/91 100 18 98.3 °F (36.8 °C) 100 %      MAP       --         Physical Exam    Nursing note and vitals reviewed.  Constitutional: She appears well-developed and well-nourished. She is not diaphoretic. No distress.   HENT:   Head: Normocephalic and atraumatic.   Eyes: EOM are normal. Pupils are equal, round, and reactive to light. Right eye exhibits no discharge. Left eye exhibits no discharge. No scleral icterus.   Neck: Normal range of motion. Neck supple. No JVD present.   Cardiovascular: Normal rate, regular rhythm, normal heart sounds and intact distal pulses. Exam reveals no gallop and  no friction rub.    No murmur heard.  Pulmonary/Chest: Breath sounds normal. No respiratory distress. She has no wheezes. She has no rhonchi. She has no rales. She exhibits no tenderness.   Abdominal: Soft. Bowel sounds are normal. She exhibits no distension and no mass. There is no tenderness. There is no rebound and no guarding.   Musculoskeletal: Normal range of motion. She exhibits no edema or tenderness.   Lymphadenopathy:     She has no cervical adenopathy.   Neurological: She is alert and oriented to person, place, and time. She has normal strength. No sensory deficit.   chovstek's sign is negative   Skin: Skin is warm and dry. Capillary refill takes less than 2 seconds.   Psychiatric: She has a normal mood and affect.         ED Course   Procedures  Labs Reviewed   PHOSPHORUS - Abnormal; Notable for the following components:       Result Value    Phosphorus 5.6 (*)     All other components within normal limits   ISTAT PROCEDURE - Abnormal; Notable for the following components:    POC TCO2 (MEASURED) 30 (*)     All other components within normal limits   BASIC METABOLIC PANEL   CBC W/ AUTO DIFFERENTIAL   MAGNESIUM   HEPATIC FUNCTION PANEL   TSH   POCT URINE PREGNANCY     EKG Readings: (Independently Interpreted)   Previous EKG: Compared with most recent EKG Rhythm: Normal Sinus Rhythm. Heart Rate: 75. ST Segments: Normal ST Segments. T Waves Flipped: AVL. Axis: Normal. [facility] ED EKG2 - Other Findings: normal intervals.       Imaging Results    None          Medical Decision Making:   History:   I obtained history from: someone other than patient.  Old Medical Records: I decided to obtain old medical records.  Initial Assessment:   49 yo W with pmhx RA, thyroidectomy and iatrogenic parathyroidectomy presents with chief complaint of hypocalcemia.  Differential Diagnosis:   Hypocalcemia, thyroid issues, parathyroid issues, laboratory error, medication side effect, electrolyte abnormalities, arrhythmia  Clinical  Tests:   Lab Tests: Ordered  ED Management:  Will obtain repeat labs here, EKG, cardiac monitoring.    Reassessment:  CBC within normal limits. CMP reveals no emergent electrolyte abnormalities.  There is mild hyperphosphatemia of 5.6.  However, calcium is 9.7.  Chem 8 reveals normal electrolytes with ionized calcium of 1.08.  On reassessment, patient remains stable appearing.  I see no evidence of acute hypocalcemia at this time.  Previous laboratory finding may be due to error.  Additionally, patient is actively been taking her calcium supplement which could have improved some of this.  I remain unable to appreciate any twitching in her face.  Patient provided with copy of laboratory findings.  Advised follow-up with her PCP for repeat phosphorus draw in 1 week.  Provided with extensive return precautions.  At time of discharge, TSH pending, but I see no evidence of myxedema coma or thyroid storm at this time.    Addendum:  TSH low, free T4 normal.                        Clinical Impression:       ICD-10-CM ICD-9-CM   1. Hyperphosphatemia E83.39 275.3                                Abhinav Ivory MD  10/10/19 1907       Abhinav Ivory MD  10/10/19 1929

## 2019-10-10 NOTE — ED NOTES
Pt on cardiac monitor, bp cuff and continuous pulse ox.  at bedside, call light within reach. Will continue to monitor.

## 2019-10-10 NOTE — ED TRIAGE NOTES
Pt presents with c/o not feeling well, headache, lips twitching. Pt had labs drawn on 10/9 and was told to go to ER for calcium 7.2. Pt had thyroidectomy three years ago, parathyroid nicked, and one removed, pt has had calcium issues, pt was on napara which was recalled, pt quit taking 2 days ago.

## 2019-10-11 NOTE — DISCHARGE INSTRUCTIONS
Your Calcium levels were good today.  Be sure to follow up their primary care doctor for repeat phosphorus level.

## 2019-12-16 ENCOUNTER — OFFICE VISIT (OUTPATIENT)
Dept: RHEUMATOLOGY | Facility: CLINIC | Age: 49
End: 2019-12-16
Payer: COMMERCIAL

## 2019-12-16 ENCOUNTER — HOSPITAL ENCOUNTER (OUTPATIENT)
Dept: RADIOLOGY | Facility: HOSPITAL | Age: 49
Discharge: HOME | End: 2019-12-16
Attending: INTERNAL MEDICINE
Payer: COMMERCIAL

## 2019-12-16 VITALS
BODY MASS INDEX: 25.7 KG/M2 | SYSTOLIC BLOOD PRESSURE: 118 MMHG | HEART RATE: 87 BPM | DIASTOLIC BLOOD PRESSURE: 78 MMHG | HEIGHT: 69 IN | WEIGHT: 173.5 LBS

## 2019-12-16 DIAGNOSIS — M25.649 STIFFNESS OF HAND JOINT, UNSPECIFIED LATERALITY: ICD-10-CM

## 2019-12-16 DIAGNOSIS — R53.83 FATIGUE, UNSPECIFIED TYPE: ICD-10-CM

## 2019-12-16 DIAGNOSIS — E83.51 HYPOCALCEMIA: ICD-10-CM

## 2019-12-16 DIAGNOSIS — R76.8 RHEUMATOID FACTOR POSITIVE: Primary | ICD-10-CM

## 2019-12-16 DIAGNOSIS — R76.8 RHEUMATOID FACTOR POSITIVE: ICD-10-CM

## 2019-12-16 PROCEDURE — 77077 XR ARTHRITIS SURVEY: ICD-10-PCS | Mod: 26,,, | Performed by: RADIOLOGY

## 2019-12-16 PROCEDURE — 99214 PR OFFICE/OUTPT VISIT, EST, LEVL IV, 30-39 MIN: ICD-10-PCS | Mod: S$GLB,,, | Performed by: INTERNAL MEDICINE

## 2019-12-16 PROCEDURE — 99999 PR PBB SHADOW E&M-EST. PATIENT-LVL III: CPT | Mod: PBBFAC,,, | Performed by: INTERNAL MEDICINE

## 2019-12-16 PROCEDURE — 99999 PR PBB SHADOW E&M-EST. PATIENT-LVL III: ICD-10-PCS | Mod: PBBFAC,,, | Performed by: INTERNAL MEDICINE

## 2019-12-16 PROCEDURE — 77077 JOINT SURVEY SINGLE VIEW: CPT | Mod: 26,,, | Performed by: RADIOLOGY

## 2019-12-16 PROCEDURE — 3008F BODY MASS INDEX DOCD: CPT | Mod: CPTII,S$GLB,, | Performed by: INTERNAL MEDICINE

## 2019-12-16 PROCEDURE — 3008F PR BODY MASS INDEX (BMI) DOCUMENTED: ICD-10-PCS | Mod: CPTII,S$GLB,, | Performed by: INTERNAL MEDICINE

## 2019-12-16 PROCEDURE — 99214 OFFICE O/P EST MOD 30 MIN: CPT | Mod: S$GLB,,, | Performed by: INTERNAL MEDICINE

## 2019-12-16 PROCEDURE — 77077 JOINT SURVEY SINGLE VIEW: CPT | Mod: TC

## 2019-12-16 ASSESSMENT — ROUTINE ASSESSMENT OF PATIENT INDEX DATA (RAPID3)
WHEN YOU AWAKENED IN THE MORNING OVER THE LAST WEEK, PLEASE INDICATE THE AMOUNT OF TIME IT TAKES UNTIL YOU ARE AS LIMBER AS YOU WILL BE FOR THE DAY: 1 HOUR
AM STIFFNESS SCORE: 1, YES
MDHAQ FUNCTION SCORE: .2
FATIGUE SCORE: 7.5
PATIENT GLOBAL ASSESSMENT SCORE: 5
PAIN SCORE: 5
PSYCHOLOGICAL DISTRESS SCORE: 3.3
TOTAL RAPID3 SCORE: 3.56

## 2019-12-16 NOTE — PROGRESS NOTES
Rapid3 Question Responses and Scores 12/14/2019   MDHAQ Score 0.2   Psychologic Score 3.3   Pain Score 5   When you awakened in the morning OVER THE LAST WEEK, did you feel stiff? Yes   If Yes, please indicate the number of hours until you are as limber as you will be for the day 1   Fatigue Score 7.5   Global Health Score 5   RAPID3 Score 3.55       Answers for HPI/ROS submitted by the patient on 12/14/2019   fever: No  eye redness: No  headaches: No  shortness of breath: No  chest pain: No  trouble swallowing: No  diarrhea: No  constipation: No  unexpected weight change: No  genital sore: No  dysuria: No  During the last 3 days, have you had a skin rash?: No  Bruises or bleeds easily: No  cough: No

## 2019-12-16 NOTE — PROGRESS NOTES
"Subjective:       Patient ID: Kristina Graham is a 49 y.o. female.    Chief Complaint: Hand stiffness    HPI:  Kristina Graham is a 49 y.o. female with  history of positive RF and joint pain.  No treatment given. Hands ache intermittently but not as bad.  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      Pain 2/10 ache in hands. Improves with rest and worsens with movement regarding hands.  Only took Lodine.   Morning stiffness for 30 minutes. Several months of fatigue.      Left peroneal nerve compression on EMG for left toe numbness in 2011 patient declined surgery.     History of B12 deficiency, vitamin D deficiency and acne treated with spironolactone.  In 2010 Parvovirus and EBV infections. IgM positive for both.           Due to hypoparathyroid medication being Natpara taken off the market she developed hypocalcemia.  Parathyroid knicked after total thyroidectomy 2/2017.  Episode of right knee pain and swelling.    Lost 12 pounds due to feeling sick from Calcitriol placed due to Natpara being taken off market.        Review of Systems   Constitutional: Negative for fever and unexpected weight change.   HENT: Negative for trouble swallowing.    Eyes: Negative for redness.   Respiratory: Negative for cough and shortness of breath.    Cardiovascular: Negative for chest pain.   Gastrointestinal: Negative for constipation and diarrhea.   Genitourinary: Negative for dysuria and genital sores.   Musculoskeletal: Positive for arthralgias.   Skin: Negative for rash.   Neurological: Negative for headaches.   Hematological: Does not bruise/bleed easily.   Psychiatric/Behavioral: Negative.          Objective:   /78 (BP Location: Left arm, Patient Position: Sitting, BP Method: Medium (Automatic))   Pulse 87   Ht 5' 9" (1.753 m)   Wt 78.7 kg (173 lb 8 oz)   BMI 25.62 kg/m²      Physical Exam   Constitutional: She is oriented to person, place, and time " and well-developed, well-nourished, and in no distress.   HENT:   Head: Normocephalic and atraumatic.   Eyes: Conjunctivae and EOM are normal.   Neck: Neck supple.   Cardiovascular: Normal rate, regular rhythm and normal heart sounds.    Pulmonary/Chest: Effort normal and breath sounds normal.   Abdominal: Soft. Bowel sounds are normal.   Neurological: She is alert and oriented to person, place, and time. Gait normal.   Skin: Skin is warm and dry.     Psychiatric: Mood and affect normal.   Musculoskeletal:   28 joint count: 0 tender and 0 swollen           LABS    Component      Latest Ref Rng & Units 10/10/2019   WBC      3.90 - 12.70 K/uL 9.42   RBC      4.00 - 5.40 M/uL 4.93   Hemoglobin      12.0 - 16.0 g/dL 14.8   Hematocrit      37.0 - 48.5 % 43.9   MCV      82 - 98 fL 89   MCH      27.0 - 31.0 pg 30.0   MCHC      32.0 - 36.0 g/dL 33.7   RDW      11.5 - 14.5 % 11.6   Platelets      150 - 350 K/uL 267   MPV      9.2 - 12.9 fL 9.5   Immature Granulocytes      0.0 - 0.5 % 0.4   Gran # (ANC)      1.8 - 7.7 K/uL 6.2   Immature Grans (Abs)      0.00 - 0.04 K/uL 0.04   Lymph #      1.0 - 4.8 K/uL 2.2   Mono #      0.3 - 1.0 K/uL 0.9   Eos #      0.0 - 0.5 K/uL 0.1   Baso #      0.00 - 0.20 K/uL 0.04   nRBC      0 /100 WBC 0   Gran%      38.0 - 73.0 % 65.7   Lymph%      18.0 - 48.0 % 23.2   Mono%      4.0 - 15.0 % 9.1   Eosinophil%      0.0 - 8.0 % 1.2   Basophil%      0.0 - 1.9 % 0.4   Differential Method       Automated   Sodium      136 - 145 mmol/L 139   Potassium      3.5 - 5.1 mmol/L 4.4   Chloride      95 - 110 mmol/L 101   CO2      23 - 29 mmol/L 29   Glucose      70 - 110 mg/dL 108   BUN, Bld      6 - 20 mg/dL 8   Creatinine      0.5 - 1.4 mg/dL 0.9   Calcium      8.7 - 10.5 mg/dL 9.7   Anion Gap      8 - 16 mmol/L 9   eGFR if African American      >60 mL/min/1.73 m:2 >60.0   eGFR if non African American      >60 mL/min/1.73 m:2 >60.0   POC Glucose      70 - 110 mg/dL 108   POC BUN      6 - 30 mg/dL 7   POC  Creatinine      0.5 - 1.4 mg/dL 0.8   POC Sodium      136 - 145 mmol/L 139   POC Potassium      3.5 - 5.1 mmol/L 4.2   POC Chloride      95 - 110 mmol/L 99   POC TCO2 (MEASURED)      23 - 29 mmol/L 30 (H)   POC Ionized Calcium      1.06 - 1.42 mmol/L 1.08   POC Hematocrit      36 - 54 %PCV 43   Sample       TODD   PROTEIN TOTAL      6.0 - 8.4 g/dL 8.3   Albumin      3.5 - 5.2 g/dL 4.4   BILIRUBIN TOTAL      0.1 - 1.0 mg/dL 0.5   Bilirubin, Direct      0.1 - 0.3 mg/dL 0.2   AST      10 - 40 U/L 18   ALT      10 - 44 U/L 21   Alkaline Phosphatase      55 - 135 U/L 83   Preg Test, Ur      Negative Negative    Acceptable       Yes   Magnesium      1.6 - 2.6 mg/dL 1.9   TSH      0.400 - 4.000 uIU/mL <0.010 (L)   Phosphorus      2.7 - 4.5 mg/dL 5.6 (H)   Free T4      0.71 - 1.51 ng/dL 0.98        Assessment:       1. Joint pain/swelling/stiffness.  Currently stable.  May be influenced by hypocalcemia  2. S/p Thyroidectomy complicated by hypoparathyroidism and hypocalcemia.  No longer on Natpara.  Taking Calcitriol.  3. Positive rheumatoid factor  4. Fatigue   5. Overweight.   6. Vitamin D deficiency.  Currently on OTC 2,000 units daily.  7. History vitamin B12 deficiency      Plan:       1. Labs (ESR and CRP) and arthritis survey  2. Continue Lodine  3. RTO 4 months/prn

## 2019-12-30 ENCOUNTER — OFFICE VISIT (OUTPATIENT)
Dept: OPTOMETRY | Facility: CLINIC | Age: 49
End: 2019-12-30
Payer: COMMERCIAL

## 2019-12-30 DIAGNOSIS — H52.4 MYOPIA WITH ASTIGMATISM AND PRESBYOPIA, RIGHT: ICD-10-CM

## 2019-12-30 DIAGNOSIS — H52.11 MYOPIA WITH ASTIGMATISM AND PRESBYOPIA, RIGHT: ICD-10-CM

## 2019-12-30 DIAGNOSIS — Z01.00 EXAMINATION OF EYES AND VISION: Primary | ICD-10-CM

## 2019-12-30 DIAGNOSIS — H52.201 MYOPIA WITH ASTIGMATISM AND PRESBYOPIA, RIGHT: ICD-10-CM

## 2019-12-30 PROCEDURE — 92014 COMPRE OPH EXAM EST PT 1/>: CPT | Mod: S$GLB,,, | Performed by: OPTOMETRIST

## 2019-12-30 PROCEDURE — 92015 PR REFRACTION: ICD-10-PCS | Mod: S$GLB,,, | Performed by: OPTOMETRIST

## 2019-12-30 PROCEDURE — 99999 PR PBB SHADOW E&M-EST. PATIENT-LVL III: CPT | Mod: PBBFAC,,, | Performed by: OPTOMETRIST

## 2019-12-30 PROCEDURE — 92015 DETERMINE REFRACTIVE STATE: CPT | Mod: S$GLB,,, | Performed by: OPTOMETRIST

## 2019-12-30 PROCEDURE — 92014 PR EYE EXAM, EST PATIENT,COMPREHESV: ICD-10-PCS | Mod: S$GLB,,, | Performed by: OPTOMETRIST

## 2019-12-30 PROCEDURE — 99999 PR PBB SHADOW E&M-EST. PATIENT-LVL III: ICD-10-PCS | Mod: PBBFAC,,, | Performed by: OPTOMETRIST

## 2019-12-30 NOTE — PROGRESS NOTES
HPI     Annual eye exam   No vision issues or complaints  Not using Gtts  Not using OTC readers     Last edited by Adal Villaseñor, OD on 12/30/2019  1:23 PM. (History)            Assessment /Plan     For exam results, see Encounter Report.    Examination of eyes and vision  -MGD Systane PRN    Myopia with astigmatism and presbyopia, right  Eyeglass Final Rx     Eyeglass Final Rx       Sphere Cylinder Axis Dist VA Add    Right -0.75 +0.75 090 20/20 +1.75    Left -0.25 Sphere  20/20 +1.75    Expiration Date:  12/30/2020              Optional      RTC 1 yr

## 2020-01-27 ENCOUNTER — OFFICE VISIT (OUTPATIENT)
Dept: URGENT CARE | Facility: CLINIC | Age: 50
End: 2020-01-27
Payer: COMMERCIAL

## 2020-01-27 VITALS
SYSTOLIC BLOOD PRESSURE: 126 MMHG | HEART RATE: 91 BPM | DIASTOLIC BLOOD PRESSURE: 76 MMHG | TEMPERATURE: 97 F | OXYGEN SATURATION: 100 % | BODY MASS INDEX: 25.62 KG/M2 | HEIGHT: 69 IN

## 2020-01-27 DIAGNOSIS — R50.9 FEVER, UNSPECIFIED: ICD-10-CM

## 2020-01-27 DIAGNOSIS — R11.0 NAUSEA WITHOUT VOMITING: ICD-10-CM

## 2020-01-27 DIAGNOSIS — J02.9 SORE THROAT: ICD-10-CM

## 2020-01-27 DIAGNOSIS — J10.1 INFLUENZA A: Primary | ICD-10-CM

## 2020-01-27 DIAGNOSIS — H92.03 ACUTE OTALGIA, BILATERAL: ICD-10-CM

## 2020-01-27 DIAGNOSIS — R05.9 COUGH: ICD-10-CM

## 2020-01-27 DIAGNOSIS — R68.83 CHILLS: ICD-10-CM

## 2020-01-27 DIAGNOSIS — R52 BODY ACHES: ICD-10-CM

## 2020-01-27 LAB
CTP QC/QA: YES
FLUAV AG NPH QL: POSITIVE
FLUBV AG NPH QL: NEGATIVE

## 2020-01-27 PROCEDURE — 99214 PR OFFICE/OUTPT VISIT, EST, LEVL IV, 30-39 MIN: ICD-10-PCS | Mod: 25,S$GLB,, | Performed by: NURSE PRACTITIONER

## 2020-01-27 PROCEDURE — 87804 POCT INFLUENZA A/B: ICD-10-PCS | Mod: QW,S$GLB,, | Performed by: NURSE PRACTITIONER

## 2020-01-27 PROCEDURE — 87804 INFLUENZA ASSAY W/OPTIC: CPT | Mod: QW,S$GLB,, | Performed by: NURSE PRACTITIONER

## 2020-01-27 PROCEDURE — 99214 OFFICE O/P EST MOD 30 MIN: CPT | Mod: 25,S$GLB,, | Performed by: NURSE PRACTITIONER

## 2020-01-27 RX ORDER — PROMETHAZINE HYDROCHLORIDE AND DEXTROMETHORPHAN HYDROBROMIDE 6.25; 15 MG/5ML; MG/5ML
5 SYRUP ORAL EVERY 6 HOURS PRN
Qty: 100 ML | Refills: 0 | Status: SHIPPED | OUTPATIENT
Start: 2020-01-27 | End: 2020-02-03

## 2020-01-27 RX ORDER — BENZONATATE 100 MG/1
100 CAPSULE ORAL 3 TIMES DAILY PRN
Qty: 20 CAPSULE | Refills: 0 | Status: SHIPPED | OUTPATIENT
Start: 2020-01-27 | End: 2020-02-03

## 2020-01-27 NOTE — PROGRESS NOTES
"Subjective:       Patient ID: Kristina Graham is a 49 y.o. female.    Vitals:  height is 5' 9" (1.753 m). Her temperature is 96.7 °F (35.9 °C). Her blood pressure is 126/76 and her pulse is 91. Her oxygen saturation is 100%.     Chief Complaint: Cough and Sore Throat    Symptoms started four days around fours ago1/23/20.    Cough   The current episode started in the past 7 days. The problem has been gradually worsening. The problem occurs every few minutes. The cough is productive of sputum. Associated symptoms include a sore throat. Pertinent negatives include no chills, ear pain, eye redness, fever, hemoptysis, myalgias, rash, shortness of breath or wheezing. Nothing aggravates the symptoms. She has tried OTC cough suppressant for the symptoms. The treatment provided mild relief.   Sore Throat    The current episode started in the past 7 days. The problem has been gradually worsening. Neither side of throat is experiencing more pain than the other. There has been no fever. The pain is at a severity of 7/10. The pain is moderate. Associated symptoms include congestion and coughing. Pertinent negatives include no ear pain, shortness of breath, stridor or vomiting. She has tried acetaminophen for the symptoms. The treatment provided mild relief.       Constitution: Negative for chills, sweating, fatigue and fever.   HENT: Positive for congestion and sore throat. Negative for ear pain, sinus pain, sinus pressure and voice change.    Neck: Negative for painful lymph nodes.   Eyes: Negative for eye redness.   Respiratory: Positive for cough and sputum production. Negative for chest tightness, bloody sputum, COPD, shortness of breath, stridor, wheezing and asthma.    Gastrointestinal: Negative for nausea and vomiting.   Musculoskeletal: Negative for muscle ache.   Skin: Negative for rash.   Allergic/Immunologic: Negative for seasonal allergies and asthma.   Hematologic/Lymphatic: Negative for swollen lymph nodes.     "   Objective:      Physical Exam   Constitutional: She is oriented to person, place, and time. She appears well-developed and well-nourished. She is cooperative.  Non-toxic appearance. She does not have a sickly appearance. She does not appear ill. No distress.   HENT:   Head: Normocephalic and atraumatic.   Right Ear: Hearing, external ear and ear canal normal. A middle ear effusion is present.   Left Ear: Hearing, external ear and ear canal normal. A middle ear effusion is present.   Nose: Mucosal edema present. No rhinorrhea or nasal deformity. No epistaxis. Right sinus exhibits maxillary sinus tenderness. Right sinus exhibits no frontal sinus tenderness. Left sinus exhibits maxillary sinus tenderness. Left sinus exhibits no frontal sinus tenderness.   Mouth/Throat: Uvula is midline and mucous membranes are normal. No trismus in the jaw. Normal dentition. No uvula swelling. Posterior oropharyngeal erythema present. No oropharyngeal exudate or posterior oropharyngeal edema.   Eyes: Conjunctivae and lids are normal. No scleral icterus.   Neck: Trachea normal, full passive range of motion without pain and phonation normal. Neck supple. No neck rigidity. No edema and no erythema present.   Cardiovascular: Normal rate, regular rhythm, normal heart sounds, intact distal pulses and normal pulses.   Pulmonary/Chest: Effort normal and breath sounds normal. No respiratory distress. She has no decreased breath sounds. She has no rhonchi.   Abdominal: Normal appearance.   Musculoskeletal: Normal range of motion. She exhibits no edema or deformity.   Lymphadenopathy:     She has cervical adenopathy.        Right cervical: Superficial cervical adenopathy present.        Left cervical: Superficial cervical adenopathy present.   Neurological: She is alert and oriented to person, place, and time. She exhibits normal muscle tone. Coordination normal.   Skin: Skin is warm, dry, intact, not diaphoretic and not pale.   Psychiatric:  She has a normal mood and affect. Her speech is normal and behavior is normal. Judgment and thought content normal. Cognition and memory are normal.   Nursing note and vitals reviewed.        Assessment:       1. Influenza A    2. Cough    3. Nausea without vomiting    4. Fever, unspecified    5. Chills    6. Body aches    7. Sore throat    8. Acute otalgia, bilateral        Plan:         Influenza A  -     baloxavir marboxil (XOFLUZA) 20 mg tablet; Take 2 tablets (40 mg total) by mouth once. for 1 dose  Dispense: 2 tablet; Refill: 0    Cough  -     POCT Influenza A/B  -     promethazine-dextromethorphan (PROMETHAZINE-DM) 6.25-15 mg/5 mL Syrp; Take 5 mLs by mouth every 6 (six) hours as needed (May take every 4 hours, PRN. DoNOT take more than 30 mL in 24 hours.).  Dispense: 100 mL; Refill: 0  -     benzonatate (TESSALON) 100 MG capsule; Take 1 capsule (100 mg total) by mouth 3 (three) times daily as needed for Cough.  Dispense: 20 capsule; Refill: 0    Nausea without vomiting    Fever, unspecified    Chills    Body aches    Sore throat  -     diphenhydrAMINE-aluminum-magnesium hydroxide-simethicone-lidocaine HCl 2%; Swish and spit 10 mLs every 4 (four) hours as needed. Swish and swallow for sore throat  Dispense: 100 mL; Refill: 0    Acute otalgia, bilateral         Results for orders placed or performed in visit on 01/27/20   POCT Influenza A/B   Result Value Ref Range    Rapid Influenza A Ag Positive (A) Negative    Rapid Influenza B Ag Negative Negative     Acceptable Yes      Patient Instructions     Always follow your healthcare professional's instructions.    You have received urgent care diagnosis and treatment and you may be released before all of your medical problems are known or treated. Unless you have been given a referral, you (the patient), will arrange for follow-up care as instructed.     Please follow up with your primary care provider within 5-7 days if your signs and symptoms have  not resolved or have worsen.     If your condition worsens or fails to improve, I recommend that you receive another evaluation in the emergency room immediately or contact your primary care office to discuss your concerns.     The Flu (Influenza)     The virus that causes the flu spreads through the air in droplets when someone who has the flu coughs, sneezes, laughs, or talks.     The flu (influenza) is an infection that affects your respiratory tract. This tract is made up of your mouth, nose, and lungs, and the passages between them. Unlike a cold, the flu can make you very ill. And it can lead to pneumonia, a serious lung infection. The flu can have serious complications and even cause death.  You have been given an antiviral today for treatment of your condition.  Please complete the antiviral as directed.     Who is at risk for the flu?  Anyone can get the flu. But you are more likely to become infected if you:  · Have a weakened immune system  · Work in a healthcare setting where you may be exposed to flu germs  · Live or work with someone who has the flu  · Haven't had an annual flu shot    How does the flu spread?  The flu is caused by a virus. The virus spreads through the air in droplets when someone who has the flu coughs, sneezes, laughs, or talks. You can become infected when you inhale these viruses directly. You can also become infected when you touch a surface on which the droplets have landed and then transfer the germs to your eyes, nose, or mouth. Touching used tissues, or sharing utensils, drinking glasses, or a toothbrush from an infected person can expose you to flu viruses, too.    What are the symptoms of the flu?  Flu symptoms tend to come on quickly and may last a few days to a few weeks. They include:   Fever usually higher than 100.4°F  (38°C) and chills   Sore throat and headache   Dry cough   Runny nose   Tiredness and weakness   Muscle aches    Who is at risk for flu  complications?  For some people, the flu can be very serious. The risk for complications is greater for:  · Children younger than age 5  · Adults ages 65 and older  · People with a chronic illness such as diabetes or heart, kidney, or lung disease  People who live in a nursing home or long-term care facility    Easing flu symptoms  · Drink lots of fluids such as water, juice, and warm soup. A good rule is to drink enough so that you urinate your normal amount.  · Get plenty of rest.  · Ask your healthcare provider what to take for fever and pain.  · Call your provider if your fever is 100.4°F (38°C) or higher, or you become dizzy, lightheaded, or short of breath.    Taking steps to protect others  · Wash your hands often, especially after coughing or sneezing. Or clean your hands with an alcohol-based hand  containing at least 60% alcohol.  · Cough or sneeze into a tissue. Then throw the tissue away and wash your hands. If you don't have a tissue, cough and sneeze into your elbow.  · Stay home until at least 24 hours after you no longer have a fever or chills. Be sure the fever isn't being hidden by fever-reducing medicine.  · Don't share food, utensils, drinking glasses, or a toothbrush with others.  · Ask your healthcare provider if others in your household should get antiviral medicine to help them avoid infection.    How can the flu be prevented?  · One of the best ways to avoid the flu is to get a flu vaccine each year. The virus that causes the flu changes from year to year. For that reason, healthcare providers recommend getting the flu vaccine each year, as soon as it's available in your area. The vaccine is given as a shot. Your healthcare provider can tell you which vaccine is right for you. A nasal spray is also available but is not recommended for the 6591-4225 flu season. The CDC says this is because the nasal spray did not seem to protect against the flu over the last several flu seasons. In the  past, it was meant for people ages 2 to 49.  · Wash your hands often. Frequent handwashing is a proven way to help prevent infection.  · Carry an alcohol-based hand gel containing at least 60% alcohol. Use it when you can't use soap and water. Then wash your hands as soon as you can.  · Avoid touching your eyes, nose, and mouth.  · At home and work, clean phones, computer keyboards, and toys often with disinfectant wipes.  · If possible, avoid close contact with others who have the flu or symptoms of the flu.    Handwashing tips  Handwashing is one of the best ways to prevent many common infections. If you are caring for or visiting someone with the flu, wash your hands each time you enter and leave the room. Follow these steps:  · Use warm water and plenty of soap. Rub your hands together well.  · Clean the whole hand, including under your nails, between your fingers, and up the wrists.  · Wash for at least 15 seconds.  · Rinse, letting the water run down your fingers, not up your wrists.  · Dry your hands well. Use a paper towel to turn off the faucet and open the door.    Using alcohol-based hand   Alcohol-based hand  are also a good choice. Use them when you can't use soap and water. Follow these steps:  · Squeeze about a tablespoon of gel into the palm of one hand.  · Rub your hands together briskly, cleaning the backs of your hands, the palms, between your fingers, and up the wrists.  · Rub until the gel is gone and your hands are completely dry.    Preventing the flu in healthcare settings  The flu is a special concern for people in hospitals and long-term care facilities. To help prevent the spread of flu, many hospitals and nursing homes take these steps:  · Healthcare providers wash their hands or use an alcohol-based hand  before and after treating each patient.  · People with the flu have private rooms and bathrooms or share a room with someone with the same infection.  · People  who are at high risk for the flu but don't have it are encouraged to get the flu and pneumonia vaccines.  All healthcare workers are encouraged or required to get flu shots.    Rapid flu testing:    Why wasn't I tested for the flu? Why did I receive medication?  During known influenza outbreaks, most patients with acute febrile illnesses not requiring hospital admission and who are not at increased risk for complications can be diagnosed based on symptoms alone. The rapid test performed in clinic can distinguish between influenza A and B, but not among subtypes. False-negative results are more likely to occur when influenza prevalence is high in the community, which is typically at the peak of the influenza season. When you were tested, your viral load may not have been high enough to detect the flu (too early on or too late on in the course of your illness).    The decision of whether to test a patient is NOT influenced by the patient's influenza vaccination (vaccinations should prevent MORTALITY, they do NOT necessarily prevent disease acquisition).    oseltamivir (TAMIFLU)  Good Bad      Decreases the amount of virus you are carrying  Should shorten the length of your illness and lessen the likeliness of you spreading the virus  Improve the effectiveness of infection prevention and control measures  Helps to prevent epidemics and pandemics     Does not CURE the flu, you will still have symptoms  Will not shorten the length of your illness by much  Side effects can include nausea, vomiting, or diarrhea due to irritation of the GI tract   Decreases in benefit to you if not administered within the first 48 hrs of your illness  Cost of the medication may be expensive     Taking the medication is a personal decision, you need to decide if taking the medication is the right thing for YOU.   You have been given a work/school note for FIVE days, so that you can complete oseltamivir before increasing your exposure to  "others. If you decide not to take the medication, you will still be given a work/school note for FIVE days. I do NOT include the terminology when fever free for 24 hrs when I have confirmed diagnosis of the flu because you may be fever free and still have enough virus to spread.     Symptom management: Despite all of this, your symptoms might persist for as long as TWO weeks.    Fever Cough Body Aches Nausea and Vomiting Diarrhea Congestion or Runny Nose    OTC  Tylenol   OTC  NSAIDs  Tessalon Pearls   Phenergan DM   OTC cough medications  Mobic   OTC  Tylenol   OTC  NSAIDs  Zofran   Phenergan   OTC Emetrol  DO NOT take ant-diarrheal medications  OTC Claritin, Zyrtec, Allegra, OR Xyzal   OTC Flonase   OTC Benadryl      Cough Allergy Symptoms Asthma   Tessalon Perles are a non-narcotic cough medicine. It works by numbing the throat and lungs, making the cough reflex less active, it is used to relieve coughing during the day. If you have been given Phenergan DM cough syrup, you do NOT need to take both medications at the same time.    Phenergan DM is a combination medication that is used to treat cough. Phenergan DM works like an antihistamine and cough suppressant. This medication will make you sleepy like Benadryl, have a drying effect, and act on a part of the brain (cough center) to reduce the need to cough. DO NOT take Benadryl and Phenergan together. Take over-the-counter claritin, zyrtec, allegra, or xyzal as directed, these are antihistamines that will work to dry up secretions/mucus. Antihistamines work to block the effects of a certain natural substance (histamine), which causes allergy symptoms.    Use over the counter Flonase as directed for sinus congestion and postnasal drip. Proper administration is to "look down at your toes and aim for your nose". The goal is to aim for your nasolabial folds, the creases in your nose. If you feel the medication drip down your throat, you have NOT " "administered it correctly. If you can "smell the roses" (floral scent), then you have administered it correctly. If you have a history of asthma, expressed a concern about wheezing or have been told you were wheezing during your exam today, you are being given Albuterol. Albuterol is a bronchodilator that relaxes muscles in the airways and increases air flow to the lungs; it is used to treat or prevent bronchospasm, or narrowing of the airways in the lungs.     If you have a history of asthma, expressed a concern about wheezing or have been told you were wheezing during your exam today and are NOT being prescribed Albuterol that is because you have insured me that you have an adequate supply of the drug at home.        Dehydration (Adult)  Dehydration occurs when your body loses too much fluid. This may be the result of prolonged vomiting or diarrhea, excessive sweating, or a high fever. It may also happen if you don't drink enough fluid when you're sick or out in the heat. Misuse of diuretics (water pills) can also be a cause.  Symptoms include thirst and decreased urine output. You may also feel dizzy, weak, fatigued, or very drowsy. The diet described below is usually enough to treat dehydration. In some cases, you may need medicine.    Home care  · Drink at least 12 8-ounce glasses of fluid every day to resolve the dehydration. Fluid may include water; orange juice; lemonade; apple, grape, or cranberry juice; clear fruit drinks; electrolyte replacement and sports drinks; and teas and coffee without caffeine. If you have been diagnosed with a kidney disease, ask your doctor how much and what types of fluids you should drink to prevent dehydration. If you have kidney disease, fluid can build up in the body. This can be dangerous to your health.  · If you have a fever, muscle aches, or a headache as a result of a cold or flu, you may take acetaminophen or ibuprofen, unless another medicine was prescribed. If you " have chronic liver or kidney disease, or have ever had a stomach ulcer or gastrointestinal bleeding, talk with your health care provider before using these medicines. Don't take aspirin if you are younger than 18 and have a fever. Aspirin raises the chance for severe liver injury.    Why didn't I get a steroid shot or a medrol dose pack?    The benefits are small and, unlike topical glucocorticoids, systemic glucocorticoids possess a significant side effect profile. Major side effects include:     Effects immunity predisposing you to getting a more severe infection and increases your white blood cell count. You have the flu, you do not need anything to weaken your immune system right now.   Skin consequences: Skin thinning and ecchymoses, Cushingoid appearance (rounded face), acne, weight gain, mild hirsutism, facial erythema, and striae.   Eye consequences: Cataracts, increased intraocular pressure, exophthalmos.    Cardiovascular consequences: Fluid retention, premature atherosclerotic disease, and arrhythmias.    GI consequences: Increased risk for adverse gastrointestinal effects, such as gastritis, ulcer formation, and gastrointestinal bleeding   Bone and muscle consequences: Osteoporosis, osteonecrosis, and myopathy.   Neuropsychiatric effects: Mood disorders, psychosis, memory impairment, and    Metabolic and Endocrine consequences: Suppress the hypothalamic-pituitary-adrenal (HPA) axis and increase blood sugar.   Young children -- Growth impairment        Can I have a shot instead of pills?  No. I have diagnosed you with influenza and I want you to have a FULL course of antivirals. An antibiotic shot will not help you because viruses do not have cell walls and this is how antibiotics work. I have discussed above why you did not receive a steroid shot.                                                                                    When to seek medical advice  DEHYDRATION:  · Continued  vomiting  · Frequent diarrhea (more than 5 times a day); blood (red or black color) or mucus in diarrhea  · Blood in vomit or stool  · Swollen abdomen or increasing abdominal pain  · Weakness, dizziness, or fainting  · Unusual drowsiness or confusion  · Reduced urine output or extreme thirst  FEVER:  Call your healthcare provider right away if any of these occur:  · Your child is 3 months old or younger and has a fever of 100.4°F (38°C) or higher. Get medical care right away. Fever in a young baby can be a sign of a dangerous infection.  · Your child is of any age and has repeated fevers above 104°F (40°C).  · Your child is younger than 2 years of age and a fever of 100.4°F (38°C) continues for more than 1 day.  · A fever of 100.4°F (38°C) for more than 3 days in anyone older than 2 years of age.       Your provider discussed your plan of care with you during your physical exam. It was reviewed once more by the provider when giving you an after visit summary. If the patient is a minor, the discharge instructions were discussed with an adult and that adult acknowledge their understanding of the provider's teaching.

## 2020-01-27 NOTE — LETTER
January 27, 2020      [facility]  [address] [address], [address]  [place] [address]  Phone: [phone]  Fax: [phone]       Patient: Kristina Graham   YOB: 1970  Date of Visit: 01/27/2020    To Whom It May Concern:    Estefany Graham  was at [facility] on 01/27/2020. She may return to work/school on 1/30/20 with no restrictions. If you have any questions or concerns, or if I can be of further assistance, please do not hesitate to contact me.    Sincerely,    Allison Cortez NP

## 2020-01-27 NOTE — PATIENT INSTRUCTIONS

## 2020-01-30 ENCOUNTER — TELEPHONE (OUTPATIENT)
Dept: URGENT CARE | Facility: CLINIC | Age: 50
End: 2020-01-30

## 2020-05-16 ENCOUNTER — PATIENT MESSAGE (OUTPATIENT)
Dept: RHEUMATOLOGY | Facility: CLINIC | Age: 50
End: 2020-05-16

## 2020-05-22 ENCOUNTER — PATIENT MESSAGE (OUTPATIENT)
Dept: RHEUMATOLOGY | Facility: CLINIC | Age: 50
End: 2020-05-22

## 2020-07-09 ENCOUNTER — OFFICE VISIT (OUTPATIENT)
Dept: RHEUMATOLOGY | Facility: CLINIC | Age: 50
End: 2020-07-09
Payer: COMMERCIAL

## 2020-07-09 VITALS — TEMPERATURE: 97 F | HEART RATE: 73 BPM | SYSTOLIC BLOOD PRESSURE: 118 MMHG | DIASTOLIC BLOOD PRESSURE: 80 MMHG

## 2020-07-09 DIAGNOSIS — E55.9 VITAMIN D DEFICIENCY: ICD-10-CM

## 2020-07-09 DIAGNOSIS — M25.649 STIFFNESS OF HAND JOINT, UNSPECIFIED LATERALITY: Primary | ICD-10-CM

## 2020-07-09 DIAGNOSIS — E83.51 HYPOCALCEMIA: ICD-10-CM

## 2020-07-09 DIAGNOSIS — R53.83 FATIGUE, UNSPECIFIED TYPE: ICD-10-CM

## 2020-07-09 DIAGNOSIS — R76.8 RHEUMATOID FACTOR POSITIVE: ICD-10-CM

## 2020-07-09 PROCEDURE — 99999 PR PBB SHADOW E&M-EST. PATIENT-LVL III: CPT | Mod: PBBFAC,,, | Performed by: INTERNAL MEDICINE

## 2020-07-09 PROCEDURE — 99214 OFFICE O/P EST MOD 30 MIN: CPT | Mod: S$GLB,,, | Performed by: INTERNAL MEDICINE

## 2020-07-09 PROCEDURE — 99214 PR OFFICE/OUTPT VISIT, EST, LEVL IV, 30-39 MIN: ICD-10-PCS | Mod: S$GLB,,, | Performed by: INTERNAL MEDICINE

## 2020-07-09 PROCEDURE — 99999 PR PBB SHADOW E&M-EST. PATIENT-LVL III: ICD-10-PCS | Mod: PBBFAC,,, | Performed by: INTERNAL MEDICINE

## 2020-07-09 NOTE — PROGRESS NOTES
Rapid3 Question Responses and Scores 7/6/2020   MDHAQ Score 0.1   Psychologic Score 0   Pain Score 2   When you awakened in the morning OVER THE LAST WEEK, did you feel stiff? Yes   If Yes, please indicate the number of hours until you are as limber as you will be for the day 1   Fatigue Score 4   Global Health Score 2   RAPID3 Score 1.44         Answers for HPI/ROS submitted by the patient on 7/6/2020   fever: No  eye redness: No  headaches: No  shortness of breath: No  chest pain: No  trouble swallowing: No  diarrhea: No  constipation: No  unexpected weight change: No  genital sore: No  dysuria: No  During the last 3 days, have you had a skin rash?: No  Bruises or bleeds easily: No  cough: No

## 2020-07-09 NOTE — PROGRESS NOTES
Subjective:       Patient ID: Kristina Graham is a 49 y.o. female.    Chief Complaint: Hand stiffness    HPI:  Kristina Graham is a 49 y.o. female with  history of positive RF and joint pain.  No treatment given. Hands ache intermittently but not as bad.  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      No pain today. Intermittenly taks Lodine.   Morning stiffness for 20-30 minutes.        Left peroneal nerve compression on EMG for left toe numbness in 2011 patient declined surgery.     History of B12 deficiency, vitamin D deficiency and acne treated with spironolactone.  In 2010 Parvovirus and EBV infections. IgM positive for both.           Due to hypoparathyroid medication being Natpara taken off the market she developed hypocalcemia.  Parathyroid knicked after total thyroidectomy 2/2017.  Episode of right knee pain and swelling.    Takes Calcitriol 0.5 mg bid placed due to Natpara being taken off market.        Review of Systems   Constitutional: Negative for fever and unexpected weight change.   HENT: Negative for trouble swallowing.    Eyes: Negative for redness.   Respiratory: Negative for cough and shortness of breath.    Cardiovascular: Negative for chest pain.   Gastrointestinal: Negative for constipation and diarrhea.   Genitourinary: Negative for dysuria and genital sores.   Musculoskeletal: Negative for arthralgias.   Skin: Negative for rash.   Neurological: Negative for headaches.   Hematological: Does not bruise/bleed easily.   Psychiatric/Behavioral: Negative.          Objective:   /80 (BP Location: Right arm)   Pulse 73   Temp 97.3 °F (36.3 °C)   LMP 06/26/2020      Physical Exam   Constitutional: She is oriented to person, place, and time and well-developed, well-nourished, and in no distress.   HENT:   Head: Normocephalic and atraumatic.   Eyes: Conjunctivae and EOM are normal.   Neck: Neck supple.   Cardiovascular: Normal  rate, regular rhythm and normal heart sounds.    Pulmonary/Chest: Effort normal and breath sounds normal.   Abdominal: Soft. Bowel sounds are normal.   Neurological: She is alert and oriented to person, place, and time. Gait normal.   Skin: Skin is warm and dry.     Psychiatric: Mood and affect normal.   Musculoskeletal:      Comments: 28 joint count: 0 tender and 0 swollen           LABS    Component      Latest Ref Rng & Units 5/7/2020 1/28/2020 12/16/2019   WBC      3.90 - 12.70 K/uL  11.87    RBC      4.00 - 5.40 M/uL  4.92    Hemoglobin      12.0 - 16.0 g/dL  14.8    Hematocrit      37.0 - 48.5 %  43.5    MCV      82 - 98 fL  88    MCH      27.0 - 31.0 pg  30.1    MCHC      32.0 - 36.0 g/dL  34.0    RDW      11.5 - 14.5 %  11.9    Platelets      150 - 350 K/uL  247    MPV      9.2 - 12.9 fL  9.7    Immature Granulocytes      0.0 - 0.5 %  0.4    Gran # (ANC)      1.8 - 7.7 K/uL  9.1 (H)    Immature Grans (Abs)      0.00 - 0.04 K/uL  0.05 (H)    Lymph #      1.0 - 4.8 K/uL  1.7    Mono #      0.3 - 1.0 K/uL  0.9    Eos #      0.0 - 0.5 K/uL  0.1    Baso #      0.00 - 0.20 K/uL  0.04    nRBC      0 /100 WBC  0    Gran%      38.0 - 73.0 %  76.6 (H)    Lymph%      18.0 - 48.0 %  14.3 (L)    Mono%      4.0 - 15.0 %  7.4    Eosinophil%      0.0 - 8.0 %  1.0    Basophil%      0.0 - 1.9 %  0.3    Differential Method        Automated    Sodium      136 - 145 mmol/L  141    Potassium      3.5 - 5.1 mmol/L  3.7    Chloride      95 - 110 mmol/L  103    CO2      23 - 29 mmol/L  23    Glucose      70 - 110 mg/dL  139 (H)    BUN, Bld      7 - 17 mg/dL  8    Creatinine      0.50 - 1.40 mg/dL  0.66    Calcium      8.7 - 10.5 mg/dL  8.2 (L)    PROTEIN TOTAL      6.0 - 8.4 g/dL  8.5 (H)    Albumin      3.5 - 5.2 g/dL  4.5    BILIRUBIN TOTAL      0.1 - 1.0 mg/dL  0.3    Alkaline Phosphatase      38 - 126 U/L  91    AST      15 - 46 U/L  25    ALT      10 - 44 U/L  19    Anion Gap      8 - 16 mmol/L  15    eGFR if African American       >60 mL/min/1.73 m:2  >60.0    eGFR if non African American      >60 mL/min/1.73 m:2  >60.0    Specimen UA        Urine, Clean Catch    Color, UA      Yellow, Straw, Anay  Yellow    Appearance, UA      Clear  Clear    pH, UA      5.0 - 8.0  8.0    Specific Gravity, UA      1.005 - 1.030  1.020    Protein, UA      Negative  Negative    Glucose, UA      Negative  Negative    Ketones, UA      Negative  2+ (A)    Bilirubin (UA)      Negative  Negative    Occult Blood UA      Negative  Negative    NITRITE UA      Negative  Negative    UROBILINOGEN UA      <2.0 EU/dL  Negative    Leukocytes, UA      Negative  Negative    CRP      0.0 - 8.2 mg/L   1.7   Sed Rate      0 - 36 mm/Hr   15   Lipase Result      23 - 300 U/L  71    Preg Test, Ur        Negative    SARS-CoV-2 RNA, Amplification, Qual      Negative Negative          Assessment:       1. Joint pain/swelling/stiffness.  Currently painfree.  May be influenced by hypocalcemia  2. S/p Thyroidectomy complicated by hypoparathyroidism and hypocalcemia.  No longer on Natpara.  Taking Calcitriol.  3. Positive rheumatoid factor  4. Fatigue   5. Overweight.   6. Vitamin D deficiency.  Currently on OTC 2,000 units daily.  7. History vitamin B12 deficiency      Plan:       1. Labs   2. Continue Lodine  3. RTO 6 months/prn

## 2020-07-10 ENCOUNTER — PATIENT MESSAGE (OUTPATIENT)
Dept: RHEUMATOLOGY | Facility: CLINIC | Age: 50
End: 2020-07-10

## 2020-09-15 ENCOUNTER — OFFICE VISIT (OUTPATIENT)
Dept: RHEUMATOLOGY | Facility: CLINIC | Age: 50
End: 2020-09-15
Payer: COMMERCIAL

## 2020-09-15 VITALS
HEART RATE: 81 BPM | WEIGHT: 187.81 LBS | DIASTOLIC BLOOD PRESSURE: 79 MMHG | SYSTOLIC BLOOD PRESSURE: 133 MMHG | BODY MASS INDEX: 27.82 KG/M2 | HEIGHT: 69 IN | TEMPERATURE: 99 F

## 2020-09-15 DIAGNOSIS — R76.8 RHEUMATOID FACTOR POSITIVE: Primary | ICD-10-CM

## 2020-09-15 DIAGNOSIS — E55.9 VITAMIN D DEFICIENCY: ICD-10-CM

## 2020-09-15 DIAGNOSIS — M25.649 STIFFNESS OF HAND JOINT, UNSPECIFIED LATERALITY: ICD-10-CM

## 2020-09-15 DIAGNOSIS — M25.561 PAIN IN BOTH KNEES, UNSPECIFIED CHRONICITY: ICD-10-CM

## 2020-09-15 DIAGNOSIS — M54.2 NECK PAIN: ICD-10-CM

## 2020-09-15 DIAGNOSIS — M25.562 PAIN IN BOTH KNEES, UNSPECIFIED CHRONICITY: ICD-10-CM

## 2020-09-15 DIAGNOSIS — R53.83 FATIGUE, UNSPECIFIED TYPE: ICD-10-CM

## 2020-09-15 PROCEDURE — 99999 PR PBB SHADOW E&M-EST. PATIENT-LVL III: ICD-10-PCS | Mod: PBBFAC,,, | Performed by: INTERNAL MEDICINE

## 2020-09-15 PROCEDURE — 3008F PR BODY MASS INDEX (BMI) DOCUMENTED: ICD-10-PCS | Mod: CPTII,S$GLB,, | Performed by: INTERNAL MEDICINE

## 2020-09-15 PROCEDURE — 3008F BODY MASS INDEX DOCD: CPT | Mod: CPTII,S$GLB,, | Performed by: INTERNAL MEDICINE

## 2020-09-15 PROCEDURE — 99214 OFFICE O/P EST MOD 30 MIN: CPT | Mod: S$GLB,,, | Performed by: INTERNAL MEDICINE

## 2020-09-15 PROCEDURE — 99214 PR OFFICE/OUTPT VISIT, EST, LEVL IV, 30-39 MIN: ICD-10-PCS | Mod: S$GLB,,, | Performed by: INTERNAL MEDICINE

## 2020-09-15 PROCEDURE — 99999 PR PBB SHADOW E&M-EST. PATIENT-LVL III: CPT | Mod: PBBFAC,,, | Performed by: INTERNAL MEDICINE

## 2020-09-15 NOTE — PROGRESS NOTES
"Subjective:       Patient ID: Kristina Graham is a 49 y.o. female.    Chief Complaint: Hand stiffness    HPI:  Kristina Graham is a 49 y.o. female with  history of positive RF and joint pain.  No treatment given. Hands ache intermittently but not as bad.  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      Significant pain and stiffness in neck and knees for one month. 6-7/10 ache that improves with Lodine and worsens with activity.   Every other day takes Lodine.   Morning stiffness for 45 minutes.    Fatigued all day.  Episode of right knee pain and swelling.        Left peroneal nerve compression on EMG for left toe numbness in 2011 patient declined surgery.     History of B12 deficiency, vitamin D deficiency and acne treated with spironolactone.  In 2010 Parvovirus and EBV infections. IgM positive for both.           Due to hypoparathyroid medication being Natpara taken off the market she developed hypocalcemia.  Parathyroid knicked after total thyroidectomy 2/2017.         Review of Systems   Constitutional: Negative for fever and unexpected weight change.   HENT: Negative for trouble swallowing.    Eyes: Negative for redness.   Respiratory: Negative for cough and shortness of breath.    Cardiovascular: Negative for chest pain.   Gastrointestinal: Negative for constipation and diarrhea.   Genitourinary: Negative for dysuria and genital sores.   Musculoskeletal: Negative for arthralgias.   Skin: Negative for rash.   Neurological: Negative for headaches.   Hematological: Does not bruise/bleed easily.   Psychiatric/Behavioral: Negative.          Objective:   /79 (BP Location: Left arm, Patient Position: Sitting, BP Method: Medium (Automatic))   Pulse 81   Temp 98.7 °F (37.1 °C) (Oral)   Ht 5' 9" (1.753 m)   Wt 85.2 kg (187 lb 13.3 oz)   BMI 27.74 kg/m²      Physical Exam   Constitutional: She is oriented to person, place, and time and " well-developed, well-nourished, and in no distress.   HENT:   Head: Normocephalic and atraumatic.   Eyes: Conjunctivae and EOM are normal.   Neck: Neck supple.   Cardiovascular: Normal rate, regular rhythm and normal heart sounds.    Pulmonary/Chest: Effort normal and breath sounds normal.   Abdominal: Soft. Bowel sounds are normal.   Neurological: She is alert and oriented to person, place, and time. Gait normal.   Skin: Skin is warm and dry.     Psychiatric: Mood and affect normal.   Musculoskeletal:      Comments: 28 joint count: 1 tender (right knee anterior medial aspect) and 0 swollen    Pain on elevation of neck; FROM of neck           LABS    Component      Latest Ref Rng & Units 7/9/2020   WBC      3.90 - 12.70 K/uL 10.46   RBC      4.00 - 5.40 M/uL 4.76   Hemoglobin      12.0 - 16.0 g/dL 14.4   Hematocrit      37.0 - 48.5 % 44.2   MCV      82 - 98 fL 93   MCH      27.0 - 31.0 pg 30.3   MCHC      32.0 - 36.0 g/dL 32.6   RDW      11.5 - 14.5 % 12.3   Platelets      150 - 350 K/uL 267   MPV      9.2 - 12.9 fL 9.3   Immature Granulocytes      0.0 - 0.5 % 0.9 (H)   Gran # (ANC)      1.8 - 7.7 K/uL 6.6   Immature Grans (Abs)      0.00 - 0.04 K/uL 0.09 (H)   Lymph #      1.0 - 4.8 K/uL 2.9   Mono #      0.3 - 1.0 K/uL 0.7   Eos #      0.0 - 0.5 K/uL 0.2   Baso #      0.00 - 0.20 K/uL 0.07   nRBC      0 /100 WBC 0   Gran%      38.0 - 73.0 % 62.8   Lymph%      18.0 - 48.0 % 27.2   Mono%      4.0 - 15.0 % 7.0   Eosinophil%      0.0 - 8.0 % 1.4   Basophil%      0.0 - 1.9 % 0.7   Differential Method       Automated   Sodium      136 - 145 mmol/L 139   Potassium      3.5 - 5.1 mmol/L 4.2   Chloride      95 - 110 mmol/L 102   CO2      23 - 29 mmol/L 29   Glucose      70 - 110 mg/dL 96   BUN, Bld      6 - 20 mg/dL 9   Creatinine      0.5 - 1.4 mg/dL 0.8   Calcium      8.7 - 10.5 mg/dL 8.8   PROTEIN TOTAL      6.0 - 8.4 g/dL 7.8   Albumin      3.5 - 5.2 g/dL 4.0   BILIRUBIN TOTAL      0.1 - 1.0 mg/dL 0.4   Alkaline  Phosphatase      55 - 135 U/L 58   AST      10 - 40 U/L 16   ALT      10 - 44 U/L 17   Anion Gap      8 - 16 mmol/L 8   eGFR if African American      >60 mL/min/1.73 m:2 >60.0   eGFR if non African American      >60 mL/min/1.73 m:2 >60.0   Sed Rate      0 - 36 mm/Hr 9   CRP      0.0 - 8.2 mg/L 1.1   Vit D, 25-Hydroxy      30 - 96 ng/mL 27 (L)   PTH      9.0 - 77.0 pg/mL 6.0 (L)   TSH      0.400 - 4.000 uIU/mL 0.307 (L)   Free T4      0.71 - 1.51 ng/dL 0.74        Assessment:       1. Joint pain/swelling/stiffness.  Currently with intermittent pain in neck and knees.  May be influenced by hypocalcemia  2. S/p Thyroidectomy complicated by hypoparathyroidism and hypocalcemia.  No longer on Natpara.  Taking Calcitriol.  3. Positive rheumatoid factor  4. Fatigue   5. Overweight.   6. Vitamin D deficiency.  Currently on OTC 2,000 units daily.  7. History vitamin B12 deficiency      Plan:       1. Labs   2. Increase to daily Lodine  3. RTO 6 months/prn

## 2020-09-16 ENCOUNTER — PATIENT MESSAGE (OUTPATIENT)
Dept: RHEUMATOLOGY | Facility: CLINIC | Age: 50
End: 2020-09-16

## 2020-12-16 ENCOUNTER — OFFICE VISIT (OUTPATIENT)
Dept: OTOLARYNGOLOGY | Facility: CLINIC | Age: 50
End: 2020-12-16
Payer: COMMERCIAL

## 2020-12-16 DIAGNOSIS — H61.23 BILATERAL IMPACTED CERUMEN: Primary | ICD-10-CM

## 2020-12-16 PROCEDURE — 1126F PR PAIN SEVERITY QUANTIFIED, NO PAIN PRESENT: ICD-10-PCS | Mod: S$GLB,,, | Performed by: NURSE PRACTITIONER

## 2020-12-16 PROCEDURE — 99499 NO LOS: ICD-10-PCS | Mod: S$GLB,,, | Performed by: NURSE PRACTITIONER

## 2020-12-16 PROCEDURE — 99999 PR PBB SHADOW E&M-EST. PATIENT-LVL II: CPT | Mod: PBBFAC,,, | Performed by: NURSE PRACTITIONER

## 2020-12-16 PROCEDURE — 69210 REMOVE IMPACTED EAR WAX UNI: CPT | Mod: S$GLB,,, | Performed by: NURSE PRACTITIONER

## 2020-12-16 PROCEDURE — 99999 PR PBB SHADOW E&M-EST. PATIENT-LVL II: ICD-10-PCS | Mod: PBBFAC,,, | Performed by: NURSE PRACTITIONER

## 2020-12-16 PROCEDURE — 1126F AMNT PAIN NOTED NONE PRSNT: CPT | Mod: S$GLB,,, | Performed by: NURSE PRACTITIONER

## 2020-12-16 PROCEDURE — 69210 EAR CERUMEN REMOVAL: ICD-10-PCS | Mod: S$GLB,,, | Performed by: NURSE PRACTITIONER

## 2020-12-16 PROCEDURE — 99499 UNLISTED E&M SERVICE: CPT | Mod: S$GLB,,, | Performed by: NURSE PRACTITIONER

## 2020-12-16 NOTE — PROCEDURES
Ear Cerumen Removal    Date/Time: 12/16/2020 2:30 PM  Performed by: Benjamín Yarbrough NP  Authorized by: Benjamín Yarbrough NP     Consent Done?:  Yes (Verbal)  Location details:  Both ears  Procedure type: curette    Cerumen  Removal Results:  Cerumen completely removed  Patient tolerance:  Patient tolerated the procedure well with no immediate complications     Procedure Note:    The patient was brought to the minor procedure room and placed under the operating microscope of the left ear canal which was cleaned of ceruminous debris. Using a combination of suction, curettes and cup forceps the patient's cerumen impaction was removed. The tympanic membrane was evaluated and was unremarkable. The patient tolerated the procedure well. There were no complications.  Procedure Note:    Patient was brought to the minor procedure room and using the operating microscope of the right ear canal which was cleaned of ceruminous debris. There was a significant cerumen impaction.  Using a combination of suction, curettes and cup forceps the patient's cerumen impaction was removed. Tympanic membrane intact. Pt tolerated well. There were no complications.

## 2021-02-23 ENCOUNTER — PATIENT MESSAGE (OUTPATIENT)
Dept: RHEUMATOLOGY | Facility: CLINIC | Age: 51
End: 2021-02-23

## 2021-03-26 ENCOUNTER — IMMUNIZATION (OUTPATIENT)
Dept: PRIMARY CARE CLINIC | Facility: CLINIC | Age: 51
End: 2021-03-26
Payer: COMMERCIAL

## 2021-03-26 DIAGNOSIS — Z23 NEED FOR VACCINATION: Primary | ICD-10-CM

## 2021-03-26 PROCEDURE — 91300 PR SARS-COV- 2 COVID-19 VACCINE, NO PRSV, 30MCG/0.3ML, IM: ICD-10-PCS | Mod: S$GLB,,, | Performed by: INTERNAL MEDICINE

## 2021-03-26 PROCEDURE — 0001A PR IMMUNIZ ADMIN, SARS-COV-2 COVID-19 VACC, 30MCG/0.3ML, 1ST DOSE: CPT | Mod: CV19,S$GLB,, | Performed by: INTERNAL MEDICINE

## 2021-03-26 PROCEDURE — 91300 PR SARS-COV- 2 COVID-19 VACCINE, NO PRSV, 30MCG/0.3ML, IM: CPT | Mod: S$GLB,,, | Performed by: INTERNAL MEDICINE

## 2021-03-26 PROCEDURE — 0001A PR IMMUNIZ ADMIN, SARS-COV-2 COVID-19 VACC, 30MCG/0.3ML, 1ST DOSE: ICD-10-PCS | Mod: CV19,S$GLB,, | Performed by: INTERNAL MEDICINE

## 2021-03-26 RX ADMIN — Medication 0.3 ML: at 03:03

## 2021-03-30 ENCOUNTER — OFFICE VISIT (OUTPATIENT)
Dept: RHEUMATOLOGY | Facility: CLINIC | Age: 51
End: 2021-03-30
Payer: COMMERCIAL

## 2021-03-30 DIAGNOSIS — R53.83 FATIGUE, UNSPECIFIED TYPE: ICD-10-CM

## 2021-03-30 DIAGNOSIS — M25.649 STIFFNESS OF HAND JOINT, UNSPECIFIED LATERALITY: ICD-10-CM

## 2021-03-30 DIAGNOSIS — R76.8 RHEUMATOID FACTOR POSITIVE: ICD-10-CM

## 2021-03-30 DIAGNOSIS — M25.562 PAIN IN BOTH KNEES, UNSPECIFIED CHRONICITY: Primary | ICD-10-CM

## 2021-03-30 DIAGNOSIS — M25.561 PAIN IN BOTH KNEES, UNSPECIFIED CHRONICITY: Primary | ICD-10-CM

## 2021-03-30 PROCEDURE — 99214 PR OFFICE/OUTPT VISIT, EST, LEVL IV, 30-39 MIN: ICD-10-PCS | Mod: 95,,, | Performed by: INTERNAL MEDICINE

## 2021-03-30 PROCEDURE — 99214 OFFICE O/P EST MOD 30 MIN: CPT | Mod: 95,,, | Performed by: INTERNAL MEDICINE

## 2021-03-30 RX ORDER — LEVOTHYROXINE SODIUM 13 UG/1
1 CAPSULE ORAL DAILY
COMMUNITY
Start: 2021-01-25 | End: 2022-08-04

## 2021-04-18 ENCOUNTER — IMMUNIZATION (OUTPATIENT)
Dept: PRIMARY CARE CLINIC | Facility: CLINIC | Age: 51
End: 2021-04-18
Payer: COMMERCIAL

## 2021-04-18 DIAGNOSIS — Z23 NEED FOR VACCINATION: Primary | ICD-10-CM

## 2021-04-18 PROCEDURE — 91300 PR SARS-COV- 2 COVID-19 VACCINE, NO PRSV, 30MCG/0.3ML, IM: ICD-10-PCS | Mod: S$GLB,,, | Performed by: INTERNAL MEDICINE

## 2021-04-18 PROCEDURE — 91300 PR SARS-COV- 2 COVID-19 VACCINE, NO PRSV, 30MCG/0.3ML, IM: CPT | Mod: S$GLB,,, | Performed by: INTERNAL MEDICINE

## 2021-04-18 PROCEDURE — 0002A PR IMMUNIZ ADMIN, SARS-COV-2 COVID-19 VACC, 30MCG/0.3ML, 2ND DOSE: CPT | Mod: CV19,S$GLB,, | Performed by: INTERNAL MEDICINE

## 2021-04-18 PROCEDURE — 0002A PR IMMUNIZ ADMIN, SARS-COV-2 COVID-19 VACC, 30MCG/0.3ML, 2ND DOSE: ICD-10-PCS | Mod: CV19,S$GLB,, | Performed by: INTERNAL MEDICINE

## 2021-04-18 RX ADMIN — Medication 0.3 ML: at 11:04

## 2021-05-15 LAB
ALBUMIN SERPL-MCNC: 4.6 G/DL (ref 3.8–4.8)
ALBUMIN/GLOB SERPL: 1.4 {RATIO} (ref 1.2–2.2)
ALP SERPL-CCNC: 65 IU/L (ref 39–117)
ALT SERPL-CCNC: 9 IU/L (ref 0–32)
AST SERPL-CCNC: 14 IU/L (ref 0–40)
BASOPHILS # BLD AUTO: 0.1 X10E3/UL (ref 0–0.2)
BASOPHILS NFR BLD AUTO: 1 %
BILIRUB SERPL-MCNC: 0.5 MG/DL (ref 0–1.2)
BUN SERPL-MCNC: 14 MG/DL (ref 6–24)
BUN/CREAT SERPL: 16 (ref 9–23)
CALCIUM SERPL-MCNC: 8.8 MG/DL (ref 8.7–10.2)
CHLORIDE SERPL-SCNC: 101 MMOL/L (ref 96–106)
CO2 SERPL-SCNC: 20 MMOL/L (ref 20–29)
CREAT SERPL-MCNC: 0.86 MG/DL (ref 0.57–1)
CRP SERPL-MCNC: 2 MG/L (ref 0–10)
EOSINOPHIL # BLD AUTO: 0.1 X10E3/UL (ref 0–0.4)
EOSINOPHIL NFR BLD AUTO: 1 %
ERYTHROCYTE [DISTWIDTH] IN BLOOD BY AUTOMATED COUNT: 12.2 % (ref 11.7–15.4)
ERYTHROCYTE [SEDIMENTATION RATE] IN BLOOD BY WESTERGREN METHOD: 8 MM/HR (ref 0–40)
GLOBULIN SER CALC-MCNC: 3.2 G/DL (ref 1.5–4.5)
GLUCOSE SERPL-MCNC: 91 MG/DL (ref 65–99)
HCT VFR BLD AUTO: 46.7 % (ref 34–46.6)
HGB BLD-MCNC: 15.1 G/DL (ref 11.1–15.9)
IMM GRANULOCYTES # BLD AUTO: 0 X10E3/UL (ref 0–0.1)
IMM GRANULOCYTES NFR BLD AUTO: 0 %
LYMPHOCYTES # BLD AUTO: 2.3 X10E3/UL (ref 0.7–3.1)
LYMPHOCYTES NFR BLD AUTO: 28 %
MCH RBC QN AUTO: 29.3 PG (ref 26.6–33)
MCHC RBC AUTO-ENTMCNC: 32.3 G/DL (ref 31.5–35.7)
MCV RBC AUTO: 91 FL (ref 79–97)
MONOCYTES # BLD AUTO: 0.6 X10E3/UL (ref 0.1–0.9)
MONOCYTES NFR BLD AUTO: 8 %
NEUTROPHILS # BLD AUTO: 5 X10E3/UL (ref 1.4–7)
NEUTROPHILS NFR BLD AUTO: 62 %
PLATELET # BLD AUTO: 322 X10E3/UL (ref 150–450)
POTASSIUM SERPL-SCNC: 4.5 MMOL/L (ref 3.5–5.2)
PROT SERPL-MCNC: 7.8 G/DL (ref 6–8.5)
RBC # BLD AUTO: 5.16 X10E6/UL (ref 3.77–5.28)
SODIUM SERPL-SCNC: 140 MMOL/L (ref 134–144)
WBC # BLD AUTO: 8.2 X10E3/UL (ref 3.4–10.8)

## 2021-05-17 ENCOUNTER — PATIENT MESSAGE (OUTPATIENT)
Dept: RHEUMATOLOGY | Facility: CLINIC | Age: 51
End: 2021-05-17

## 2021-09-20 ENCOUNTER — HOSPITAL ENCOUNTER (OUTPATIENT)
Dept: RADIOLOGY | Facility: HOSPITAL | Age: 51
Discharge: HOME | End: 2021-09-20
Attending: INTERNAL MEDICINE
Payer: COMMERCIAL

## 2021-09-20 ENCOUNTER — OFFICE VISIT (OUTPATIENT)
Dept: RHEUMATOLOGY | Facility: CLINIC | Age: 51
End: 2021-09-20
Payer: COMMERCIAL

## 2021-09-20 VITALS
HEIGHT: 69 IN | DIASTOLIC BLOOD PRESSURE: 69 MMHG | HEART RATE: 78 BPM | BODY MASS INDEX: 24.36 KG/M2 | SYSTOLIC BLOOD PRESSURE: 112 MMHG | WEIGHT: 164.44 LBS

## 2021-09-20 DIAGNOSIS — M79.645 BILATERAL THUMB PAIN: ICD-10-CM

## 2021-09-20 DIAGNOSIS — M79.671 RIGHT FOOT PAIN: ICD-10-CM

## 2021-09-20 DIAGNOSIS — R76.8 RHEUMATOID FACTOR POSITIVE: ICD-10-CM

## 2021-09-20 DIAGNOSIS — M79.671 RIGHT FOOT PAIN: Primary | ICD-10-CM

## 2021-09-20 DIAGNOSIS — M25.571 CHRONIC PAIN OF BOTH ANKLES: ICD-10-CM

## 2021-09-20 DIAGNOSIS — R53.83 FATIGUE, UNSPECIFIED TYPE: ICD-10-CM

## 2021-09-20 DIAGNOSIS — M79.644 BILATERAL THUMB PAIN: ICD-10-CM

## 2021-09-20 DIAGNOSIS — M25.649 STIFFNESS OF HAND JOINT, UNSPECIFIED LATERALITY: ICD-10-CM

## 2021-09-20 DIAGNOSIS — G89.29 CHRONIC PAIN OF BOTH ANKLES: ICD-10-CM

## 2021-09-20 DIAGNOSIS — M25.572 CHRONIC PAIN OF BOTH ANKLES: ICD-10-CM

## 2021-09-20 PROCEDURE — 1160F PR REVIEW ALL MEDS BY PRESCRIBER/CLIN PHARMACIST DOCUMENTED: ICD-10-PCS | Mod: CPTII,S$GLB,, | Performed by: INTERNAL MEDICINE

## 2021-09-20 PROCEDURE — 1159F MED LIST DOCD IN RCRD: CPT | Mod: CPTII,S$GLB,, | Performed by: INTERNAL MEDICINE

## 2021-09-20 PROCEDURE — 1160F RVW MEDS BY RX/DR IN RCRD: CPT | Mod: CPTII,S$GLB,, | Performed by: INTERNAL MEDICINE

## 2021-09-20 PROCEDURE — 3008F BODY MASS INDEX DOCD: CPT | Mod: CPTII,S$GLB,, | Performed by: INTERNAL MEDICINE

## 2021-09-20 PROCEDURE — 3074F SYST BP LT 130 MM HG: CPT | Mod: CPTII,S$GLB,, | Performed by: INTERNAL MEDICINE

## 2021-09-20 PROCEDURE — 3078F DIAST BP <80 MM HG: CPT | Mod: CPTII,S$GLB,, | Performed by: INTERNAL MEDICINE

## 2021-09-20 PROCEDURE — 99999 PR PBB SHADOW E&M-EST. PATIENT-LVL III: ICD-10-PCS | Mod: PBBFAC,,, | Performed by: INTERNAL MEDICINE

## 2021-09-20 PROCEDURE — 73630 XR FOOT COMPLETE 3 VIEW RIGHT: ICD-10-PCS | Mod: 26,RT,, | Performed by: RADIOLOGY

## 2021-09-20 PROCEDURE — 3008F PR BODY MASS INDEX (BMI) DOCUMENTED: ICD-10-PCS | Mod: CPTII,S$GLB,, | Performed by: INTERNAL MEDICINE

## 2021-09-20 PROCEDURE — 3074F PR MOST RECENT SYSTOLIC BLOOD PRESSURE < 130 MM HG: ICD-10-PCS | Mod: CPTII,S$GLB,, | Performed by: INTERNAL MEDICINE

## 2021-09-20 PROCEDURE — 3078F PR MOST RECENT DIASTOLIC BLOOD PRESSURE < 80 MM HG: ICD-10-PCS | Mod: CPTII,S$GLB,, | Performed by: INTERNAL MEDICINE

## 2021-09-20 PROCEDURE — 99214 PR OFFICE/OUTPT VISIT, EST, LEVL IV, 30-39 MIN: ICD-10-PCS | Mod: S$GLB,,, | Performed by: INTERNAL MEDICINE

## 2021-09-20 PROCEDURE — 73630 X-RAY EXAM OF FOOT: CPT | Mod: TC,RT

## 2021-09-20 PROCEDURE — 1159F PR MEDICATION LIST DOCUMENTED IN MEDICAL RECORD: ICD-10-PCS | Mod: CPTII,S$GLB,, | Performed by: INTERNAL MEDICINE

## 2021-09-20 PROCEDURE — 99999 PR PBB SHADOW E&M-EST. PATIENT-LVL III: CPT | Mod: PBBFAC,,, | Performed by: INTERNAL MEDICINE

## 2021-09-20 PROCEDURE — 73630 X-RAY EXAM OF FOOT: CPT | Mod: 26,RT,, | Performed by: RADIOLOGY

## 2021-09-20 PROCEDURE — 99214 OFFICE O/P EST MOD 30 MIN: CPT | Mod: S$GLB,,, | Performed by: INTERNAL MEDICINE

## 2021-09-20 RX ORDER — ETODOLAC 400 MG/1
400 TABLET, EXTENDED RELEASE ORAL DAILY
Qty: 90 TABLET | Refills: 1 | Status: SHIPPED | OUTPATIENT
Start: 2021-09-20 | End: 2023-02-07 | Stop reason: SDUPTHER

## 2021-09-28 ENCOUNTER — PATIENT MESSAGE (OUTPATIENT)
Dept: RHEUMATOLOGY | Facility: CLINIC | Age: 51
End: 2021-09-28

## 2021-09-28 LAB
ALBUMIN SERPL-MCNC: 4.7 G/DL (ref 3.8–4.8)
ALBUMIN/GLOB SERPL: 1.7 {RATIO} (ref 1.2–2.2)
ALP SERPL-CCNC: 61 IU/L (ref 44–121)
ALT SERPL-CCNC: 10 IU/L (ref 0–32)
AST SERPL-CCNC: 13 IU/L (ref 0–40)
BASOPHILS # BLD AUTO: 0.1 X10E3/UL (ref 0–0.2)
BASOPHILS NFR BLD AUTO: 1 %
BILIRUB SERPL-MCNC: 0.5 MG/DL (ref 0–1.2)
BUN SERPL-MCNC: 11 MG/DL (ref 6–24)
BUN/CREAT SERPL: 13 (ref 9–23)
CALCIUM SERPL-MCNC: 8.6 MG/DL (ref 8.7–10.2)
CHLORIDE SERPL-SCNC: 101 MMOL/L (ref 96–106)
CO2 SERPL-SCNC: 25 MMOL/L (ref 20–29)
CREAT SERPL-MCNC: 0.88 MG/DL (ref 0.57–1)
CRP SERPL-MCNC: <1 MG/L (ref 0–10)
EOSINOPHIL # BLD AUTO: 0.1 X10E3/UL (ref 0–0.4)
EOSINOPHIL NFR BLD AUTO: 1 %
ERYTHROCYTE [DISTWIDTH] IN BLOOD BY AUTOMATED COUNT: 12.3 % (ref 11.7–15.4)
ERYTHROCYTE [SEDIMENTATION RATE] IN BLOOD BY WESTERGREN METHOD: 6 MM/HR (ref 0–40)
GLOBULIN SER CALC-MCNC: 2.7 G/DL (ref 1.5–4.5)
GLUCOSE SERPL-MCNC: 82 MG/DL (ref 65–99)
HCT VFR BLD AUTO: 43.5 % (ref 34–46.6)
HGB BLD-MCNC: 14.9 G/DL (ref 11.1–15.9)
IMM GRANULOCYTES # BLD AUTO: 0 X10E3/UL (ref 0–0.1)
IMM GRANULOCYTES NFR BLD AUTO: 0 %
LYMPHOCYTES # BLD AUTO: 2.1 X10E3/UL (ref 0.7–3.1)
LYMPHOCYTES NFR BLD AUTO: 29 %
MCH RBC QN AUTO: 31.4 PG (ref 26.6–33)
MCHC RBC AUTO-ENTMCNC: 34.3 G/DL (ref 31.5–35.7)
MCV RBC AUTO: 92 FL (ref 79–97)
MONOCYTES # BLD AUTO: 0.5 X10E3/UL (ref 0.1–0.9)
MONOCYTES NFR BLD AUTO: 6 %
NEUTROPHILS # BLD AUTO: 4.6 X10E3/UL (ref 1.4–7)
NEUTROPHILS NFR BLD AUTO: 63 %
PLATELET # BLD AUTO: 261 X10E3/UL (ref 150–450)
POTASSIUM SERPL-SCNC: 4.7 MMOL/L (ref 3.5–5.2)
PROT SERPL-MCNC: 7.4 G/DL (ref 6–8.5)
RBC # BLD AUTO: 4.74 X10E6/UL (ref 3.77–5.28)
SODIUM SERPL-SCNC: 139 MMOL/L (ref 134–144)
WBC # BLD AUTO: 7.4 X10E3/UL (ref 3.4–10.8)

## 2022-01-25 ENCOUNTER — OFFICE VISIT (OUTPATIENT)
Dept: FAMILY MEDICINE | Facility: CLINIC | Age: 52
End: 2022-01-25
Payer: COMMERCIAL

## 2022-01-25 VITALS
HEIGHT: 69 IN | DIASTOLIC BLOOD PRESSURE: 82 MMHG | OXYGEN SATURATION: 97 % | BODY MASS INDEX: 25.37 KG/M2 | WEIGHT: 171.31 LBS | HEART RATE: 70 BPM | SYSTOLIC BLOOD PRESSURE: 132 MMHG

## 2022-01-25 DIAGNOSIS — E53.8 B12 DEFICIENCY: ICD-10-CM

## 2022-01-25 DIAGNOSIS — E83.51 HYPOCALCEMIA: ICD-10-CM

## 2022-01-25 DIAGNOSIS — E89.2 HYPOPARATHYROIDISM AFTER PROCEDURE: ICD-10-CM

## 2022-01-25 DIAGNOSIS — E89.0 S/P THYROIDECTOMY: ICD-10-CM

## 2022-01-25 DIAGNOSIS — Z12.11 COLON CANCER SCREENING: Primary | ICD-10-CM

## 2022-01-25 PROCEDURE — 3008F PR BODY MASS INDEX (BMI) DOCUMENTED: ICD-10-PCS | Mod: CPTII,S$GLB,, | Performed by: FAMILY MEDICINE

## 2022-01-25 PROCEDURE — 99999 PR PBB SHADOW E&M-EST. PATIENT-LVL IV: ICD-10-PCS | Mod: PBBFAC,,, | Performed by: FAMILY MEDICINE

## 2022-01-25 PROCEDURE — 1160F PR REVIEW ALL MEDS BY PRESCRIBER/CLIN PHARMACIST DOCUMENTED: ICD-10-PCS | Mod: CPTII,S$GLB,, | Performed by: FAMILY MEDICINE

## 2022-01-25 PROCEDURE — 96372 THER/PROPH/DIAG INJ SC/IM: CPT | Mod: S$GLB,,, | Performed by: FAMILY MEDICINE

## 2022-01-25 PROCEDURE — 99214 OFFICE O/P EST MOD 30 MIN: CPT | Mod: 25,S$GLB,, | Performed by: FAMILY MEDICINE

## 2022-01-25 PROCEDURE — 3079F PR MOST RECENT DIASTOLIC BLOOD PRESSURE 80-89 MM HG: ICD-10-PCS | Mod: CPTII,S$GLB,, | Performed by: FAMILY MEDICINE

## 2022-01-25 PROCEDURE — 3075F SYST BP GE 130 - 139MM HG: CPT | Mod: CPTII,S$GLB,, | Performed by: FAMILY MEDICINE

## 2022-01-25 PROCEDURE — 3075F PR MOST RECENT SYSTOLIC BLOOD PRESS GE 130-139MM HG: ICD-10-PCS | Mod: CPTII,S$GLB,, | Performed by: FAMILY MEDICINE

## 2022-01-25 PROCEDURE — 3008F BODY MASS INDEX DOCD: CPT | Mod: CPTII,S$GLB,, | Performed by: FAMILY MEDICINE

## 2022-01-25 PROCEDURE — 1159F PR MEDICATION LIST DOCUMENTED IN MEDICAL RECORD: ICD-10-PCS | Mod: CPTII,S$GLB,, | Performed by: FAMILY MEDICINE

## 2022-01-25 PROCEDURE — 1160F RVW MEDS BY RX/DR IN RCRD: CPT | Mod: CPTII,S$GLB,, | Performed by: FAMILY MEDICINE

## 2022-01-25 PROCEDURE — 99999 PR PBB SHADOW E&M-EST. PATIENT-LVL IV: CPT | Mod: PBBFAC,,, | Performed by: FAMILY MEDICINE

## 2022-01-25 PROCEDURE — 96372 PR INJECTION,THERAP/PROPH/DIAG2ST, IM OR SUBCUT: ICD-10-PCS | Mod: S$GLB,,, | Performed by: FAMILY MEDICINE

## 2022-01-25 PROCEDURE — 1159F MED LIST DOCD IN RCRD: CPT | Mod: CPTII,S$GLB,, | Performed by: FAMILY MEDICINE

## 2022-01-25 PROCEDURE — 99214 PR OFFICE/OUTPT VISIT, EST, LEVL IV, 30-39 MIN: ICD-10-PCS | Mod: 25,S$GLB,, | Performed by: FAMILY MEDICINE

## 2022-01-25 PROCEDURE — 3079F DIAST BP 80-89 MM HG: CPT | Mod: CPTII,S$GLB,, | Performed by: FAMILY MEDICINE

## 2022-01-25 RX ORDER — CYANOCOBALAMIN 1000 UG/ML
1000 INJECTION, SOLUTION INTRAMUSCULAR; SUBCUTANEOUS
Status: COMPLETED | OUTPATIENT
Start: 2022-01-25 | End: 2022-01-25

## 2022-01-25 RX ADMIN — CYANOCOBALAMIN 1000 MCG: 1000 INJECTION, SOLUTION INTRAMUSCULAR; SUBCUTANEOUS at 09:01

## 2022-01-25 NOTE — PROGRESS NOTES
"[facility] Note    Chief Complaint      Chief Complaint   Patient presents with    Establish Care     History of Present Illness      Kristina Graham is a 51 y.o. female who presents with:    Patient new to me.    Here with multiple ailments.  Patient had thyroid removal years ago where her "parathyroid was nicked" and her hormones have not been the same since. She has chronically low Ca levels.  And chronic bouts of fatigues.  She is wondering if she should continue to see Endocrine for this issue.     She is known to be B12 deficient and is interested in getting B12 shot today and maybe monthly.     She needs colon screening.  She is UTD on mammogram, pap smears and eye exam.        Problem List Items Addressed This Visit        Renal/    Hypocalcemia       Endocrine    S/P thyroidectomy    Hypoparathyroidism after procedure      Other Visit Diagnoses     Colon cancer screening    -  Primary    Relevant Orders    Cologuard Screening (Multitarget Stool DNA)    B12 deficiency        Relevant Medications    cyanocobalamin injection 1,000 mcg (Completed)          Health Maintenance   Topic Date Due    TETANUS VACCINE  Never done    Mammogram  2022    Lipid Panel  2026    Hepatitis C Screening  Completed       Past Medical History:   Diagnosis Date    Arthritis     History of vitamin D deficiency 2016    Nontoxic multinodular goiter 2017    Rheumatoid arthritis(714.0)        Past Surgical History:   Procedure Laterality Date    ABDOMINAL SURGERY       SECTION, LOW TRANSVERSE         family history includes Cancer in her father, maternal aunt, maternal grandmother, mother, and paternal uncle; Cataracts in her mother; Diabetes in her maternal grandmother and mother; Glaucoma in her maternal grandfather, maternal grandmother, and mother; Heart disease in her father, paternal grandfather, and paternal grandmother; Hypertension in her father, maternal " grandmother, mother, paternal grandfather, paternal grandmother, paternal uncle, and sister.    Social History     Tobacco Use    Smoking status: Never Smoker    Smokeless tobacco: Never Used    Tobacco comment: drug rep   Substance Use Topics    Alcohol use: Yes     Comment: social    Drug use: No       Review of Systems   Constitutional: Positive for malaise/fatigue. Negative for chills, fever and weight loss.   HENT: Negative for congestion, ear discharge and ear pain.    Eyes: Negative for blurred vision.   Respiratory: Negative for cough and shortness of breath.    Cardiovascular: Negative for chest pain and leg swelling.   Gastrointestinal: Negative for abdominal pain, constipation, diarrhea and vomiting.   Genitourinary: Negative for dysuria.   Musculoskeletal: Negative for myalgias.   Skin: Negative for rash.   Neurological: Negative for dizziness, tingling, focal weakness, weakness and headaches.   Endo/Heme/Allergies: Does not bruise/bleed easily.   Psychiatric/Behavioral: Negative for depression and suicidal ideas.        Outpatient Encounter Medications as of 1/25/2022   Medication Sig Note Dispense Refill    baloxavir marboxiL (XOFLUZA) 40 mg tablet Take 2 tablets by mouth daily  2 tablet 0    calcitRIOL (ROCALTROL) 0.25 MCG Cap TAKE 2 CAPSULES(0.5 MCG) BY MOUTH EVERY DAY (Patient taking differently: Take 0.5 mcg by mouth 2 (two) times daily.)  60 capsule 2    ergocalciferol (ERGOCALCIFEROL) 50,000 unit Cap  2/24/2017: Received from: External Pharmacy      etodolac (LODINE XL) 400 MG 24 hr tablet Take 1 tablet (400 mg total) by mouth once daily.  90 tablet 1    magnesium oxide (MAG-OX) 400 mg tablet Take 400 mg by mouth once daily.       spironolactone (ALDACTONE) 100 MG tablet TK 1 T PO BID   6    TIROSINT 150 mcg Cap Take 1 capsule by mouth once daily. 9/20/2021: Pt is taking 100 mcg       Facility-Administered Encounter Medications as of 1/25/2022   Medication Dose Route Frequency  "Provider Last Rate Last Admin    [COMPLETED] cyanocobalamin injection 1,000 mcg  1,000 mcg Intramuscular 1 time in Clinic/HOD Adrian Bey Jr., MD   1,000 mcg at 01/25/22 0951        Review of patient's allergies indicates:   Allergen Reactions    Bactrim [sulfamethoxazole-trimethoprim]        Physical Exam      Vital Signs  Pulse: 70  SpO2: 97 %  BP: 132/82  Pain Score: 0-No pain  Height and Weight  Height: 5' 9" (175.3 cm)  Weight: 77.7 kg (171 lb 4.8 oz)  BSA (Calculated - sq m): 1.94 sq meters  BMI (Calculated): 25.3  Weight in (lb) to have BMI = 25: 168.9]    Physical Exam  Vitals reviewed.   Constitutional:       General: She is not in acute distress.     Appearance: Normal appearance. She is normal weight. She is not ill-appearing.   HENT:      Head: Normocephalic.      Right Ear: Tympanic membrane normal.      Left Ear: Tympanic membrane normal.      Nose: Nose normal.      Mouth/Throat:      Mouth: Mucous membranes are moist.      Pharynx: Oropharynx is clear.   Eyes:      Extraocular Movements: Extraocular movements intact.      Conjunctiva/sclera: Conjunctivae normal.      Pupils: Pupils are equal, round, and reactive to light.   Cardiovascular:      Rate and Rhythm: Normal rate and regular rhythm.      Pulses: Normal pulses.      Heart sounds: Normal heart sounds.   Pulmonary:      Effort: Pulmonary effort is normal.      Breath sounds: Normal breath sounds.   Abdominal:      General: Abdomen is flat. Bowel sounds are normal. There is no distension.      Palpations: Abdomen is soft.      Tenderness: There is no abdominal tenderness.   Musculoskeletal:         General: Normal range of motion.      Cervical back: Normal range of motion and neck supple.   Skin:     General: Skin is warm and dry.      Capillary Refill: Capillary refill takes less than 2 seconds.      Findings: No rash.   Neurological:      General: No focal deficit present.      Mental Status: She is alert and oriented to person, " place, and time.      Motor: No weakness.      Gait: Gait normal.   Psychiatric:         Mood and Affect: Mood normal.         Behavior: Behavior normal.         Thought Content: Thought content normal.         Judgment: Judgment normal.          Laboratory:  CBC:  No results for input(s): WBC, RBC, HGB, HCT, PLT, MCV, MCH, MCHC in the last 2160 hours.  CMP:  No results for input(s): GLU, CALCIUM, ALBUMIN, PROT, NA, K, CO2, CL, BUN, ALKPHOS, ALT, AST, BILITOT in the last 2160 hours.    Invalid input(s): CREATININ  URINALYSIS:  No results for input(s): COLORU, CLARITYU, SPECGRAV, PHUR, PROTEINUA, GLUCOSEU, BILIRUBINCON, BLOODU, WBCU, RBCU, BACTERIA, MUCUS, NITRITE, LEUKOCYTESUR, UROBILINOGEN, HYALINECASTS in the last 2160 hours.   LIPIDS:  No results for input(s): TSH, HDL, CHOL, TRIG, LDLCALC, CHOLHDL, NONHDLCHOL, TOTALCHOLEST in the last 2160 hours.  TSH:  No results for input(s): TSH in the last 2160 hours.  A1C:  No results for input(s): HGBA1C in the last 2160 hours.    Radiology:      Assessment/Plan     Kristina Graham is a 51 y.o.female with:    1. Colon cancer screening  - Cologuard Screening (Multitarget Stool DNA); Future  - Cologuard Screening (Multitarget Stool DNA)    2. B12 deficiency  - cyanocobalamin injection 1,000 mcg    3. Hypoparathyroidism after procedure    4. Hypocalcemia    5. S/P thyroidectomy    Make sure to f/u with Endocrine.  I told patient I am not comfortable treating her endo ailments and would rather her see a specialist.  Chronic conditions stable.  Will continue to monitor.     Follow up as discussed    If symptoms worsen or do not improve return to clinic, go to Urgent Care or ER  Take Medications as directed      -Continue current medications and maintain follow up with specialists.      Adrian Bey Jr, MD  [facility] [place]

## 2022-01-25 NOTE — PATIENT INSTRUCTIONS
Recommend reading about Intermittent Fasting.  Would recommend starting at Fasting for 14 hours and eating for 10 hours a day.  Eventually graduating to Fasting for 16 hours and Eating for 8 hours a day.  This style of eating has been shown to decrease sugar levels, cholesterol levels, fluid retention, weight and inflammatory Levels.     Also recommend trying to adhere to a 3260-4255 calorie a day intake to aid in weight loss.     Another recommendation would be to adhere to a daily intake of 150 grams of Carbohydrates a day.  This will help with weight loss, sugar levels, cholesterol and fluid retention.      These are just 3 options that are free and simple and keep you aware of what we are putting in our mouths.  Combined with 30-40 minutes of activity a day this could be a great start on your journey to weight loss and better health.      YOU GOT THIS!    A daily food journal for a week would greatly aid me in customizing a regimen for you as well.

## 2022-02-25 ENCOUNTER — CLINICAL SUPPORT (OUTPATIENT)
Dept: FAMILY MEDICINE | Facility: CLINIC | Age: 52
End: 2022-02-25
Payer: COMMERCIAL

## 2022-02-25 PROCEDURE — 99999 PR PBB SHADOW E&M-EST. PATIENT-LVL I: ICD-10-PCS | Mod: PBBFAC,,,

## 2022-02-25 PROCEDURE — 96372 PR INJECTION,THERAP/PROPH/DIAG2ST, IM OR SUBCUT: ICD-10-PCS | Mod: S$GLB,,, | Performed by: FAMILY MEDICINE

## 2022-02-25 PROCEDURE — 99999 PR PBB SHADOW E&M-EST. PATIENT-LVL I: CPT | Mod: PBBFAC,,,

## 2022-02-25 PROCEDURE — 96372 THER/PROPH/DIAG INJ SC/IM: CPT | Mod: S$GLB,,, | Performed by: FAMILY MEDICINE

## 2022-02-25 RX ORDER — CYANOCOBALAMIN 1000 UG/ML
1000 INJECTION, SOLUTION INTRAMUSCULAR; SUBCUTANEOUS
Status: COMPLETED | OUTPATIENT
Start: 2022-02-25 | End: 2022-02-25

## 2022-02-25 RX ADMIN — CYANOCOBALAMIN 1000 MCG: 1000 INJECTION, SOLUTION INTRAMUSCULAR; SUBCUTANEOUS at 10:02

## 2022-02-28 ENCOUNTER — OFFICE VISIT (OUTPATIENT)
Dept: RHEUMATOLOGY | Facility: CLINIC | Age: 52
End: 2022-02-28
Payer: COMMERCIAL

## 2022-02-28 DIAGNOSIS — M25.649 STIFFNESS OF HAND JOINT, UNSPECIFIED LATERALITY: ICD-10-CM

## 2022-02-28 DIAGNOSIS — R76.8 RHEUMATOID FACTOR POSITIVE: Primary | ICD-10-CM

## 2022-02-28 DIAGNOSIS — R53.83 FATIGUE, UNSPECIFIED TYPE: ICD-10-CM

## 2022-02-28 PROCEDURE — 1160F PR REVIEW ALL MEDS BY PRESCRIBER/CLIN PHARMACIST DOCUMENTED: ICD-10-PCS | Mod: CPTII,95,, | Performed by: INTERNAL MEDICINE

## 2022-02-28 PROCEDURE — 1160F RVW MEDS BY RX/DR IN RCRD: CPT | Mod: CPTII,95,, | Performed by: INTERNAL MEDICINE

## 2022-02-28 PROCEDURE — 99213 OFFICE O/P EST LOW 20 MIN: CPT | Mod: 95,,, | Performed by: INTERNAL MEDICINE

## 2022-02-28 PROCEDURE — 99213 PR OFFICE/OUTPT VISIT, EST, LEVL III, 20-29 MIN: ICD-10-PCS | Mod: 95,,, | Performed by: INTERNAL MEDICINE

## 2022-02-28 PROCEDURE — 1159F PR MEDICATION LIST DOCUMENTED IN MEDICAL RECORD: ICD-10-PCS | Mod: CPTII,95,, | Performed by: INTERNAL MEDICINE

## 2022-02-28 PROCEDURE — 1159F MED LIST DOCD IN RCRD: CPT | Mod: CPTII,95,, | Performed by: INTERNAL MEDICINE

## 2022-02-28 NOTE — PROGRESS NOTES
Rapid3 Question Responses and Scores 2/28/2022   MDHAQ Score 0   Psychologic Score 0   Pain Score 1.5   When you awakened in the morning OVER THE LAST WEEK, did you feel stiff? No   If Yes, please indicate the number of hours until you are as limber as you will be for the day -   Fatigue Score 1.5   Global Health Score 0.5   RAPID3 Score 0.67     Answers for HPI/ROS submitted by the patient on 2/28/2022  fever: No  eye redness: No  mouth sores: No  headaches: No  shortness of breath: No  chest pain: No  trouble swallowing: No  diarrhea: No  constipation: No  unexpected weight change: No  genital sore: No  dysuria: No  During the last 3 days, have you had a skin rash?: No  Bruises or bleeds easily: No  cough: No

## 2022-02-28 NOTE — PROGRESS NOTES
Subjective:       Patient ID: Kristina Graham is a 51 y.o. female.    Chief Complaint: Joint pain    HPI:  Kristina Graham is a 51 y.o. female with  history of positive RF and joint pain.  Notes no pain today.  No morning stiffness.   Feels better since getting B12 injections.      Left peroneal nerve compression on EMG for left toe numbness in 2011 patient declined surgery.     History of B12 deficiency, vitamin D deficiency and acne treated with spironolactone.  In 2010 Parvovirus and EBV infections. IgM positive for both.        Due to hypoparathyroid medication being Natpara taken off the market she developed hypocalcemia.  Parathyroid knicked after total thyroidectomy 2/2017.  PTH was elevated recently.  Receiving laser treatment for parathyroid.          Review of Systems   Constitutional: Negative for fever and unexpected weight change.   HENT: Negative for mouth sores and trouble swallowing.    Eyes: Negative for redness.   Respiratory: Negative for cough and shortness of breath.    Cardiovascular: Negative for chest pain.   Gastrointestinal: Negative for constipation and diarrhea.   Genitourinary: Negative for dysuria and genital sores.   Musculoskeletal: Negative for arthralgias.   Skin: Negative for rash.   Neurological: Negative for headaches.   Hematological: Does not bruise/bleed easily.   Psychiatric/Behavioral: Negative.          Objective:   There were no vitals taken for this visit.     Physical Exam   Constitutional: She is oriented to person, place, and time.   HENT:   Head: Normocephalic and atraumatic.   Eyes: Conjunctivae are normal.   Musculoskeletal:      Comments: Able to open and close hands without difficulty   Neurological: She is alert and oriented to person, place, and time.   Skin: No erythema.   Psychiatric: Mood and affect normal.         LABS    Component      Latest Ref Rng & Units 9/27/2021   WBC      3.4 - 10.8 x10E3/uL 7.4   RBC      3.77 - 5.28 x10E6/uL 4.74    Hemoglobin      11.1 - 15.9 g/dL 14.9   Hematocrit      34.0 - 46.6 % 43.5   MCV      79 - 97 fL 92   MCH      26.6 - 33.0 pg 31.4   MCHC      31.5 - 35.7 g/dL 34.3   RDW      11.7 - 15.4 % 12.3   Platelets      150 - 450 x10E3/uL 261   Neutrophils      Not Estab. % 63   Lymph %      Not Estab. % 29   Mono %      Not Estab. % 6   Eosinophil %      Not Estab. % 1   Basophil %      Not Estab. % 1   Neutrophils, Abs      1.4 - 7.0 x10E3/uL 4.6   Lymph #      0.7 - 3.1 x10E3/uL 2.1   Mono #      0.1 - 0.9 x10E3/uL 0.5   Eos #      0.0 - 0.4 x10E3/uL 0.1   Baso #      0.0 - 0.2 x10E3/uL 0.1   Immature Granulocytes      Not Estab. % 0   Immature Grans (Abs)      0.0 - 0.1 x10E3/uL 0.0   Glucose      65 - 99 mg/dL 82   BUN      6 - 24 mg/dL 11   Creatinine      0.57 - 1.00 mg/dL 0.88   eGFR if non African American      >59 mL/min/1.73 77   eGFR if African American      >59 mL/min/1.73 89   BUN/CREAT RATIO      9 - 23 13   Sodium      134 - 144 mmol/L 139   Potassium      3.5 - 5.2 mmol/L 4.7   Chloride      96 - 106 mmol/L 101   CO2      20 - 29 mmol/L 25   Calcium      8.7 - 10.2 mg/dL 8.6 (L)   PROTEIN TOTAL      6.0 - 8.5 g/dL 7.4   Albumin      3.8 - 4.8 g/dL 4.7   Globulin, Total      1.5 - 4.5 g/dL 2.7   Albumin/Globulin Ratio      1.2 - 2.2 1.7   BILIRUBIN TOTAL      0.0 - 1.2 mg/dL 0.5   Alkaline Phosphatase      44 - 121 IU/L 61   AST      0 - 40 IU/L 13   ALT      0 - 32 IU/L 10   CRP      0 - 10 mg/L <1   Sed Rate      0 - 40 mm/hr 6        Assessment:       1. Joint pain/swelling/stiffness.  Currently with pain in right foot after rolling foot and bilateral hand pain.  May be influenced by hypocalcemia  2. S/p Thyroidectomy complicated by hypoparathyroidism and hypocalcemia.  No longer on Natpara.  Taking Calcitriol.  No longer getting parathyroid lasered by primary but primary relocated [place].  3. Positive rheumatoid factor  4. Fatigue   5. Overweight. Improving  6. Vitamin D deficiency.  Currently on  OTC 2,000 units daily.  7. History vitamin B12 deficiency    8. Pre-diabetic.  On Ozempic.  Has lost significant weight loss.   9. Right foot pain.  12 days ago rolled foot. Pain persists    Plan:       1. Continue prn use of daily Lodine  2. RTO 6 months/prn      The patient location is: [place]  The chief complaint leading to consultation is: positive RF    Visit type: TELE AUDIOVISUAL:20357    Face to Face time with patient: 5 minutes  20 minutes of total time spent on the encounter, which includes face to face time and non-face to face time preparing to see the patient (eg, review of tests), Obtaining and/or reviewing separately obtained history, Documenting clinical information in the electronic or other health record, Independently interpreting results (not separately reported) and communicating results to the patient/family/caregiver, or Care coordination (not separately reported).         Each patient to whom he or she provides medical services by telemedicine is:  (1) informed of the relationship between the physician and patient and the respective role of any other health care provider with respect to management of the patient; and (2) notified that he or she may decline to receive medical services by telemedicine and may withdraw from such care at any time.    Notes: see above

## 2022-03-25 ENCOUNTER — CLINICAL SUPPORT (OUTPATIENT)
Dept: FAMILY MEDICINE | Facility: CLINIC | Age: 52
End: 2022-03-25
Payer: COMMERCIAL

## 2022-03-25 DIAGNOSIS — E53.8 B12 DEFICIENCY: Primary | ICD-10-CM

## 2022-03-25 PROCEDURE — 96372 PR INJECTION,THERAP/PROPH/DIAG2ST, IM OR SUBCUT: ICD-10-PCS | Mod: S$GLB,,, | Performed by: FAMILY MEDICINE

## 2022-03-25 PROCEDURE — 96372 THER/PROPH/DIAG INJ SC/IM: CPT | Mod: S$GLB,,, | Performed by: FAMILY MEDICINE

## 2022-03-25 RX ORDER — CYANOCOBALAMIN 1000 UG/ML
1000 INJECTION, SOLUTION INTRAMUSCULAR; SUBCUTANEOUS
Status: COMPLETED | OUTPATIENT
Start: 2022-03-25 | End: 2022-03-25

## 2022-03-25 RX ADMIN — CYANOCOBALAMIN 1000 MCG: 1000 INJECTION, SOLUTION INTRAMUSCULAR; SUBCUTANEOUS at 10:03

## 2022-05-08 ENCOUNTER — PATIENT MESSAGE (OUTPATIENT)
Dept: FAMILY MEDICINE | Facility: CLINIC | Age: 52
End: 2022-05-08
Payer: COMMERCIAL

## 2022-05-31 ENCOUNTER — PATIENT MESSAGE (OUTPATIENT)
Dept: ADMINISTRATIVE | Facility: HOSPITAL | Age: 52
End: 2022-05-31
Payer: COMMERCIAL

## 2022-07-27 ENCOUNTER — OFFICE VISIT (OUTPATIENT)
Dept: OPTOMETRY | Facility: CLINIC | Age: 52
End: 2022-07-27
Payer: COMMERCIAL

## 2022-07-27 DIAGNOSIS — H52.7 REFRACTIVE ERROR: ICD-10-CM

## 2022-07-27 DIAGNOSIS — H04.123 DRY EYE SYNDROME OF BOTH EYES: Primary | ICD-10-CM

## 2022-07-27 PROCEDURE — 92014 COMPRE OPH EXAM EST PT 1/>: CPT | Mod: S$GLB,,, | Performed by: OPTOMETRIST

## 2022-07-27 PROCEDURE — 1159F MED LIST DOCD IN RCRD: CPT | Mod: CPTII,S$GLB,, | Performed by: OPTOMETRIST

## 2022-07-27 PROCEDURE — 99999 PR PBB SHADOW E&M-EST. PATIENT-LVL II: ICD-10-PCS | Mod: PBBFAC,,, | Performed by: OPTOMETRIST

## 2022-07-27 PROCEDURE — 99999 PR PBB SHADOW E&M-EST. PATIENT-LVL II: CPT | Mod: PBBFAC,,, | Performed by: OPTOMETRIST

## 2022-07-27 PROCEDURE — 92014 PR EYE EXAM, EST PATIENT,COMPREHESV: ICD-10-PCS | Mod: S$GLB,,, | Performed by: OPTOMETRIST

## 2022-07-27 PROCEDURE — 92015 DETERMINE REFRACTIVE STATE: CPT | Mod: S$GLB,,, | Performed by: OPTOMETRIST

## 2022-07-27 PROCEDURE — 92015 PR REFRACTION: ICD-10-PCS | Mod: S$GLB,,, | Performed by: OPTOMETRIST

## 2022-07-27 PROCEDURE — 1159F PR MEDICATION LIST DOCUMENTED IN MEDICAL RECORD: ICD-10-PCS | Mod: CPTII,S$GLB,, | Performed by: OPTOMETRIST

## 2022-07-27 NOTE — PROGRESS NOTES
HPI     CC: Pt is here today for a Routine eye exam. She states that she is   seeing well with her current glasses. She mostly wears her glasses to   drive at night. She is having trouble with her distance vision sometimes   but she is aware that she has dry eye.    JULITA: 2019    (+) Changes in vision   (-) Pain  (-) Irritation   (-) Itching   (-) Flashes  (-) Floaters  (+) Glasses wearer  (-) CL wearer  (+) Uses eye gtts, OTC Tears prn OU    Does patient want a refraction today? Yes    (-) Eye injury  (-) Eye surgery   (-)POHx  (-)FOHx    (-)DM               Last edited by Elizabeth Solorio, OD on 7/27/2022 12:41 PM. (History)            Assessment /Plan     For exam results, see Encounter Report.    Dry eye syndrome of both eyes    Refractive error      1. Educated pt on findings. Recommended ATs TID-QID + alice/gel QHS for added lubrication and comfort. If symptoms worsen or dont improve, RTC. Monitor.     2. Updated SRx. Mild change OD, no change OS from habitual. Okay to wear glasses for detailed distance tasks only. Patient not interested in FTW PALs. Monitor yearly.       RTC in 1 year for annual eye exam or sooner if needed.

## 2022-08-04 ENCOUNTER — LAB VISIT (OUTPATIENT)
Dept: LAB | Facility: HOSPITAL | Age: 52
End: 2022-08-04
Attending: INTERNAL MEDICINE
Payer: COMMERCIAL

## 2022-08-04 ENCOUNTER — OFFICE VISIT (OUTPATIENT)
Dept: RHEUMATOLOGY | Facility: CLINIC | Age: 52
End: 2022-08-04
Payer: COMMERCIAL

## 2022-08-04 VITALS
WEIGHT: 174.19 LBS | HEIGHT: 69 IN | BODY MASS INDEX: 25.8 KG/M2 | HEART RATE: 80 BPM | SYSTOLIC BLOOD PRESSURE: 110 MMHG | DIASTOLIC BLOOD PRESSURE: 73 MMHG

## 2022-08-04 DIAGNOSIS — M25.649 STIFFNESS OF HAND JOINT, UNSPECIFIED LATERALITY: ICD-10-CM

## 2022-08-04 DIAGNOSIS — M79.642 BILATERAL HAND PAIN: ICD-10-CM

## 2022-08-04 DIAGNOSIS — R76.8 RHEUMATOID FACTOR POSITIVE: ICD-10-CM

## 2022-08-04 DIAGNOSIS — R53.83 FATIGUE, UNSPECIFIED TYPE: ICD-10-CM

## 2022-08-04 DIAGNOSIS — M79.641 BILATERAL HAND PAIN: Primary | ICD-10-CM

## 2022-08-04 DIAGNOSIS — M79.641 BILATERAL HAND PAIN: ICD-10-CM

## 2022-08-04 DIAGNOSIS — M79.642 BILATERAL HAND PAIN: Primary | ICD-10-CM

## 2022-08-04 LAB
ALBUMIN SERPL BCP-MCNC: 4.2 G/DL (ref 3.5–5.2)
ALP SERPL-CCNC: 48 U/L (ref 55–135)
ALT SERPL W/O P-5'-P-CCNC: 14 U/L (ref 10–44)
ANION GAP SERPL CALC-SCNC: 9 MMOL/L (ref 8–16)
AST SERPL-CCNC: 13 U/L (ref 10–40)
BASOPHILS # BLD AUTO: 0.05 K/UL (ref 0–0.2)
BASOPHILS NFR BLD: 0.6 % (ref 0–1.9)
BILIRUB SERPL-MCNC: 0.7 MG/DL (ref 0.1–1)
BUN SERPL-MCNC: 12 MG/DL (ref 6–20)
CALCIUM SERPL-MCNC: 8.6 MG/DL (ref 8.7–10.5)
CHLORIDE SERPL-SCNC: 102 MMOL/L (ref 95–110)
CO2 SERPL-SCNC: 28 MMOL/L (ref 23–29)
CREAT SERPL-MCNC: 0.9 MG/DL (ref 0.5–1.4)
CRP SERPL-MCNC: 0.6 MG/L (ref 0–8.2)
DIFFERENTIAL METHOD: ABNORMAL
EOSINOPHIL # BLD AUTO: 0.1 K/UL (ref 0–0.5)
EOSINOPHIL NFR BLD: 1.6 % (ref 0–8)
ERYTHROCYTE [DISTWIDTH] IN BLOOD BY AUTOMATED COUNT: 11.9 % (ref 11.5–14.5)
ERYTHROCYTE [SEDIMENTATION RATE] IN BLOOD BY PHOTOMETRIC METHOD: <2 MM/HR (ref 0–36)
EST. GFR  (NO RACE VARIABLE): >60 ML/MIN/1.73 M^2
GLUCOSE SERPL-MCNC: 80 MG/DL (ref 70–110)
HCT VFR BLD AUTO: 42.1 % (ref 37–48.5)
HGB BLD-MCNC: 14.7 G/DL (ref 12–16)
IMM GRANULOCYTES # BLD AUTO: 0.03 K/UL (ref 0–0.04)
IMM GRANULOCYTES NFR BLD AUTO: 0.4 % (ref 0–0.5)
LYMPHOCYTES # BLD AUTO: 2.2 K/UL (ref 1–4.8)
LYMPHOCYTES NFR BLD: 26.4 % (ref 18–48)
MCH RBC QN AUTO: 32 PG (ref 27–31)
MCHC RBC AUTO-ENTMCNC: 34.9 G/DL (ref 32–36)
MCV RBC AUTO: 92 FL (ref 82–98)
MONOCYTES # BLD AUTO: 0.5 K/UL (ref 0.3–1)
MONOCYTES NFR BLD: 6.4 % (ref 4–15)
NEUTROPHILS # BLD AUTO: 5.3 K/UL (ref 1.8–7.7)
NEUTROPHILS NFR BLD: 64.6 % (ref 38–73)
NRBC BLD-RTO: 0 /100 WBC
PLATELET # BLD AUTO: 219 K/UL (ref 150–450)
PMV BLD AUTO: 9.7 FL (ref 9.2–12.9)
POTASSIUM SERPL-SCNC: 4 MMOL/L (ref 3.5–5.1)
PROT SERPL-MCNC: 7.8 G/DL (ref 6–8.4)
RBC # BLD AUTO: 4.59 M/UL (ref 4–5.4)
SODIUM SERPL-SCNC: 139 MMOL/L (ref 136–145)
WBC # BLD AUTO: 8.18 K/UL (ref 3.9–12.7)

## 2022-08-04 PROCEDURE — 1160F PR REVIEW ALL MEDS BY PRESCRIBER/CLIN PHARMACIST DOCUMENTED: ICD-10-PCS | Mod: CPTII,S$GLB,, | Performed by: INTERNAL MEDICINE

## 2022-08-04 PROCEDURE — 86140 C-REACTIVE PROTEIN: CPT | Performed by: INTERNAL MEDICINE

## 2022-08-04 PROCEDURE — 3078F DIAST BP <80 MM HG: CPT | Mod: CPTII,S$GLB,, | Performed by: INTERNAL MEDICINE

## 2022-08-04 PROCEDURE — 3008F BODY MASS INDEX DOCD: CPT | Mod: CPTII,S$GLB,, | Performed by: INTERNAL MEDICINE

## 2022-08-04 PROCEDURE — 1160F RVW MEDS BY RX/DR IN RCRD: CPT | Mod: CPTII,S$GLB,, | Performed by: INTERNAL MEDICINE

## 2022-08-04 PROCEDURE — 99214 OFFICE O/P EST MOD 30 MIN: CPT | Mod: S$GLB,,, | Performed by: INTERNAL MEDICINE

## 2022-08-04 PROCEDURE — 3078F PR MOST RECENT DIASTOLIC BLOOD PRESSURE < 80 MM HG: ICD-10-PCS | Mod: CPTII,S$GLB,, | Performed by: INTERNAL MEDICINE

## 2022-08-04 PROCEDURE — 85025 COMPLETE CBC W/AUTO DIFF WBC: CPT | Performed by: INTERNAL MEDICINE

## 2022-08-04 PROCEDURE — 36415 COLL VENOUS BLD VENIPUNCTURE: CPT | Performed by: INTERNAL MEDICINE

## 2022-08-04 PROCEDURE — 99214 PR OFFICE/OUTPT VISIT, EST, LEVL IV, 30-39 MIN: ICD-10-PCS | Mod: S$GLB,,, | Performed by: INTERNAL MEDICINE

## 2022-08-04 PROCEDURE — 80053 COMPREHEN METABOLIC PANEL: CPT | Performed by: INTERNAL MEDICINE

## 2022-08-04 PROCEDURE — 1159F PR MEDICATION LIST DOCUMENTED IN MEDICAL RECORD: ICD-10-PCS | Mod: CPTII,S$GLB,, | Performed by: INTERNAL MEDICINE

## 2022-08-04 PROCEDURE — 99999 PR PBB SHADOW E&M-EST. PATIENT-LVL III: ICD-10-PCS | Mod: PBBFAC,,, | Performed by: INTERNAL MEDICINE

## 2022-08-04 PROCEDURE — 85652 RBC SED RATE AUTOMATED: CPT | Performed by: INTERNAL MEDICINE

## 2022-08-04 PROCEDURE — 3074F SYST BP LT 130 MM HG: CPT | Mod: CPTII,S$GLB,, | Performed by: INTERNAL MEDICINE

## 2022-08-04 PROCEDURE — 3074F PR MOST RECENT SYSTOLIC BLOOD PRESSURE < 130 MM HG: ICD-10-PCS | Mod: CPTII,S$GLB,, | Performed by: INTERNAL MEDICINE

## 2022-08-04 PROCEDURE — 1159F MED LIST DOCD IN RCRD: CPT | Mod: CPTII,S$GLB,, | Performed by: INTERNAL MEDICINE

## 2022-08-04 PROCEDURE — 3008F PR BODY MASS INDEX (BMI) DOCUMENTED: ICD-10-PCS | Mod: CPTII,S$GLB,, | Performed by: INTERNAL MEDICINE

## 2022-08-04 PROCEDURE — 99999 PR PBB SHADOW E&M-EST. PATIENT-LVL III: CPT | Mod: PBBFAC,,, | Performed by: INTERNAL MEDICINE

## 2022-08-04 RX ORDER — LEVOTHYROXINE SODIUM 88 UG/1
88 CAPSULE ORAL DAILY
COMMUNITY

## 2022-08-04 RX ORDER — ATORVASTATIN CALCIUM 10 MG/1
10 TABLET, FILM COATED ORAL DAILY
COMMUNITY
Start: 2022-06-20

## 2022-08-04 RX ORDER — MEDROXYPROGESTERONE ACETATE 10 MG/1
10 TABLET ORAL 2 TIMES DAILY
COMMUNITY
Start: 2022-07-20 | End: 2023-07-12 | Stop reason: CLARIF

## 2022-08-04 RX ORDER — SEMAGLUTIDE 1.34 MG/ML
INJECTION, SOLUTION SUBCUTANEOUS
COMMUNITY
Start: 2020-10-19

## 2022-08-04 NOTE — PROGRESS NOTES
Rapid3 Question Responses and Scores 8/2/2022   MDHAQ Score 0   Psychologic Score 1.1   Pain Score 3   When you awakened in the morning OVER THE LAST WEEK, did you feel stiff? Yes   If Yes, please indicate the number of hours until you are as limber as you will be for the day 1   Fatigue Score 7   Global Health Score 1   RAPID3 Score 1.33     Answers for HPI/ROS submitted by the patient on 8/2/2022  fever: No  eye redness: No  mouth sores: No  headaches: No  shortness of breath: No  chest pain: No  trouble swallowing: No  diarrhea: No  constipation: No  unexpected weight change: No  genital sore: No  dysuria: No  During the last 3 days, have you had a skin rash?: No  Bruises or bleeds easily: No  cough: No

## 2022-08-04 NOTE — PROGRESS NOTES
"Subjective:       Patient ID: Kristina Graham is a 51 y.o. female.    Chief Complaint: Joint pain    HPI:  Kristina Graham is a 51 y.o. female with  history of positive RF and joint pain.    Since last visit had ovarian cyst bleeding that was treated.    Notes pain intermittently in hands with swelling.  30 minutes morning stiffness.   Felt better on B12 injections.      Left peroneal nerve compression on EMG for left toe numbness in 2011 patient declined surgery.     History of B12 deficiency, vitamin D deficiency and acne treated with spironolactone.  In 2010 Parvovirus and EBV infections. IgM positive for both.        Due to hypoparathyroid medication being Natpara taken off the market she developed hypocalcemia.  Parathyroid knicked after total thyroidectomy 2/2017.  PTH was elevated recently.  Receiving laser treatment for parathyroid.          Review of Systems   Constitutional: Negative for fever and unexpected weight change.   HENT: Negative for mouth sores and trouble swallowing.    Eyes: Negative for redness.   Respiratory: Negative for cough and shortness of breath.    Cardiovascular: Negative for chest pain.   Gastrointestinal: Negative for constipation and diarrhea.   Genitourinary: Negative for dysuria and genital sores.   Musculoskeletal: Negative for arthralgias.   Skin: Negative for rash.   Neurological: Negative for headaches.   Hematological: Does not bruise/bleed easily.   Psychiatric/Behavioral: Negative.          Objective:   /73   Pulse 80   Ht 5' 9" (1.753 m)   Wt 79 kg (174 lb 2.6 oz)   BMI 25.72 kg/m²      Physical Exam   Constitutional: She is oriented to person, place, and time.   HENT:   Head: Normocephalic and atraumatic.   Eyes: Conjunctivae are normal.   Cardiovascular: Normal rate, regular rhythm and normal heart sounds.   Pulmonary/Chest: Effort normal and breath sounds normal.   Abdominal: Soft. Bowel sounds are normal.   Musculoskeletal:      Cervical back: Normal " range of motion.      Comments: 28 joint count: 0 swollen and 0 tender   Neurological: She is alert and oriented to person, place, and time.   Skin: No erythema.   Psychiatric: Mood and affect normal.         LABS    Component      Latest Ref Rng & Units 9/27/2021   WBC      3.4 - 10.8 x10E3/uL 7.4   RBC      3.77 - 5.28 x10E6/uL 4.74   Hemoglobin      11.1 - 15.9 g/dL 14.9   Hematocrit      34.0 - 46.6 % 43.5   MCV      79 - 97 fL 92   MCH      26.6 - 33.0 pg 31.4   MCHC      31.5 - 35.7 g/dL 34.3   RDW      11.7 - 15.4 % 12.3   Platelets      150 - 450 x10E3/uL 261   Neutrophils      Not Estab. % 63   Lymph %      Not Estab. % 29   Mono %      Not Estab. % 6   Eosinophil %      Not Estab. % 1   Basophil %      Not Estab. % 1   Neutrophils, Abs      1.4 - 7.0 x10E3/uL 4.6   Lymph #      0.7 - 3.1 x10E3/uL 2.1   Mono #      0.1 - 0.9 x10E3/uL 0.5   Eos #      0.0 - 0.4 x10E3/uL 0.1   Baso #      0.0 - 0.2 x10E3/uL 0.1   Immature Granulocytes      Not Estab. % 0   Immature Grans (Abs)      0.0 - 0.1 x10E3/uL 0.0   Glucose      65 - 99 mg/dL 82   BUN      6 - 24 mg/dL 11   Creatinine      0.57 - 1.00 mg/dL 0.88   eGFR if non African American      >59 mL/min/1.73 77   eGFR if African American      >59 mL/min/1.73 89   BUN/CREAT RATIO      9 - 23 13   Sodium      134 - 144 mmol/L 139   Potassium      3.5 - 5.2 mmol/L 4.7   Chloride      96 - 106 mmol/L 101   CO2      20 - 29 mmol/L 25   Calcium      8.7 - 10.2 mg/dL 8.6 (L)   PROTEIN TOTAL      6.0 - 8.5 g/dL 7.4   Albumin      3.8 - 4.8 g/dL 4.7   Globulin, Total      1.5 - 4.5 g/dL 2.7   Albumin/Globulin Ratio      1.2 - 2.2 1.7   BILIRUBIN TOTAL      0.0 - 1.2 mg/dL 0.5   Alkaline Phosphatase      44 - 121 IU/L 61   AST      0 - 40 IU/L 13   ALT      0 - 32 IU/L 10   CRP      0 - 10 mg/L <1   Sed Rate      0 - 40 mm/hr 6          Assessment:       1. Joint pain/swelling/stiffness.  Currently with bilateral hand pain.  May be influenced by hypocalcemia  2. S/p  Thyroidectomy complicated by hypoparathyroidism and hypocalcemia.  No longer on Natpara.  Taking Calcitriol.  No longer getting parathyroid lasered by primary but primary relocated [place].  3. Positive rheumatoid factor  4. Fatigue   5. Overweight. Improving  6. Vitamin D deficiency.  Currently on OTC 2,000 units daily.  7. History vitamin B12 deficiency   8. Pre-diabetic.  On Ozempic.  Has lost significant weight loss.   9. Right foot pain.  12 days ago rolled foot. Pain persists    Plan:       1. Continue prn use of daily Lodine  2. Labs    RTO 6 months/prn

## 2022-08-05 ENCOUNTER — PATIENT MESSAGE (OUTPATIENT)
Dept: RHEUMATOLOGY | Facility: CLINIC | Age: 52
End: 2022-08-05
Payer: COMMERCIAL

## 2022-08-19 ENCOUNTER — OFFICE VISIT (OUTPATIENT)
Dept: INTERNAL MEDICINE | Facility: CLINIC | Age: 52
End: 2022-08-19
Payer: COMMERCIAL

## 2022-08-19 VITALS
DIASTOLIC BLOOD PRESSURE: 80 MMHG | OXYGEN SATURATION: 99 % | RESPIRATION RATE: 18 BRPM | TEMPERATURE: 98 F | BODY MASS INDEX: 26.19 KG/M2 | HEART RATE: 100 BPM | WEIGHT: 176.81 LBS | SYSTOLIC BLOOD PRESSURE: 110 MMHG | HEIGHT: 69 IN

## 2022-08-19 DIAGNOSIS — R76.8 RHEUMATOID FACTOR POSITIVE: ICD-10-CM

## 2022-08-19 DIAGNOSIS — E89.2 HYPOPARATHYROIDISM AFTER PROCEDURE: ICD-10-CM

## 2022-08-19 DIAGNOSIS — E78.2 MIXED HYPERLIPIDEMIA: Primary | ICD-10-CM

## 2022-08-19 DIAGNOSIS — E89.0 S/P THYROIDECTOMY: ICD-10-CM

## 2022-08-19 DIAGNOSIS — E53.8 B12 DEFICIENCY: ICD-10-CM

## 2022-08-19 PROCEDURE — 99999 PR PBB SHADOW E&M-EST. PATIENT-LVL IV: ICD-10-PCS | Mod: PBBFAC,,, | Performed by: INTERNAL MEDICINE

## 2022-08-19 PROCEDURE — 3079F PR MOST RECENT DIASTOLIC BLOOD PRESSURE 80-89 MM HG: ICD-10-PCS | Mod: CPTII,S$GLB,, | Performed by: INTERNAL MEDICINE

## 2022-08-19 PROCEDURE — 3008F BODY MASS INDEX DOCD: CPT | Mod: CPTII,S$GLB,, | Performed by: INTERNAL MEDICINE

## 2022-08-19 PROCEDURE — 1160F RVW MEDS BY RX/DR IN RCRD: CPT | Mod: CPTII,S$GLB,, | Performed by: INTERNAL MEDICINE

## 2022-08-19 PROCEDURE — 1159F PR MEDICATION LIST DOCUMENTED IN MEDICAL RECORD: ICD-10-PCS | Mod: CPTII,S$GLB,, | Performed by: INTERNAL MEDICINE

## 2022-08-19 PROCEDURE — 99204 OFFICE O/P NEW MOD 45 MIN: CPT | Mod: S$GLB,,, | Performed by: INTERNAL MEDICINE

## 2022-08-19 PROCEDURE — 99204 PR OFFICE/OUTPT VISIT, NEW, LEVL IV, 45-59 MIN: ICD-10-PCS | Mod: S$GLB,,, | Performed by: INTERNAL MEDICINE

## 2022-08-19 PROCEDURE — 3008F PR BODY MASS INDEX (BMI) DOCUMENTED: ICD-10-PCS | Mod: CPTII,S$GLB,, | Performed by: INTERNAL MEDICINE

## 2022-08-19 PROCEDURE — 1160F PR REVIEW ALL MEDS BY PRESCRIBER/CLIN PHARMACIST DOCUMENTED: ICD-10-PCS | Mod: CPTII,S$GLB,, | Performed by: INTERNAL MEDICINE

## 2022-08-19 PROCEDURE — 1159F MED LIST DOCD IN RCRD: CPT | Mod: CPTII,S$GLB,, | Performed by: INTERNAL MEDICINE

## 2022-08-19 PROCEDURE — 99999 PR PBB SHADOW E&M-EST. PATIENT-LVL IV: CPT | Mod: PBBFAC,,, | Performed by: INTERNAL MEDICINE

## 2022-08-19 PROCEDURE — 3074F SYST BP LT 130 MM HG: CPT | Mod: CPTII,S$GLB,, | Performed by: INTERNAL MEDICINE

## 2022-08-19 PROCEDURE — 3079F DIAST BP 80-89 MM HG: CPT | Mod: CPTII,S$GLB,, | Performed by: INTERNAL MEDICINE

## 2022-08-19 PROCEDURE — 3074F PR MOST RECENT SYSTOLIC BLOOD PRESSURE < 130 MM HG: ICD-10-PCS | Mod: CPTII,S$GLB,, | Performed by: INTERNAL MEDICINE

## 2022-08-19 RX ORDER — CYANOCOBALAMIN 1000 UG/ML
1000 INJECTION, SOLUTION INTRAMUSCULAR; SUBCUTANEOUS
Qty: 10 ML | Refills: 3 | Status: SHIPPED | OUTPATIENT
Start: 2022-08-19 | End: 2023-11-06

## 2022-08-19 NOTE — PROGRESS NOTES
[facility] Note    Chief Complaint      Chief Complaint   Patient presents with    Establish Care       History of Present Illness      Kristina Graham is a 51 y.o. female who presents today for   Chief Complaint   Patient presents with    [facility]   .  Patient comes to appointment here for establish visit with me . She is generally feeling well is compliant with all meds and specialist f/u . complex medical history . I have reviewed old record . Is seeing genna dill and rheum toussaint . She is past due for her b12 inj . She is due for mammogram she will be scheduling . She is going to be doing her cologuard that has been ordered .    Problem List Items Addressed This Visit        Cardiac/Vascular    Mixed hyperlipidemia - Primary    Overview     Cont atorvasatin . Last lipid was                Immunology/Multi System    Rheumatoid factor positive    Overview     Is following with rheumatology currently.              Endocrine    S/P thyroidectomy    Overview     Good on current regimen            Hypoparathyroidism after procedure    Overview     Is seeing dr aniyah vail cont per there recs            B12 deficiency    Overview     Will set up for b12 injections                    Past Medical History:  Past Medical History:   Diagnosis Date    Arthritis     History of vitamin D deficiency 2016    Mixed hyperlipidemia 2022    Nontoxic multinodular goiter 2017    Rheumatoid arthritis(714.0)        Past Surgical History:  Past Surgical History:   Procedure Laterality Date    ABDOMINAL SURGERY       SECTION, LOW TRANSVERSE         Family History:  family history includes Cancer in her father, maternal aunt, maternal grandmother, mother, and paternal uncle; Cataracts in her mother; Diabetes in her maternal grandmother and mother; Glaucoma in her maternal grandfather, maternal grandmother, and mother; Heart disease in her father, paternal grandfather,  and paternal grandmother; Hypertension in her father, maternal grandmother, mother, paternal grandfather, paternal grandmother, paternal uncle, and sister.    Social History:  Social History     Socioeconomic History    Marital status:    Tobacco Use    Smoking status: Never Smoker    Smokeless tobacco: Never Used    Tobacco comment: drug rep   Substance and Sexual Activity    Alcohol use: Not Currently     Comment: social    Drug use: Never    Sexual activity: Yes     Partners: Male     Birth control/protection: Partner-Vasectomy       Review of Systems:   Review of Systems   Constitutional: Negative for fever and weight loss.   HENT: Negative for congestion, hearing loss and sore throat.    Eyes: Negative for blurred vision.   Respiratory: Negative for cough and shortness of breath.    Cardiovascular: Negative for chest pain, palpitations, claudication and leg swelling.   Gastrointestinal: Negative for abdominal pain, constipation, diarrhea and heartburn.   Genitourinary: Negative for dysuria.   Musculoskeletal: Negative for back pain and myalgias.   Skin: Negative for rash.   Neurological: Negative for focal weakness and headaches.   Psychiatric/Behavioral: Negative for depression and suicidal ideas. The patient is not nervous/anxious.          Medications:  Outpatient Encounter Medications as of 8/19/2022   Medication Sig Note Dispense Refill    calcitRIOL (ROCALTROL) 0.25 MCG Cap TAKE 2 CAPSULES(0.5 MCG) BY MOUTH EVERY DAY (Patient taking differently: Take 0.5 mcg by mouth 2 (two) times daily.)  60 capsule 2    etodolac (LODINE XL) 400 MG 24 hr tablet Take 1 tablet (400 mg total) by mouth once daily.  90 tablet 1    levothyroxine (TIROSINT) 88 mcg Cap Take 88 mcg by mouth Daily.       magnesium oxide (MAG-OX) 400 mg tablet Take 400 mg by mouth once daily.       semaglutide (OZEMPIC) 1 mg/dose (4 mg/3 mL)        atorvastatin (LIPITOR) 10 MG tablet Take 10 mg by mouth once daily.        cyanocobalamin 1,000 mcg/mL injection Inject 1 mL (1,000 mcg total) into the muscle every 30 days.  10 mL 3    medroxyPROGESTERone (PROVERA) 10 MG tablet Take 10 mg by mouth 2 (two) times daily.       spironolactone (ALDACTONE) 100 MG tablet TK 1 T PO BID   6    [DISCONTINUED] ergocalciferol (ERGOCALCIFEROL) 50,000 unit Cap 22 Units. 2/24/2017: Received from: External Pharmacy       No facility-administered encounter medications on file as of 8/19/2022.        Allergies:  Review of patient's allergies indicates:   Allergen Reactions    Bactrim [sulfamethoxazole-trimethoprim]     Ergocalciferol (vit d2) (bulk) Other (See Comments)     Vomiting a few hours after taking each dose.          Physical Exam         Vitals:    08/19/22 1315   BP: 110/80   Pulse: 100   Resp: 18   Temp: 98 °F (36.7 °C)         Physical Exam  Constitutional:       Appearance: She is well-developed.   Eyes:      Pupils: Pupils are equal, round, and reactive to light.   Neck:      Thyroid: No thyromegaly.   Cardiovascular:      Rate and Rhythm: Normal rate.      Heart sounds: Normal heart sounds. No murmur heard.    No friction rub. No gallop.   Pulmonary:      Breath sounds: Normal breath sounds.   Abdominal:      General: Bowel sounds are normal.      Palpations: Abdomen is soft.   Musculoskeletal:         General: Normal range of motion.      Cervical back: Normal range of motion.   Lymphadenopathy:      Cervical: No cervical adenopathy.   Skin:     General: Skin is warm.      Findings: No rash.   Neurological:      Mental Status: She is alert and oriented to person, place, and time.      Cranial Nerves: No cranial nerve deficit.   Psychiatric:         Behavior: Behavior normal.          Laboratory:  CBC:  Recent Labs   Lab Result Units 08/04/22  1203   WBC K/uL 8.18   RBC M/uL 4.59   Hemoglobin g/dL 14.7   Hematocrit % 42.1   Platelets K/uL 219   MCV fL 92   MCH pg 32.0*   MCHC g/dL 34.9     CMP:  Recent Labs   Lab Result Units  08/04/22  1203   Glucose mg/dL 80   Calcium mg/dL 8.6*   Albumin g/dL 4.2   Total Protein g/dL 7.8   Sodium mmol/L 139   Potassium mmol/L 4.0   CO2 mmol/L 28   Chloride mmol/L 102   BUN mg/dL 12   Alkaline Phosphatase U/L 48*   ALT U/L 14   AST U/L 13   Total Bilirubin mg/dL 0.7     URINALYSIS:  No results for input(s): COLORU, CLARITYU, SPECGRAV, PHUR, PROTEINUA, GLUCOSEU, BILIRUBINCON, BLOODU, WBCU, RBCU, BACTERIA, MUCUS, NITRITE, LEUKOCYTESUR, UROBILINOGEN, HYALINECASTS in the last 2160 hours.   LIPIDS:  No results for input(s): TSH, HDL, CHOL, TRIG, LDLCALC, CHOLHDL, NONHDLCHOL, TOTALCHOLEST in the last 2160 hours.  TSH:  No results for input(s): TSH in the last 2160 hours.  A1C:  No results for input(s): HGBA1C in the last 2160 hours.    Radiology:        Assessment:     Kristina Graham is a 51 y.o.female with:    Mixed hyperlipidemia    Hypoparathyroidism after procedure    S/P thyroidectomy    Rheumatoid factor positive    B12 deficiency  -     cyanocobalamin 1,000 mcg/mL injection; Inject 1 mL (1,000 mcg total) into the muscle every 30 days.  Dispense: 10 mL; Refill: 3          Plan:     Problem List Items Addressed This Visit        Cardiac/Vascular    Mixed hyperlipidemia - Primary    Overview     Cont atorvasatin . Last lipid was 6/22               Immunology/Multi System    Rheumatoid factor positive    Overview     Is following with rheumatology currently.              Endocrine    S/P thyroidectomy    Overview     Good on current regimen            Hypoparathyroidism after procedure    Overview     Is seeing dr aniyah vail cont per there recs            B12 deficiency    Overview     Will set up for b12 injections                  As above, continue current medications and maintain follow up with specialists.  Return to clinic in 6 months.      Frederick W Dantagnan [facility] [place]

## 2022-08-31 DIAGNOSIS — Z12.31 OTHER SCREENING MAMMOGRAM: ICD-10-CM

## 2022-09-07 ENCOUNTER — PATIENT MESSAGE (OUTPATIENT)
Dept: ADMINISTRATIVE | Facility: HOSPITAL | Age: 52
End: 2022-09-07
Payer: COMMERCIAL

## 2022-10-03 ENCOUNTER — PATIENT MESSAGE (OUTPATIENT)
Dept: RHEUMATOLOGY | Facility: CLINIC | Age: 52
End: 2022-10-03
Payer: COMMERCIAL

## 2022-10-05 ENCOUNTER — PATIENT MESSAGE (OUTPATIENT)
Dept: RHEUMATOLOGY | Facility: CLINIC | Age: 52
End: 2022-10-05
Payer: COMMERCIAL

## 2022-10-05 DIAGNOSIS — M25.471 PAIN AND SWELLING OF RIGHT ANKLE: Primary | ICD-10-CM

## 2022-10-05 DIAGNOSIS — M25.571 PAIN AND SWELLING OF RIGHT ANKLE: Primary | ICD-10-CM

## 2022-10-07 ENCOUNTER — PATIENT MESSAGE (OUTPATIENT)
Dept: RHEUMATOLOGY | Facility: CLINIC | Age: 52
End: 2022-10-07
Payer: COMMERCIAL

## 2022-10-10 ENCOUNTER — PATIENT MESSAGE (OUTPATIENT)
Dept: RHEUMATOLOGY | Facility: CLINIC | Age: 52
End: 2022-10-10
Payer: COMMERCIAL

## 2022-10-17 ENCOUNTER — PATIENT MESSAGE (OUTPATIENT)
Dept: RHEUMATOLOGY | Facility: CLINIC | Age: 52
End: 2022-10-17
Payer: COMMERCIAL

## 2022-10-17 DIAGNOSIS — M25.571 PAIN AND SWELLING OF RIGHT ANKLE: Primary | ICD-10-CM

## 2022-10-17 DIAGNOSIS — M25.471 PAIN AND SWELLING OF RIGHT ANKLE: Primary | ICD-10-CM

## 2022-10-18 ENCOUNTER — PATIENT MESSAGE (OUTPATIENT)
Dept: RHEUMATOLOGY | Facility: CLINIC | Age: 52
End: 2022-10-18
Payer: COMMERCIAL

## 2022-10-19 ENCOUNTER — OFFICE VISIT (OUTPATIENT)
Dept: ORTHOPEDICS | Facility: CLINIC | Age: 52
End: 2022-10-19
Payer: COMMERCIAL

## 2022-10-19 VITALS — HEIGHT: 69 IN | BODY MASS INDEX: 26.19 KG/M2 | WEIGHT: 176.81 LBS

## 2022-10-19 DIAGNOSIS — M25.571 PAIN IN JOINT INVOLVING RIGHT ANKLE AND FOOT: ICD-10-CM

## 2022-10-19 DIAGNOSIS — S86.301A INJURY OF PERONEAL TENDON OF RIGHT FOOT, INITIAL ENCOUNTER: Primary | ICD-10-CM

## 2022-10-19 DIAGNOSIS — M25.571 PAIN AND SWELLING OF RIGHT ANKLE: ICD-10-CM

## 2022-10-19 DIAGNOSIS — M25.471 PAIN AND SWELLING OF RIGHT ANKLE: ICD-10-CM

## 2022-10-19 PROCEDURE — 99999 PR PBB SHADOW E&M-EST. PATIENT-LVL III: CPT | Mod: PBBFAC,,, | Performed by: ORTHOPAEDIC SURGERY

## 2022-10-19 PROCEDURE — 1159F MED LIST DOCD IN RCRD: CPT | Mod: CPTII,S$GLB,, | Performed by: ORTHOPAEDIC SURGERY

## 2022-10-19 PROCEDURE — 1159F PR MEDICATION LIST DOCUMENTED IN MEDICAL RECORD: ICD-10-PCS | Mod: CPTII,S$GLB,, | Performed by: ORTHOPAEDIC SURGERY

## 2022-10-19 PROCEDURE — 99203 PR OFFICE/OUTPT VISIT, NEW, LEVL III, 30-44 MIN: ICD-10-PCS | Mod: S$GLB,,, | Performed by: ORTHOPAEDIC SURGERY

## 2022-10-19 PROCEDURE — 99999 PR PBB SHADOW E&M-EST. PATIENT-LVL III: ICD-10-PCS | Mod: PBBFAC,,, | Performed by: ORTHOPAEDIC SURGERY

## 2022-10-19 PROCEDURE — 99203 OFFICE O/P NEW LOW 30 MIN: CPT | Mod: S$GLB,,, | Performed by: ORTHOPAEDIC SURGERY

## 2022-10-19 RX ORDER — METHYLPREDNISOLONE 4 MG/1
TABLET ORAL
Qty: 1 EACH | Refills: 0 | Status: SHIPPED | OUTPATIENT
Start: 2022-10-19 | End: 2023-07-12 | Stop reason: CLARIF

## 2022-10-19 NOTE — PROGRESS NOTES
Subjective:      Patient ID: Kristina Graham is a 51 y.o. female.    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  She does not report any injury but admits that she has been constantly wearing heels while working and walking a lot.  She reports some tightness of her Achilles tendons but no pain.  She points to the lateral aspect of her right ankle and hindfoot in the region of the peroneal tendons as to the location of her pain.  She has a history of positive rheumatoid factor but does not have any specific inflammatory diseases.  She is currently taking Lodine for her pain.  She states she is able to ambulate without much difficulty but is going to be traveling a lot in the next couple of weeks and doing a lot of walking.    Past Medical History:   Diagnosis Date    Arthritis     History of vitamin D deficiency 9/27/2016    Mixed hyperlipidemia 8/19/2022    Nontoxic multinodular goiter 2/9/2017    Rheumatoid arthritis(714.0)        Current Outpatient Medications:     atorvastatin (LIPITOR) 10 MG tablet, Take 10 mg by mouth once daily., Disp: , Rfl:     calcitRIOL (ROCALTROL) 0.25 MCG Cap, TAKE 2 CAPSULES(0.5 MCG) BY MOUTH EVERY DAY (Patient taking differently: Take 0.5 mcg by mouth 2 (two) times daily.), Disp: 60 capsule, Rfl: 2    cyanocobalamin 1,000 mcg/mL injection, Inject 1 mL (1,000 mcg total) into the muscle every 30 days., Disp: 10 mL, Rfl: 3    etodolac (LODINE XL) 400 MG 24 hr tablet, Take 1 tablet (400 mg total) by mouth once daily., Disp: 90 tablet, Rfl: 1    levothyroxine (TIROSINT) 88 mcg Cap, Take 88 mcg by mouth Daily., Disp: , Rfl:     magnesium oxide (MAG-OX) 400 mg tablet, Take 400 mg by mouth once daily., Disp: , Rfl:     medroxyPROGESTERone (PROVERA) 10 MG tablet,  "Take 10 mg by mouth 2 (two) times daily., Disp: , Rfl:     semaglutide (OZEMPIC) 1 mg/dose (4 mg/3 mL), , Disp: , Rfl:     spironolactone (ALDACTONE) 100 MG tablet, TK 1 T PO BID, Disp: , Rfl: 6    methylPREDNISolone (MEDROL, GIORGI,) 4 mg tablet, Take as instructed on package, Disp: 1 each, Rfl: 0  Review of patient's allergies indicates:   Allergen Reactions    Bactrim [sulfamethoxazole-trimethoprim]     Ergocalciferol (vit d2) (bulk) Other (See Comments)     Vomiting a few hours after taking each dose.        Ht 5' 9" (1.753 m)   Wt 80.2 kg (176 lb 12.9 oz)   BMI 26.11 kg/m²     ROS:  Negative for chest pain, shortness of breath, fevers, or unexplained weight loss      Objective:    Ortho Exam      This is a pleasant well-developed well-nourished female who walks in with a slight antalgic gait.  On standing inspection she has plantigrade alignment of her feet with mild cavus  and neutral aligned heels.  She has some mild swelling of her right ankle compared to the left.  She has some tenderness posterolaterally over the region of the peroneal tendons.  She has active motion of her ankle and subtalar joint and reports some pain with plantar flexion of her ankle in the posterolateral aspect.  She has good function and strength of the peroneal tendons and does not report any pain with resistance to eversion of her hindfoot, but does have some rebound type pain when I stopped the resistance.  She is neurovascularly intact      Assessment:     Imaging:  X-rays of the right ankle performed on 10/07/2022 does not reveal any obvious abnormality and she has well-maintained joint space        1. Injury of peroneal tendon of right foot, initial encounter    2. Pain in joint involving right ankle and foot          Plan:       Orders Placed This Encounter    MRI Ankle Without Contrast Right    methylPREDNISolone (MEDROL, GIORGI,) 4 mg tablet     Recommendation:  I suspect she has some overuse tendinitis of the peroneal tendons as " result of her walking activities in recent shoe wear which has mainly been shoes with heels.  She does have some mild cavus and lateral overload which may contribute to the lateral stress.  Over the last month her symptoms have not worsened so I think she is safe to continue walking, but I am going to obtain an MRI due to the continued pain and her upcoming work travel.  I prescribed a Medrol Dosepak.  I will call her with results of the MRI.

## 2022-10-24 ENCOUNTER — OFFICE VISIT (OUTPATIENT)
Dept: ORTHOPEDICS | Facility: CLINIC | Age: 52
End: 2022-10-24
Payer: COMMERCIAL

## 2022-10-24 DIAGNOSIS — S86.301D INJURY OF PERONEAL TENDON OF RIGHT FOOT, SUBSEQUENT ENCOUNTER: Primary | ICD-10-CM

## 2022-10-24 DIAGNOSIS — M76.71 PERONEAL TENDINITIS OF RIGHT LOWER EXTREMITY: ICD-10-CM

## 2022-10-24 PROCEDURE — 1160F RVW MEDS BY RX/DR IN RCRD: CPT | Mod: CPTII,95,, | Performed by: ORTHOPAEDIC SURGERY

## 2022-10-24 PROCEDURE — 1159F PR MEDICATION LIST DOCUMENTED IN MEDICAL RECORD: ICD-10-PCS | Mod: CPTII,95,, | Performed by: ORTHOPAEDIC SURGERY

## 2022-10-24 PROCEDURE — 99213 OFFICE O/P EST LOW 20 MIN: CPT | Mod: 95,,, | Performed by: ORTHOPAEDIC SURGERY

## 2022-10-24 PROCEDURE — 1159F MED LIST DOCD IN RCRD: CPT | Mod: CPTII,95,, | Performed by: ORTHOPAEDIC SURGERY

## 2022-10-24 PROCEDURE — 99213 PR OFFICE/OUTPT VISIT, EST, LEVL III, 20-29 MIN: ICD-10-PCS | Mod: 95,,, | Performed by: ORTHOPAEDIC SURGERY

## 2022-10-24 PROCEDURE — 1160F PR REVIEW ALL MEDS BY PRESCRIBER/CLIN PHARMACIST DOCUMENTED: ICD-10-PCS | Mod: CPTII,95,, | Performed by: ORTHOPAEDIC SURGERY

## 2022-10-24 NOTE — PROGRESS NOTES
Established Patient - Audio Only Telehealth Visit     The patient location is: [place]  The chief complaint leading to consultation is:  MRI results  Visit type: Virtual visit with audio only (telephone)  Total time spent with patient:  5 minute       The reason for the audio only service rather than synchronous audio and video virtual visit was related to technical difficulties or patient preference/necessity.     Each patient to whom I provide medical services by telemedicine is:  (1) informed of the relationship between the physician and patient and the respective role of any other health care provider with respect to management of the patient; and (2) notified that they may decline to receive medical services by telemedicine and may withdraw from such care at any time. Patient verbally consented to receive this service via voice-only telephone call.       HPI:  This is a 51-year-old female who works as a  on her feet and presented to me on 10/19/2022 with an insidious onset of lateral ankle and hindfoot pain and swelling.  Clinical exam suggested pathology related to the peroneal tendons and because of upcoming job demands on her feet she wanted to obtain an MRI to further assess the peroneal tendons and structures on the lateral aspect of her hindfoot.  She reports that she is doing better since she took the Medrol Dosepak.    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     Assessment and plan:    1. Injury of peroneal tendon of right foot, subsequent encounter        2. Peroneal tendinitis of right lower extremity          Recommendation:  The MRI results were discussed.  I explained to her that I do not believe that the split tear is an acute problem in that she has not had any recent significant injuries and is more than likely the result of  her mild cavovarus alignment and chronic wear and tear on the peroneal tendons.  I believe she is having a flare-up as a result of her recent shoe wear and walking activities.  She has made some shoe wear modifications and is going to wear more comfort supportive shoes until her symptoms resolve.    Follow-up in six weeks                        This service was not originating from a related E/M service provided within the previous 7 days nor will  to an E/M service or procedure within the next 24 hours or my soonest available appointment.  Prevailing standard of care was able to be met in this audio-only visit.

## 2022-10-26 ENCOUNTER — PATIENT MESSAGE (OUTPATIENT)
Dept: RHEUMATOLOGY | Facility: CLINIC | Age: 52
End: 2022-10-26
Payer: COMMERCIAL

## 2022-11-02 ENCOUNTER — PATIENT MESSAGE (OUTPATIENT)
Dept: INTERNAL MEDICINE | Facility: CLINIC | Age: 52
End: 2022-11-02
Payer: COMMERCIAL

## 2022-12-09 ENCOUNTER — OFFICE VISIT (OUTPATIENT)
Dept: ORTHOPEDICS | Facility: CLINIC | Age: 52
End: 2022-12-09
Payer: COMMERCIAL

## 2022-12-09 VITALS — BODY MASS INDEX: 26.19 KG/M2 | WEIGHT: 176.81 LBS | HEIGHT: 69 IN

## 2022-12-09 DIAGNOSIS — M76.71 PERONEAL TENDINITIS OF RIGHT LOWER EXTREMITY: Primary | ICD-10-CM

## 2022-12-09 PROCEDURE — 99213 OFFICE O/P EST LOW 20 MIN: CPT | Mod: S$GLB,,, | Performed by: ORTHOPAEDIC SURGERY

## 2022-12-09 PROCEDURE — 3008F BODY MASS INDEX DOCD: CPT | Mod: CPTII,S$GLB,, | Performed by: ORTHOPAEDIC SURGERY

## 2022-12-09 PROCEDURE — 99213 PR OFFICE/OUTPT VISIT, EST, LEVL III, 20-29 MIN: ICD-10-PCS | Mod: S$GLB,,, | Performed by: ORTHOPAEDIC SURGERY

## 2022-12-09 PROCEDURE — 1159F MED LIST DOCD IN RCRD: CPT | Mod: CPTII,S$GLB,, | Performed by: ORTHOPAEDIC SURGERY

## 2022-12-09 PROCEDURE — 99999 PR PBB SHADOW E&M-EST. PATIENT-LVL III: CPT | Mod: PBBFAC,,, | Performed by: ORTHOPAEDIC SURGERY

## 2022-12-09 PROCEDURE — 1159F PR MEDICATION LIST DOCUMENTED IN MEDICAL RECORD: ICD-10-PCS | Mod: CPTII,S$GLB,, | Performed by: ORTHOPAEDIC SURGERY

## 2022-12-09 PROCEDURE — 3008F PR BODY MASS INDEX (BMI) DOCUMENTED: ICD-10-PCS | Mod: CPTII,S$GLB,, | Performed by: ORTHOPAEDIC SURGERY

## 2022-12-09 PROCEDURE — 1160F PR REVIEW ALL MEDS BY PRESCRIBER/CLIN PHARMACIST DOCUMENTED: ICD-10-PCS | Mod: CPTII,S$GLB,, | Performed by: ORTHOPAEDIC SURGERY

## 2022-12-09 PROCEDURE — 1160F RVW MEDS BY RX/DR IN RCRD: CPT | Mod: CPTII,S$GLB,, | Performed by: ORTHOPAEDIC SURGERY

## 2022-12-09 PROCEDURE — 99999 PR PBB SHADOW E&M-EST. PATIENT-LVL III: ICD-10-PCS | Mod: PBBFAC,,, | Performed by: ORTHOPAEDIC SURGERY

## 2022-12-09 NOTE — PROGRESS NOTES
Kristina Graham  Returns today for follow-up.  This is a 51-year-old female who works as a  on her feet and presented to me initially on 10/19/2022 with an onset of lateral right ankle and hindfoot pain after a twisting injury and clinical exam suggesting pathology related to the peroneal tendons.  Because of her job on her feet I ordered an MRI scan which was done on 10/19/2022.  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  I felt that the split tear was more than likely a chronic problem and as a result of her mild cavovarus alignment of her feet with chronic wear and tear on the peroneal tendons.  I felt that this should resolve with time and she was okay to continue with her job related activities.  She returns today and reports some continued soreness but no worsening pain from her previous visit.  She has been able to continue with her job activities which requires a lot of walking and traveling.  She does not report any further injuries.    Examination:  She walks in today with a normal gait.  On standing inspection she has slight cavovarus alignment of her feet.  There is some mild swelling along the course of the right peroneal tendons.  She has good function and strength of the peroneal tendons.    Impression:  1. Peroneal tendinitis of right lower extremity  Ambulatory referral/consult to Physical/Occupational Therapy          Recommendation:  At this point I would recommend a course of physical therapy.  I am also going to dispensed to her a functional ankle brace to use when she is traveling for her job.    Follow-up in eight weeks if necessary

## 2023-01-03 ENCOUNTER — PATIENT MESSAGE (OUTPATIENT)
Dept: RHEUMATOLOGY | Facility: CLINIC | Age: 53
End: 2023-01-03
Payer: COMMERCIAL

## 2023-01-25 ENCOUNTER — PATIENT MESSAGE (OUTPATIENT)
Dept: ADMINISTRATIVE | Facility: HOSPITAL | Age: 53
End: 2023-01-25
Payer: COMMERCIAL

## 2023-02-01 ENCOUNTER — PATIENT MESSAGE (OUTPATIENT)
Dept: PRIMARY CARE CLINIC | Facility: CLINIC | Age: 53
End: 2023-02-01
Payer: COMMERCIAL

## 2023-02-01 ENCOUNTER — PATIENT MESSAGE (OUTPATIENT)
Dept: RHEUMATOLOGY | Facility: CLINIC | Age: 53
End: 2023-02-01
Payer: COMMERCIAL

## 2023-02-02 DIAGNOSIS — Z12.11 SCREENING FOR COLON CANCER: ICD-10-CM

## 2023-02-02 RX ORDER — SPIRONOLACTONE 100 MG/1
100 TABLET, FILM COATED ORAL DAILY
Qty: 30 TABLET | Refills: 6 | Status: SHIPPED | OUTPATIENT
Start: 2023-02-02

## 2023-02-03 ENCOUNTER — OFFICE VISIT (OUTPATIENT)
Dept: ORTHOPEDICS | Facility: CLINIC | Age: 53
End: 2023-02-03
Payer: COMMERCIAL

## 2023-02-03 VITALS — HEIGHT: 69 IN | WEIGHT: 176.81 LBS | BODY MASS INDEX: 26.19 KG/M2

## 2023-02-03 DIAGNOSIS — M76.71 PERONEAL TENDINITIS OF RIGHT LOWER EXTREMITY: Primary | ICD-10-CM

## 2023-02-03 PROCEDURE — 99213 PR OFFICE/OUTPT VISIT, EST, LEVL III, 20-29 MIN: ICD-10-PCS | Mod: S$GLB,,, | Performed by: ORTHOPAEDIC SURGERY

## 2023-02-03 PROCEDURE — 99213 OFFICE O/P EST LOW 20 MIN: CPT | Mod: S$GLB,,, | Performed by: ORTHOPAEDIC SURGERY

## 2023-02-03 PROCEDURE — 1159F MED LIST DOCD IN RCRD: CPT | Mod: CPTII,S$GLB,, | Performed by: ORTHOPAEDIC SURGERY

## 2023-02-03 PROCEDURE — 99999 PR PBB SHADOW E&M-EST. PATIENT-LVL III: CPT | Mod: PBBFAC,,, | Performed by: ORTHOPAEDIC SURGERY

## 2023-02-03 PROCEDURE — 1159F PR MEDICATION LIST DOCUMENTED IN MEDICAL RECORD: ICD-10-PCS | Mod: CPTII,S$GLB,, | Performed by: ORTHOPAEDIC SURGERY

## 2023-02-03 PROCEDURE — 3008F BODY MASS INDEX DOCD: CPT | Mod: CPTII,S$GLB,, | Performed by: ORTHOPAEDIC SURGERY

## 2023-02-03 PROCEDURE — 3008F PR BODY MASS INDEX (BMI) DOCUMENTED: ICD-10-PCS | Mod: CPTII,S$GLB,, | Performed by: ORTHOPAEDIC SURGERY

## 2023-02-03 PROCEDURE — 99999 PR PBB SHADOW E&M-EST. PATIENT-LVL III: ICD-10-PCS | Mod: PBBFAC,,, | Performed by: ORTHOPAEDIC SURGERY

## 2023-02-03 NOTE — PROGRESS NOTES
Kristina Graham  Returns today for follow-up.  This is a 52-year-old female who works as a  on her feet and presented to me initially on 10/19/2022 with an onset of lateral right ankle and hindfoot pain after twisting injury with clinical exam suggested pathology related to the peroneal tendons.  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  When she returned to see me on 12/09/2022 she reported some continued soreness but no worsening of her pain and was able to do her job activities which require a lot of walking.  At that point I recommended a course of physical therapy.  She returns today and reports she is overall better.  She reports that she did not proceed with any therapy but her neighbor who is a therapist and she has been doing a home exercise program.  She has also made some shoe wear changes which have helped.    Examination:  She walks in today with a normal gait.  On standing inspection she has mild cavus alignment of her feet.  There is some mild prominence in the posterolateral aspect of her ankle which is mildly tender to palpation and in his over the peroneal tendons, but she has good function and strength of the peroneus brevis tendon without any significant pain with resistance to eversion of the hindfoot.    Impression:  1. Peroneal tendinitis of right lower extremity          Recommendation:  Currently she is at a point where she can manage her pain and function without much difficulty.  She gets occasional sharp pains and does not want to entertain any surgical intervention at this time.  As long as her symptoms remained stable and do not become more constant were severe then she can continue to manage symptoms with shoe wear  and activity modifications.  She will continue with her home exercise program.      Follow-up as needed

## 2023-02-06 ENCOUNTER — PATIENT MESSAGE (OUTPATIENT)
Dept: OPTOMETRY | Facility: CLINIC | Age: 53
End: 2023-02-06
Payer: COMMERCIAL

## 2023-02-07 ENCOUNTER — PATIENT OUTREACH (OUTPATIENT)
Dept: ADMINISTRATIVE | Facility: HOSPITAL | Age: 53
End: 2023-02-07
Payer: COMMERCIAL

## 2023-02-07 ENCOUNTER — OFFICE VISIT (OUTPATIENT)
Dept: RHEUMATOLOGY | Facility: CLINIC | Age: 53
End: 2023-02-07
Payer: COMMERCIAL

## 2023-02-07 VITALS
HEART RATE: 87 BPM | SYSTOLIC BLOOD PRESSURE: 115 MMHG | DIASTOLIC BLOOD PRESSURE: 80 MMHG | WEIGHT: 181.44 LBS | HEIGHT: 69 IN | BODY MASS INDEX: 26.87 KG/M2

## 2023-02-07 DIAGNOSIS — H16.223 KERATOCONJUNCTIVITIS SICCA NOT SPECIFIED AS SJOGREN'S, BILATERAL: Primary | ICD-10-CM

## 2023-02-07 DIAGNOSIS — M79.644 BILATERAL THUMB PAIN: ICD-10-CM

## 2023-02-07 DIAGNOSIS — M25.571 CHRONIC PAIN OF RIGHT ANKLE: ICD-10-CM

## 2023-02-07 DIAGNOSIS — R53.83 FATIGUE, UNSPECIFIED TYPE: ICD-10-CM

## 2023-02-07 DIAGNOSIS — G89.29 CHRONIC PAIN OF BOTH ANKLES: ICD-10-CM

## 2023-02-07 DIAGNOSIS — R76.8 RHEUMATOID FACTOR POSITIVE: Primary | ICD-10-CM

## 2023-02-07 DIAGNOSIS — G89.29 CHRONIC PAIN OF RIGHT ANKLE: ICD-10-CM

## 2023-02-07 DIAGNOSIS — H16.223 KERATOCONJUNCTIVITIS SICCA NOT SPECIFIED AS SJOGREN'S, BILATERAL: ICD-10-CM

## 2023-02-07 DIAGNOSIS — M25.571 CHRONIC PAIN OF BOTH ANKLES: ICD-10-CM

## 2023-02-07 DIAGNOSIS — M79.645 BILATERAL THUMB PAIN: ICD-10-CM

## 2023-02-07 DIAGNOSIS — M25.572 CHRONIC PAIN OF BOTH ANKLES: ICD-10-CM

## 2023-02-07 PROCEDURE — 3074F SYST BP LT 130 MM HG: CPT | Mod: CPTII,S$GLB,, | Performed by: INTERNAL MEDICINE

## 2023-02-07 PROCEDURE — 99214 OFFICE O/P EST MOD 30 MIN: CPT | Mod: S$GLB,,, | Performed by: INTERNAL MEDICINE

## 2023-02-07 PROCEDURE — 99999 PR PBB SHADOW E&M-EST. PATIENT-LVL III: CPT | Mod: PBBFAC,,, | Performed by: INTERNAL MEDICINE

## 2023-02-07 PROCEDURE — 99214 PR OFFICE/OUTPT VISIT, EST, LEVL IV, 30-39 MIN: ICD-10-PCS | Mod: S$GLB,,, | Performed by: INTERNAL MEDICINE

## 2023-02-07 PROCEDURE — 1160F RVW MEDS BY RX/DR IN RCRD: CPT | Mod: CPTII,S$GLB,, | Performed by: INTERNAL MEDICINE

## 2023-02-07 PROCEDURE — 1160F PR REVIEW ALL MEDS BY PRESCRIBER/CLIN PHARMACIST DOCUMENTED: ICD-10-PCS | Mod: CPTII,S$GLB,, | Performed by: INTERNAL MEDICINE

## 2023-02-07 PROCEDURE — 3079F PR MOST RECENT DIASTOLIC BLOOD PRESSURE 80-89 MM HG: ICD-10-PCS | Mod: CPTII,S$GLB,, | Performed by: INTERNAL MEDICINE

## 2023-02-07 PROCEDURE — 99999 PR PBB SHADOW E&M-EST. PATIENT-LVL III: ICD-10-PCS | Mod: PBBFAC,,, | Performed by: INTERNAL MEDICINE

## 2023-02-07 PROCEDURE — 1159F MED LIST DOCD IN RCRD: CPT | Mod: CPTII,S$GLB,, | Performed by: INTERNAL MEDICINE

## 2023-02-07 PROCEDURE — 1159F PR MEDICATION LIST DOCUMENTED IN MEDICAL RECORD: ICD-10-PCS | Mod: CPTII,S$GLB,, | Performed by: INTERNAL MEDICINE

## 2023-02-07 PROCEDURE — 3074F PR MOST RECENT SYSTOLIC BLOOD PRESSURE < 130 MM HG: ICD-10-PCS | Mod: CPTII,S$GLB,, | Performed by: INTERNAL MEDICINE

## 2023-02-07 PROCEDURE — 3008F PR BODY MASS INDEX (BMI) DOCUMENTED: ICD-10-PCS | Mod: CPTII,S$GLB,, | Performed by: INTERNAL MEDICINE

## 2023-02-07 PROCEDURE — 3008F BODY MASS INDEX DOCD: CPT | Mod: CPTII,S$GLB,, | Performed by: INTERNAL MEDICINE

## 2023-02-07 PROCEDURE — 3079F DIAST BP 80-89 MM HG: CPT | Mod: CPTII,S$GLB,, | Performed by: INTERNAL MEDICINE

## 2023-02-07 RX ORDER — ETODOLAC 400 MG/1
400 TABLET, EXTENDED RELEASE ORAL DAILY
Qty: 90 TABLET | Refills: 1 | Status: SHIPPED | OUTPATIENT
Start: 2023-02-07 | End: 2023-08-18

## 2023-02-07 NOTE — PROGRESS NOTES
Rapid3 Question Responses and Scores 2/6/2023   MDHAQ Score 0   Psychologic Score 1.1   Pain Score 3   When you awakened in the morning OVER THE LAST WEEK, did you feel stiff? Yes   If Yes, please indicate the number of hours until you are as limber as you will be for the day 1   Fatigue Score 3.5   Global Health Score 1   RAPID3 Score 1.33     Answers submitted by the patient for this visit:  Rheumatology Questionnaire (Submitted on 2/6/2023)  fever: Yes  eye redness: Yes  mouth sores: Yes  headaches: Yes  shortness of breath: Yes  chest pain: Yes  trouble swallowing: Yes  diarrhea: Yes  constipation: Yes  unexpected weight change: Yes  genital sore: Yes  dysuria: Yes  During the last 3 days, have you had a skin rash?: Yes  Bruises or bleeds easily: Yes  cough: Yes

## 2023-02-07 NOTE — TELEPHONE ENCOUNTER
Rx xiidra 1 gtt OU BID. Discussed delayed effectiveness. Patient to continue preservative free ATs 3-4x a day + lubricating alice QHS. Will schedule f/u in 2-3 months.       Elizabeth Solorio, OD  02/07/2023

## 2023-02-07 NOTE — PROGRESS NOTES
"Subjective:       Patient ID: Kristina Graham is a 52 y.o. female.    Chief Complaint: Joint pain    HPI:  Kristina Graham is a 52 y.o. female with  history of positive RF and joint pain.    Since last visit saw ortho Dr. Rajan who said she would need surgery.    Previously she had ovarian cyst bleeding that was treated but no further issues.  Pain 2/10 ache in right ankle.     Notes minimal pain intermittently in hands with swelling.  30 minutes morning stiffness.     Left peroneal nerve compression on EMG for left toe numbness in 2011 patient declined surgery.     History of B12 deficiency, vitamin D deficiency and acne treated with spironolactone.  In 2010 Parvovirus and EBV infections. IgM positive for both.      Due to hypoparathyroid medication being Natpara taken off the market she developed hypocalcemia.  Parathyroid knicked after total thyroidectomy 2/2017.  PTH was elevated recently.  Receiving laser treatment for parathyroid.          Review of Systems   Constitutional:  Negative for fever and unexpected weight change.   HENT:  Negative for mouth sores and trouble swallowing.    Eyes:  Negative for redness.   Respiratory:  Negative for cough and shortness of breath.    Cardiovascular:  Negative for chest pain.   Gastrointestinal:  Negative for constipation and diarrhea.   Genitourinary:  Negative for dysuria and genital sores.   Musculoskeletal:  Negative for arthralgias.   Skin:  Negative for rash.   Neurological:  Negative for headaches.   Hematological:  Does not bruise/bleed easily.   Psychiatric/Behavioral: Negative.         Objective:   /80   Pulse 87   Ht 5' 9" (1.753 m)   Wt 82.3 kg (181 lb 7 oz)   BMI 26.79 kg/m²      Physical Exam   Constitutional: She is oriented to person, place, and time.   HENT:   Head: Normocephalic and atraumatic.   Eyes: Conjunctivae are normal.   Cardiovascular: Normal rate, regular rhythm and normal heart sounds.   Pulmonary/Chest: Effort normal and " breath sounds normal.   Abdominal: Soft. Bowel sounds are normal.   Musculoskeletal:      Cervical back: Normal range of motion.      Comments: 28 joint count: 0 swollen and 0 tender   Neurological: She is alert and oriented to person, place, and time.   Skin: No erythema.   Psychiatric: Mood and affect normal.       LABS    Component      Latest Ref Rng & Units 8/4/2022   WBC      3.90 - 12.70 K/uL 8.18   RBC      4.00 - 5.40 M/uL 4.59   Hemoglobin      12.0 - 16.0 g/dL 14.7   Hematocrit      37.0 - 48.5 % 42.1   MCV      82 - 98 fL 92   MCH      27.0 - 31.0 pg 32.0 (H)   MCHC      32.0 - 36.0 g/dL 34.9   RDW      11.5 - 14.5 % 11.9   Platelets      150 - 450 K/uL 219   MPV      9.2 - 12.9 fL 9.7   Immature Granulocytes      0.0 - 0.5 % 0.4   Gran # (ANC)      1.8 - 7.7 K/uL 5.3   Immature Grans (Abs)      0.00 - 0.04 K/uL 0.03   Lymph #      1.0 - 4.8 K/uL 2.2   Mono #      0.3 - 1.0 K/uL 0.5   Eos #      0.0 - 0.5 K/uL 0.1   Baso #      0.00 - 0.20 K/uL 0.05   nRBC      0 /100 WBC 0   Gran %      38.0 - 73.0 % 64.6   Lymph %      18.0 - 48.0 % 26.4   Mono %      4.0 - 15.0 % 6.4   Eosinophil %      0.0 - 8.0 % 1.6   Basophil %      0.0 - 1.9 % 0.6   Differential Method       Automated   Sodium      136 - 145 mmol/L 139   Potassium      3.5 - 5.1 mmol/L 4.0   Chloride      95 - 110 mmol/L 102   CO2      23 - 29 mmol/L 28   Glucose      70 - 110 mg/dL 80   BUN      6 - 20 mg/dL 12   Creatinine      0.5 - 1.4 mg/dL 0.9   Calcium      8.7 - 10.5 mg/dL 8.6 (L)   PROTEIN TOTAL      6.0 - 8.4 g/dL 7.8   Albumin      3.5 - 5.2 g/dL 4.2   BILIRUBIN TOTAL      0.1 - 1.0 mg/dL 0.7   Alkaline Phosphatase      55 - 135 U/L 48 (L)   AST      10 - 40 U/L 13   ALT      10 - 44 U/L 14   Anion Gap      8 - 16 mmol/L 9   eGFR      >60 mL/min/1.73 m:2 >60.0   Sed Rate      0 - 36 mm/Hr <2   CRP      0.0 - 8.2 mg/L 0.6          Assessment:       1. Joint pain/swelling/stiffness.  Currently with minimal bilateral hand pain.  May be  influenced by hypocalcemia  2. S/p Thyroidectomy complicated by hypoparathyroidism and hypocalcemia.  No longer on Natpara.  Taking Calcitriol.  No longer getting parathyroid lasered by primary but primary relocated [place].  3. Positive rheumatoid factor  4. Fatigue   5. Overweight. Improving  6. Vitamin D deficiency.  Currently on OTC 2,000 units daily.  7. History vitamin B12 deficiency   8. Pre-diabetic.  On Ozempic.  Has lost significant weight loss (20 pounds over one year).   9. Right foot/ankle pain.    Plan:       1. Continue prn use of daily Lodine  2. Consider Voltaren gel topical ankle  3. Labs    RTO 6 months/prn

## 2023-02-11 LAB
BASOPHILS # BLD AUTO: 0 X10E3/UL (ref 0–0.2)
BASOPHILS NFR BLD AUTO: 1 %
CRP SERPL-MCNC: <1 MG/L (ref 0–10)
EOSINOPHIL # BLD AUTO: 0.1 X10E3/UL (ref 0–0.4)
EOSINOPHIL NFR BLD AUTO: 2 %
ERYTHROCYTE [DISTWIDTH] IN BLOOD BY AUTOMATED COUNT: 12 % (ref 11.7–15.4)
ERYTHROCYTE [SEDIMENTATION RATE] IN BLOOD BY WESTERGREN METHOD: 2 MM/HR (ref 0–40)
HCT VFR BLD AUTO: 42.6 % (ref 34–46.6)
HGB BLD-MCNC: 14.6 G/DL (ref 11.1–15.9)
IMM GRANULOCYTES # BLD AUTO: 0 X10E3/UL (ref 0–0.1)
IMM GRANULOCYTES NFR BLD AUTO: 0 %
LYMPHOCYTES # BLD AUTO: 1.8 X10E3/UL (ref 0.7–3.1)
LYMPHOCYTES NFR BLD AUTO: 24 %
MCH RBC QN AUTO: 31.9 PG (ref 26.6–33)
MCHC RBC AUTO-ENTMCNC: 34.3 G/DL (ref 31.5–35.7)
MCV RBC AUTO: 93 FL (ref 79–97)
MONOCYTES # BLD AUTO: 0.6 X10E3/UL (ref 0.1–0.9)
MONOCYTES NFR BLD AUTO: 8 %
NEUTROPHILS # BLD AUTO: 5 X10E3/UL (ref 1.4–7)
NEUTROPHILS NFR BLD AUTO: 65 %
PLATELET # BLD AUTO: 249 X10E3/UL (ref 150–450)
RBC # BLD AUTO: 4.57 X10E6/UL (ref 3.77–5.28)
WBC # BLD AUTO: 7.6 X10E3/UL (ref 3.4–10.8)

## 2023-02-13 ENCOUNTER — PATIENT MESSAGE (OUTPATIENT)
Dept: RHEUMATOLOGY | Facility: CLINIC | Age: 53
End: 2023-02-13
Payer: COMMERCIAL

## 2023-02-22 LAB — HEMOCCULT STL QL IA: POSITIVE

## 2023-05-20 ENCOUNTER — OFFICE VISIT (OUTPATIENT)
Dept: URGENT CARE | Facility: CLINIC | Age: 53
End: 2023-05-20
Payer: COMMERCIAL

## 2023-05-20 VITALS
SYSTOLIC BLOOD PRESSURE: 145 MMHG | TEMPERATURE: 98 F | HEIGHT: 69 IN | WEIGHT: 181 LBS | OXYGEN SATURATION: 98 % | DIASTOLIC BLOOD PRESSURE: 72 MMHG | RESPIRATION RATE: 18 BRPM | BODY MASS INDEX: 26.81 KG/M2 | HEART RATE: 90 BPM

## 2023-05-20 DIAGNOSIS — J40 BRONCHITIS: ICD-10-CM

## 2023-05-20 DIAGNOSIS — R09.3 ABNORMAL SPUTUM AMOUNT: ICD-10-CM

## 2023-05-20 DIAGNOSIS — J98.01 BRONCHOSPASM: ICD-10-CM

## 2023-05-20 DIAGNOSIS — R09.3 ABNORMAL SPUTUM COLOR: ICD-10-CM

## 2023-05-20 DIAGNOSIS — R05.9 COUGH, UNSPECIFIED TYPE: Primary | ICD-10-CM

## 2023-05-20 LAB
CTP QC/QA: YES
SARS-COV-2 AG RESP QL IA.RAPID: NEGATIVE

## 2023-05-20 PROCEDURE — 99214 OFFICE O/P EST MOD 30 MIN: CPT | Mod: 25,S$GLB,, | Performed by: PHYSICIAN ASSISTANT

## 2023-05-20 PROCEDURE — 96372 THER/PROPH/DIAG INJ SC/IM: CPT | Mod: S$GLB,,, | Performed by: PHYSICIAN ASSISTANT

## 2023-05-20 PROCEDURE — 94640 AIRWAY INHALATION TREATMENT: CPT | Mod: S$GLB,,, | Performed by: PHYSICIAN ASSISTANT

## 2023-05-20 PROCEDURE — 96372 PR INJECTION,THERAP/PROPH/DIAG2ST, IM OR SUBCUT: ICD-10-PCS | Mod: S$GLB,,, | Performed by: PHYSICIAN ASSISTANT

## 2023-05-20 PROCEDURE — 87811 SARS CORONAVIRUS 2 ANTIGEN POCT, MANUAL READ: ICD-10-PCS | Mod: QW,S$GLB,, | Performed by: PHYSICIAN ASSISTANT

## 2023-05-20 PROCEDURE — 87811 SARS-COV-2 COVID19 W/OPTIC: CPT | Mod: QW,S$GLB,, | Performed by: PHYSICIAN ASSISTANT

## 2023-05-20 PROCEDURE — 94640 PR INHAL RX, AIRWAY OBST/DX SPUTUM INDUCT: ICD-10-PCS | Mod: S$GLB,,, | Performed by: PHYSICIAN ASSISTANT

## 2023-05-20 PROCEDURE — 99214 PR OFFICE/OUTPT VISIT, EST, LEVL IV, 30-39 MIN: ICD-10-PCS | Mod: 25,S$GLB,, | Performed by: PHYSICIAN ASSISTANT

## 2023-05-20 RX ORDER — PROMETHAZINE HYDROCHLORIDE AND DEXTROMETHORPHAN HYDROBROMIDE 6.25; 15 MG/5ML; MG/5ML
5 SYRUP ORAL EVERY 8 HOURS PRN
Qty: 118 ML | Refills: 0 | Status: SHIPPED | OUTPATIENT
Start: 2023-05-20 | End: 2023-05-27

## 2023-05-20 RX ORDER — DEXAMETHASONE SODIUM PHOSPHATE 100 MG/10ML
10 INJECTION INTRAMUSCULAR; INTRAVENOUS
Status: COMPLETED | OUTPATIENT
Start: 2023-05-20 | End: 2023-05-20

## 2023-05-20 RX ORDER — CLINDAMYCIN PHOSPHATE AND BENZOYL PEROXIDE 10; 50 MG/G; MG/G
GEL TOPICAL EVERY MORNING
COMMUNITY
Start: 2023-05-11

## 2023-05-20 RX ORDER — IPRATROPIUM BROMIDE 0.5 MG/2.5ML
0.5 SOLUTION RESPIRATORY (INHALATION)
Status: COMPLETED | OUTPATIENT
Start: 2023-05-20 | End: 2023-05-20

## 2023-05-20 RX ORDER — ALBUTEROL SULFATE 0.83 MG/ML
2.5 SOLUTION RESPIRATORY (INHALATION) EVERY 6 HOURS PRN
Qty: 90 EACH | Refills: 1 | Status: SHIPPED | OUTPATIENT
Start: 2023-05-20 | End: 2023-06-19

## 2023-05-20 RX ORDER — CALCITRIOL 0.5 UG/1
1 CAPSULE ORAL 2 TIMES DAILY
COMMUNITY
Start: 2023-05-01

## 2023-05-20 RX ORDER — LEVALBUTEROL INHALATION SOLUTION 1.25 MG/3ML
1.25 SOLUTION RESPIRATORY (INHALATION)
Status: COMPLETED | OUTPATIENT
Start: 2023-05-20 | End: 2023-05-20

## 2023-05-20 RX ORDER — PREDNISONE 20 MG/1
20 TABLET ORAL DAILY
Qty: 3 TABLET | Refills: 0 | Status: SHIPPED | OUTPATIENT
Start: 2023-05-20 | End: 2023-07-12 | Stop reason: CLARIF

## 2023-05-20 RX ORDER — ALBUTEROL SULFATE 90 UG/1
2 AEROSOL, METERED RESPIRATORY (INHALATION) EVERY 4 HOURS PRN
Qty: 18 G | Refills: 0 | Status: SHIPPED | OUTPATIENT
Start: 2023-05-20 | End: 2023-07-12 | Stop reason: CLARIF

## 2023-05-20 RX ORDER — AZITHROMYCIN 250 MG/1
TABLET, FILM COATED ORAL
Qty: 6 TABLET | Refills: 0 | Status: SHIPPED | OUTPATIENT
Start: 2023-05-20 | End: 2023-05-25

## 2023-05-20 RX ADMIN — DEXAMETHASONE SODIUM PHOSPHATE 10 MG: 100 INJECTION INTRAMUSCULAR; INTRAVENOUS at 11:05

## 2023-05-20 RX ADMIN — LEVALBUTEROL INHALATION SOLUTION 1.25 MG: 1.25 SOLUTION RESPIRATORY (INHALATION) at 12:05

## 2023-05-20 RX ADMIN — IPRATROPIUM BROMIDE 0.5 MG: 0.5 SOLUTION RESPIRATORY (INHALATION) at 11:05

## 2023-05-20 NOTE — PROGRESS NOTES
"ALBUTEROL Subjective:      Patient ID: Krsitina Graham is a 52 y.o. female.    Vitals:  height is 5' 9" (1.753 m) and weight is 82.1 kg (181 lb). Her tympanic temperature is 98.2 °F (36.8 °C). Her blood pressure is 145/72 (abnormal) and her pulse is 90. Her respiration is 18 and oxygen saturation is 98%.     Chief Complaint: Cough    Patient stated her symptoms started 7 days ago.  No exposure to covid, flu or strep. Patient stated her cough is getting worse and it is more productive.  Pace has been coughing with some wheezing for the last 7-10 days not getting better despite over-the-counter medication.  She does have green sputum production.  She also complains of nasal congestion or nasal discharge and nasal pressure with frontal headache    Cough  This is a new problem. The current episode started in the past 7 days. The problem has been gradually worsening. Associated symptoms include ear congestion, ear pain, nasal congestion, postnasal drip and a sore throat. Pertinent negatives include no fever or headaches. Treatments tried: advil, delsym. The treatment provided mild relief. There is no history of asthma, bronchitis, COPD or pneumonia.     Constitution: Positive for activity change, appetite change and fatigue. Negative for fever.   HENT:  Positive for ear pain, congestion, postnasal drip, sinus pain, sinus pressure, sore throat and voice change.    Respiratory:  Positive for cough and sputum production.    Neurological:  Negative for headaches.    Objective:     Physical Exam   Constitutional: She is oriented to person, place, and time. She appears well-developed. She is cooperative.  Non-toxic appearance. She does not appear ill. No distress.      Comments:Patient coughing throughout the exam but in no respiratory distress.     HENT:   Head: Normocephalic and atraumatic.   Ears:   Right Ear: Hearing, tympanic membrane, external ear and ear canal normal.   Left Ear: Hearing, tympanic membrane, external " ear and ear canal normal.   Nose: Nose normal. No mucosal edema, rhinorrhea, nasal deformity or congestion. No epistaxis. Right sinus exhibits no maxillary sinus tenderness and no frontal sinus tenderness. Left sinus exhibits no maxillary sinus tenderness and no frontal sinus tenderness.   Mouth/Throat: Uvula is midline, oropharynx is clear and moist and mucous membranes are normal. No trismus in the jaw. Normal dentition. No uvula swelling. No oropharyngeal exudate, posterior oropharyngeal edema or posterior oropharyngeal erythema.   Eyes: Conjunctivae and lids are normal. Pupils are equal, round, and reactive to light. No scleral icterus. Extraocular movement intact   Neck: Trachea normal and phonation normal. Neck supple. No edema present. No erythema present. No neck rigidity present.   Cardiovascular: Normal rate, regular rhythm, normal heart sounds and normal pulses.   Pulmonary/Chest: Effort normal and breath sounds normal. No respiratory distress. She has no decreased breath sounds. She has no rhonchi.         Comments: Very good air movement throughout all lung fields with occasional end expiratory wheezes heard    Abdominal: Normal appearance.   Musculoskeletal: Normal range of motion.         General: No deformity. Normal range of motion.   Neurological: She is alert and oriented to person, place, and time. She exhibits normal muscle tone. Coordination normal.   Skin: Skin is warm, dry, intact, not diaphoretic and not pale.   Psychiatric: Her speech is normal and behavior is normal. Judgment and thought content normal.   Nursing note and vitals reviewed.  Results for orders placed or performed in visit on 05/20/23   SARS Coronavirus 2 Antigen, POCT Manual Read   Result Value Ref Range    SARS Coronavirus 2 Antigen Negative Negative     Acceptable Yes     No results found.   RE-EVALUATION FOLLOWING THAT HIS RIGHT SIDE AND NEBULIZER  TREATMENT AT 12:20 P.M.  BREATH SOUNDS VERY GOOD AND  EXPIRATORY WHEEZING HAVE COMPLETELY CLEARED  Assessment:     1. Cough, unspecified type    2. Bronchospasm    3. Bronchitis    4. Abnormal sputum amount    5. Abnormal sputum color        Plan:       Cough, unspecified type  -     SARS Coronavirus 2 Antigen, POCT Manual Read  -     predniSONE (DELTASONE) 20 MG tablet; Take 1 tablet (20 mg total) by mouth once daily.  Dispense: 3 tablet; Refill: 0  -     albuterol (VENTOLIN HFA) 90 mcg/actuation inhaler; Inhale 2 puffs into the lungs every 4 (four) hours as needed for Wheezing or Shortness of Breath. Rescue  Dispense: 18 g; Refill: 0  -     albuterol (PROVENTIL) 2.5 mg /3 mL (0.083 %) nebulizer solution; Take 3 mLs (2.5 mg total) by nebulization every 6 (six) hours as needed for Wheezing or Shortness of Breath. Rescue  Dispense: 90 each; Refill: 1  -     promethazine-dextromethorphan (PROMETHAZINE-DM) 6.25-15 mg/5 mL Syrp; Take 5 mLs by mouth every 8 (eight) hours as needed (COUGH).  Dispense: 118 mL; Refill: 0    Bronchospasm  -     dexAMETHasone injection 10 mg  -     levalbuterol nebulizer solution 1.25 mg  -     ipratropium 0.02 % nebulizer solution 0.5 mg  -     predniSONE (DELTASONE) 20 MG tablet; Take 1 tablet (20 mg total) by mouth once daily.  Dispense: 3 tablet; Refill: 0  -     albuterol (VENTOLIN HFA) 90 mcg/actuation inhaler; Inhale 2 puffs into the lungs every 4 (four) hours as needed for Wheezing or Shortness of Breath. Rescue  Dispense: 18 g; Refill: 0  -     albuterol (PROVENTIL) 2.5 mg /3 mL (0.083 %) nebulizer solution; Take 3 mLs (2.5 mg total) by nebulization every 6 (six) hours as needed for Wheezing or Shortness of Breath. Rescue  Dispense: 90 each; Refill: 1    Bronchitis  -     predniSONE (DELTASONE) 20 MG tablet; Take 1 tablet (20 mg total) by mouth once daily.  Dispense: 3 tablet; Refill: 0  -     albuterol (VENTOLIN HFA) 90 mcg/actuation inhaler; Inhale 2 puffs into the lungs every 4 (four) hours as needed for Wheezing or Shortness of  Breath. Rescue  Dispense: 18 g; Refill: 0  -     albuterol (PROVENTIL) 2.5 mg /3 mL (0.083 %) nebulizer solution; Take 3 mLs (2.5 mg total) by nebulization every 6 (six) hours as needed for Wheezing or Shortness of Breath. Rescue  Dispense: 90 each; Refill: 1    Abnormal sputum amount  -     azithromycin (Z-GIORGI) 250 MG tablet; Take 2 tablets by mouth on day 1; Take 1 tablet by mouth on days 2-5  Dispense: 6 tablet; Refill: 0  -     albuterol (VENTOLIN HFA) 90 mcg/actuation inhaler; Inhale 2 puffs into the lungs every 4 (four) hours as needed for Wheezing or Shortness of Breath. Rescue  Dispense: 18 g; Refill: 0  -     albuterol (PROVENTIL) 2.5 mg /3 mL (0.083 %) nebulizer solution; Take 3 mLs (2.5 mg total) by nebulization every 6 (six) hours as needed for Wheezing or Shortness of Breath. Rescue  Dispense: 90 each; Refill: 1    Abnormal sputum color  -     azithromycin (Z-GIORGI) 250 MG tablet; Take 2 tablets by mouth on day 1; Take 1 tablet by mouth on days 2-5  Dispense: 6 tablet; Refill: 0  -     albuterol (VENTOLIN HFA) 90 mcg/actuation inhaler; Inhale 2 puffs into the lungs every 4 (four) hours as needed for Wheezing or Shortness of Breath. Rescue  Dispense: 18 g; Refill: 0  -     albuterol (PROVENTIL) 2.5 mg /3 mL (0.083 %) nebulizer solution; Take 3 mLs (2.5 mg total) by nebulization every 6 (six) hours as needed for Wheezing or Shortness of Breath. Rescue  Dispense: 90 each; Refill: 1      Follow up if symptoms worsen or fail to improve, for F/U with PCP or ED. There are no Patient Instructions on file for this visit.

## 2023-05-23 ENCOUNTER — TELEPHONE (OUTPATIENT)
Dept: URGENT CARE | Facility: CLINIC | Age: 53
End: 2023-05-23
Payer: COMMERCIAL

## 2023-07-12 ENCOUNTER — HOSPITAL ENCOUNTER (OUTPATIENT)
Dept: PREADMISSION TESTING | Facility: OTHER | Age: 53
Discharge: HOME | End: 2023-07-12
Attending: ORTHOPAEDIC SURGERY
Payer: COMMERCIAL

## 2023-07-12 ENCOUNTER — ANESTHESIA EVENT (OUTPATIENT)
Dept: SURGERY | Facility: OTHER | Age: 53
End: 2023-07-12
Payer: COMMERCIAL

## 2023-07-12 VITALS
OXYGEN SATURATION: 100 % | WEIGHT: 168 LBS | SYSTOLIC BLOOD PRESSURE: 131 MMHG | BODY MASS INDEX: 24.88 KG/M2 | HEIGHT: 69 IN | HEART RATE: 81 BPM | DIASTOLIC BLOOD PRESSURE: 66 MMHG

## 2023-07-12 RX ORDER — SODIUM CHLORIDE, SODIUM LACTATE, POTASSIUM CHLORIDE, CALCIUM CHLORIDE 600; 310; 30; 20 MG/100ML; MG/100ML; MG/100ML; MG/100ML
INJECTION, SOLUTION INTRAVENOUS CONTINUOUS
Status: CANCELLED | OUTPATIENT
Start: 2023-07-12

## 2023-07-12 RX ORDER — LIDOCAINE HYDROCHLORIDE 10 MG/ML
0.5 INJECTION, SOLUTION EPIDURAL; INFILTRATION; INTRACAUDAL; PERINEURAL ONCE
Status: CANCELLED | OUTPATIENT
Start: 2023-07-12 | End: 2023-07-12

## 2023-07-12 RX ORDER — FAMOTIDINE 20 MG/1
20 TABLET, FILM COATED ORAL
Status: CANCELLED | OUTPATIENT
Start: 2023-07-12 | End: 2023-07-12

## 2023-07-12 RX ORDER — ACETAMINOPHEN 325 MG/1
975 TABLET ORAL
Status: CANCELLED | OUTPATIENT
Start: 2023-07-12 | End: 2023-07-12

## 2023-07-12 NOTE — DISCHARGE INSTRUCTIONS
Information to Prepare you for your Surgery    PRE-ADMIT TESTING -  [phone]    [address] [facility]          Your surgery has been scheduled at [facility]. We are pleased to have the opportunity to serve you. For Further Information please call [phone].    On the day of surgery please report to the Information Desk on the 1st floor.    CONTACT YOUR PHYSICIAN'S OFFICE THE DAY PRIOR TO YOUR SURGERY TO OBTAIN YOUR ARRIVAL TIME.     The evening before surgery do not eat anything after 9 p.m. ( this includes hard candy, chewing gum and mints).  You may only have GATORADE, POWERADE AND WATER  from 9 p.m. until you leave your home.   DO NOT DRINK ANY LIQUIDS ON THE WAY TO THE HOSPITAL.      Why does your anesthesiologist allow you to drink Gatorade/Powerade before surgery?  Gatorade/Powerade helps to increase your comfort before surgery and to decrease your nausea after surgery. The carbohydrates in Gatorade/Powerade help reduce your body's stress response to surgery.  If you are a diabetic-drink only water prior to surgery.       Patients may have 2 visitors pre and post procedure. Only 2 visitors will be allowed in the Surgical building with the patient. No one under the age of 12 will be allowed into the facility.    SPECIAL MEDICATION INSTRUCTIONS: TAKE medications checked off by the Anesthesiologist on your Medication List.    Angiogram Patients: Take medications as instructed by your physician, including aspirin.     Surgery Patients:    If you take ASPIRIN - Your PHYSICIAN/SURGEON will need to inform you IF/OR when you need to stop taking aspirin prior to your surgery.     The week prior to surgery do not ot take any medications containing IBUPROFEN or NSAIDS ( Advil, Motrin, Goodys, BC, Aleve, Naproxen etc) If you are not sure if you should take a medicine please call your surgeon's office.  Ok to take Tylenol    Do Not Wear any make-up  (especially eye make-up) to surgery. Please remove any false eyelashes or eyelash extensions. If you arrive the day of surgery with makeup/eyelashes on you will be required to remove prior to surgery. (There is a risk of corneal abrasions if eye makeup/eyelash extensions are not removed)      Leave all valuables at home.   Do Not wear any jewelry or watches, including any metal in body piercings. Jewelry must be removed prior to coming to the hospital.  There is a possibility that rings that are unable to be removed may be cut off if they are on the surgical extremity.    Please remove all hair extensions, wigs, clips and any other metal accessories/ ornaments from your hair.  These items may pose a flammable/fire risk in Surgery and must be removed.    Do not shave your surgical area at least 5 days prior to your surgery. The surgical prep will be performed at the hospital according to Infection Control regulations.    Contact Lens must be removed before surgery. Either do not wear the contact lens or bring a case and solution for storage.  Please bring a container for eyeglasses or dentures as required.  Bring any paperwork your physician has provided, such as consent forms,  history and physicals, doctor's orders, etc.   Bring comfortable clothes that are loose fitting to wear upon discharge. Take into consideration the type of surgery being performed.  Maintain your diet as advised per your physician the day prior to surgery.      Adequate rest the night before surgery is advised.   Park in the Parking lot behind the hospital or in the [facility] across the street from the parking lot. Parking is complimentary.  If you will be discharged the same day as your procedure, please arrange for a responsible adult to drive you home or to accompany you if traveling by taxi.   YOU WILL NOT BE PERMITTED TO DRIVE OR TO LEAVE THE HOSPITAL ALONE AFTER SURGERY.   If you are being discharged the same day, it is  strongly recommended that you arrange for someone to remain with you for the first 24 hrs following your surgery.    The Surgeon will speak to your family/visitor after your surgery regarding the outcome of your surgery and post op care.  The Surgeon may speak to you after your surgery, but there is a possibility you may not remember the details.  Please check with your family members regarding the conversation with the Surgeon.    We strongly recommend whoever is bringing you home be present for discharge instructions.  This will ensure a thorough understanding for your post op home care.    ALL CHILDREN MUST ALWAYS BE ACCOMPANIED BY AN ADULT.    Visitors-Refer to current Visitor policy handouts.    Thank you for your cooperation.  The Staff of [facility].            Bathing Instructions with Hibiclens    Shower the evening before and morning of your procedure with Chlorhexidine (Hibiclens)  do not use Chlorhexidine on your face or genitals. Do not get in your eyes.  Wash your face with water and your regular face wash/soap  Use your regular shampoo  Apply Chlorhexidine (Hibiclens) directly on your skin or on a wet washcloth and wash gently. When showering: Move away from the shower stream when applying Chlorhexidine (Hibiclens) to avoid rinsing off too soon.  Rinse thoroughly with warm water  Do not dilute Chlorhexidine (Hibiclens)   Dry off as usual, do not use any deodorant, powder, body lotions, perfume, after shave or cologne.

## 2023-07-12 NOTE — ANESTHESIA PREPROCEDURE EVALUATION
07/12/2023  Kristina Graham is a 52 y.o., female.      Pre-op Assessment    I have reviewed the Patient Summary Reports.     I have reviewed the Nursing Notes. I have reviewed the NPO Status.   I have reviewed the Medications.     Review of Systems  Anesthesia Hx:  No problems with previous Anesthesia    Social:  Non-Smoker    Hematology/Oncology:  Hematology Normal   Oncology Normal     EENT/Dental:EENT/Dental Normal   Cardiovascular:   Exercise tolerance: good hyperlipidemia    Pulmonary:  Pulmonary Normal    Renal/:  Renal/ Normal     Hepatic/GI:  Hepatic/GI Normal    Musculoskeletal:   Rheumatoid arthritis   Neurological:  Neurology Normal    Endocrine:   S/p thyroidectomy 6 yrs ago.     Psych:  Psychiatric Normal           Physical Exam  General: Well nourished, Cooperative and Alert    Airway:  Mallampati: II   Mouth Opening: Normal  TM Distance: Normal  Tongue: Normal  Neck ROM: Normal ROM    Dental:  Intact        Anesthesia Plan  Type of Anesthesia, risks & benefits discussed:    Anesthesia Type: Gen Supraglottic Airway  Intra-op Monitoring Plan: Standard ASA Monitors  Post Op Pain Control Plan: multimodal analgesia and peripheral nerve block  Induction:  IV  Informed Consent: Informed consent signed with the Patient and all parties understand the risks and agree with anesthesia plan.  All questions answered.   ASA Score: 2  Anesthesia Plan Notes: Has recent labs, discussed block if ordered.   Healthy active.   Pt on ozempic.    Ready For Surgery From Anesthesia Perspective.     .

## 2023-07-13 ENCOUNTER — ANESTHESIA (OUTPATIENT)
Dept: SURGERY | Facility: OTHER | Age: 53
End: 2023-07-13
Payer: COMMERCIAL

## 2023-07-13 ENCOUNTER — HOSPITAL ENCOUNTER (OUTPATIENT)
Facility: OTHER | Age: 53
Discharge: HOME | End: 2023-07-13
Attending: ORTHOPAEDIC SURGERY | Admitting: ORTHOPAEDIC SURGERY
Payer: COMMERCIAL

## 2023-07-13 VITALS
DIASTOLIC BLOOD PRESSURE: 63 MMHG | RESPIRATION RATE: 16 BRPM | TEMPERATURE: 98 F | HEART RATE: 65 BPM | SYSTOLIC BLOOD PRESSURE: 117 MMHG | OXYGEN SATURATION: 100 %

## 2023-07-13 DIAGNOSIS — M65.879 OTHER SYNOVITIS AND TENOSYNOVITIS, UNSPECIFIED ANKLE AND FOOT: Primary | ICD-10-CM

## 2023-07-13 LAB
B-HCG UR QL: NEGATIVE
CTP QC/QA: YES

## 2023-07-13 PROCEDURE — 76942 PERIPHERAL BLOCK: ICD-10-PCS | Mod: 26,,, | Performed by: ANESTHESIOLOGY

## 2023-07-13 PROCEDURE — 71000016 HC POSTOP RECOV ADDL HR: Performed by: ORTHOPAEDIC SURGERY

## 2023-07-13 PROCEDURE — D9220A PRA ANESTHESIA: Mod: ANES,,, | Performed by: ANESTHESIOLOGY

## 2023-07-13 PROCEDURE — 63600175 PHARM REV CODE 636 W HCPCS: Performed by: ANESTHESIOLOGY

## 2023-07-13 PROCEDURE — D9220A PRA ANESTHESIA: ICD-10-PCS | Mod: CRNA,,, | Performed by: NURSE ANESTHETIST, CERTIFIED REGISTERED

## 2023-07-13 PROCEDURE — D9220A PRA ANESTHESIA: ICD-10-PCS | Mod: ANES,,, | Performed by: ANESTHESIOLOGY

## 2023-07-13 PROCEDURE — 71000033 HC RECOVERY, INTIAL HOUR: Performed by: ORTHOPAEDIC SURGERY

## 2023-07-13 PROCEDURE — 36000709 HC OR TIME LEV III EA ADD 15 MIN: Performed by: ORTHOPAEDIC SURGERY

## 2023-07-13 PROCEDURE — 81025 URINE PREGNANCY TEST: CPT | Performed by: ANESTHESIOLOGY

## 2023-07-13 PROCEDURE — 63600175 PHARM REV CODE 636 W HCPCS

## 2023-07-13 PROCEDURE — 37000009 HC ANESTHESIA EA ADD 15 MINS: Performed by: ORTHOPAEDIC SURGERY

## 2023-07-13 PROCEDURE — 37000008 HC ANESTHESIA 1ST 15 MINUTES: Performed by: ORTHOPAEDIC SURGERY

## 2023-07-13 PROCEDURE — 25000003 PHARM REV CODE 250: Performed by: NURSE ANESTHETIST, CERTIFIED REGISTERED

## 2023-07-13 PROCEDURE — 64445 NJX AA&/STRD SCIATIC NRV IMG: CPT | Mod: 59,LT | Performed by: ANESTHESIOLOGY

## 2023-07-13 PROCEDURE — 64450 NJX AA&/STRD OTHER PN/BRANCH: CPT | Mod: 59,RT,, | Performed by: ANESTHESIOLOGY

## 2023-07-13 PROCEDURE — 71000015 HC POSTOP RECOV 1ST HR: Performed by: ORTHOPAEDIC SURGERY

## 2023-07-13 PROCEDURE — 36000708 HC OR TIME LEV III 1ST 15 MIN: Performed by: ORTHOPAEDIC SURGERY

## 2023-07-13 PROCEDURE — 63600175 PHARM REV CODE 636 W HCPCS: Performed by: NURSE ANESTHETIST, CERTIFIED REGISTERED

## 2023-07-13 PROCEDURE — 25000003 PHARM REV CODE 250: Performed by: ANESTHESIOLOGY

## 2023-07-13 PROCEDURE — 76942 ECHO GUIDE FOR BIOPSY: CPT | Mod: 26,,, | Performed by: ANESTHESIOLOGY

## 2023-07-13 PROCEDURE — 64450 PERIPHERAL BLOCK: ICD-10-PCS | Mod: 59,RT,, | Performed by: ANESTHESIOLOGY

## 2023-07-13 PROCEDURE — 63600175 PHARM REV CODE 636 W HCPCS: Performed by: ORTHOPAEDIC SURGERY

## 2023-07-13 PROCEDURE — D9220A PRA ANESTHESIA: Mod: CRNA,,, | Performed by: NURSE ANESTHETIST, CERTIFIED REGISTERED

## 2023-07-13 PROCEDURE — 27201423 OPTIME MED/SURG SUP & DEVICES STERILE SUPPLY: Performed by: ORTHOPAEDIC SURGERY

## 2023-07-13 RX ORDER — PROPOFOL 10 MG/ML
VIAL (ML) INTRAVENOUS
Status: DISCONTINUED | OUTPATIENT
Start: 2023-07-13 | End: 2023-07-13

## 2023-07-13 RX ORDER — LIDOCAINE HYDROCHLORIDE 10 MG/ML
0.5 INJECTION, SOLUTION EPIDURAL; INFILTRATION; INTRACAUDAL; PERINEURAL ONCE
Status: DISCONTINUED | OUTPATIENT
Start: 2023-07-13 | End: 2023-07-13 | Stop reason: HOSPADM

## 2023-07-13 RX ORDER — OXYCODONE HYDROCHLORIDE 5 MG/1
5 TABLET ORAL
Status: DISCONTINUED | OUTPATIENT
Start: 2023-07-13 | End: 2023-07-13 | Stop reason: SDUPTHER

## 2023-07-13 RX ORDER — MEPERIDINE HYDROCHLORIDE 25 MG/ML
12.5 INJECTION INTRAMUSCULAR; INTRAVENOUS; SUBCUTANEOUS ONCE AS NEEDED
Status: DISCONTINUED | OUTPATIENT
Start: 2023-07-13 | End: 2023-07-13 | Stop reason: HOSPADM

## 2023-07-13 RX ORDER — PROPOFOL 10 MG/ML
VIAL (ML) INTRAVENOUS CONTINUOUS PRN
Status: DISCONTINUED | OUTPATIENT
Start: 2023-07-13 | End: 2023-07-13

## 2023-07-13 RX ORDER — MEPERIDINE HYDROCHLORIDE 25 MG/ML
12.5 INJECTION INTRAMUSCULAR; INTRAVENOUS; SUBCUTANEOUS ONCE AS NEEDED
Status: DISCONTINUED | OUTPATIENT
Start: 2023-07-13 | End: 2023-07-13 | Stop reason: SDUPTHER

## 2023-07-13 RX ORDER — FAMOTIDINE 20 MG/1
20 TABLET, FILM COATED ORAL
Status: COMPLETED | OUTPATIENT
Start: 2023-07-13 | End: 2023-07-13

## 2023-07-13 RX ORDER — ONDANSETRON 4 MG/1
8 TABLET, ORALLY DISINTEGRATING ORAL EVERY 6 HOURS PRN
Qty: 20 TABLET | Refills: 0 | Status: SHIPPED | OUTPATIENT
Start: 2023-07-13 | End: 2023-12-20

## 2023-07-13 RX ORDER — PROCHLORPERAZINE EDISYLATE 5 MG/ML
5 INJECTION INTRAMUSCULAR; INTRAVENOUS EVERY 30 MIN PRN
Status: DISCONTINUED | OUTPATIENT
Start: 2023-07-13 | End: 2023-07-13 | Stop reason: HOSPADM

## 2023-07-13 RX ORDER — SODIUM CHLORIDE 0.9 % (FLUSH) 0.9 %
3 SYRINGE (ML) INJECTION
Status: DISCONTINUED | OUTPATIENT
Start: 2023-07-13 | End: 2023-07-13 | Stop reason: SDUPTHER

## 2023-07-13 RX ORDER — HYDROMORPHONE HYDROCHLORIDE 2 MG/ML
0.4 INJECTION, SOLUTION INTRAMUSCULAR; INTRAVENOUS; SUBCUTANEOUS EVERY 5 MIN PRN
Status: DISCONTINUED | OUTPATIENT
Start: 2023-07-13 | End: 2023-07-13 | Stop reason: SDUPTHER

## 2023-07-13 RX ORDER — PROCHLORPERAZINE EDISYLATE 5 MG/ML
5 INJECTION INTRAMUSCULAR; INTRAVENOUS EVERY 30 MIN PRN
Status: DISCONTINUED | OUTPATIENT
Start: 2023-07-13 | End: 2023-07-13 | Stop reason: SDUPTHER

## 2023-07-13 RX ORDER — FENTANYL CITRATE 50 UG/ML
INJECTION, SOLUTION INTRAMUSCULAR; INTRAVENOUS
Status: DISCONTINUED | OUTPATIENT
Start: 2023-07-13 | End: 2023-07-13

## 2023-07-13 RX ORDER — ROPIVACAINE HYDROCHLORIDE 5 MG/ML
INJECTION, SOLUTION EPIDURAL; INFILTRATION; PERINEURAL
Status: COMPLETED | OUTPATIENT
Start: 2023-07-13 | End: 2023-07-13

## 2023-07-13 RX ORDER — MIDAZOLAM HYDROCHLORIDE 1 MG/ML
INJECTION INTRAMUSCULAR; INTRAVENOUS
Status: DISCONTINUED | OUTPATIENT
Start: 2023-07-13 | End: 2023-07-13

## 2023-07-13 RX ORDER — HYDROMORPHONE HYDROCHLORIDE 2 MG/ML
0.4 INJECTION, SOLUTION INTRAMUSCULAR; INTRAVENOUS; SUBCUTANEOUS EVERY 5 MIN PRN
Status: DISCONTINUED | OUTPATIENT
Start: 2023-07-13 | End: 2023-07-13 | Stop reason: HOSPADM

## 2023-07-13 RX ORDER — SODIUM CHLORIDE, SODIUM LACTATE, POTASSIUM CHLORIDE, CALCIUM CHLORIDE 600; 310; 30; 20 MG/100ML; MG/100ML; MG/100ML; MG/100ML
INJECTION, SOLUTION INTRAVENOUS CONTINUOUS
Status: DISCONTINUED | OUTPATIENT
Start: 2023-07-13 | End: 2023-07-13 | Stop reason: HOSPADM

## 2023-07-13 RX ORDER — DEXAMETHASONE SODIUM PHOSPHATE 4 MG/ML
INJECTION, SOLUTION INTRA-ARTICULAR; INTRALESIONAL; INTRAMUSCULAR; INTRAVENOUS; SOFT TISSUE
Status: DISCONTINUED | OUTPATIENT
Start: 2023-07-13 | End: 2023-07-13

## 2023-07-13 RX ORDER — KETOROLAC TROMETHAMINE 30 MG/ML
INJECTION, SOLUTION INTRAMUSCULAR; INTRAVENOUS
Status: DISCONTINUED | OUTPATIENT
Start: 2023-07-13 | End: 2023-07-13

## 2023-07-13 RX ORDER — LIDOCAINE HYDROCHLORIDE 20 MG/ML
INJECTION INTRAVENOUS
Status: DISCONTINUED | OUTPATIENT
Start: 2023-07-13 | End: 2023-07-13

## 2023-07-13 RX ORDER — OXYCODONE AND ACETAMINOPHEN 5; 325 MG/1; MG/1
1-2 TABLET ORAL EVERY 4 HOURS PRN
Qty: 30 TABLET | Refills: 0 | Status: SHIPPED | OUTPATIENT
Start: 2023-07-13 | End: 2023-12-20

## 2023-07-13 RX ORDER — SODIUM CHLORIDE 0.9 % (FLUSH) 0.9 %
3 SYRINGE (ML) INJECTION
Status: DISCONTINUED | OUTPATIENT
Start: 2023-07-13 | End: 2023-07-13 | Stop reason: HOSPADM

## 2023-07-13 RX ORDER — ONDANSETRON 2 MG/ML
INJECTION INTRAMUSCULAR; INTRAVENOUS
Status: DISCONTINUED | OUTPATIENT
Start: 2023-07-13 | End: 2023-07-13

## 2023-07-13 RX ORDER — ACETAMINOPHEN 325 MG/1
975 TABLET ORAL
Status: COMPLETED | OUTPATIENT
Start: 2023-07-13 | End: 2023-07-13

## 2023-07-13 RX ORDER — CEFAZOLIN SODIUM 1 G/3ML
2 INJECTION, POWDER, FOR SOLUTION INTRAMUSCULAR; INTRAVENOUS
Status: COMPLETED | OUTPATIENT
Start: 2023-07-13 | End: 2023-07-13

## 2023-07-13 RX ORDER — OXYCODONE HYDROCHLORIDE 5 MG/1
5 TABLET ORAL
Status: DISCONTINUED | OUTPATIENT
Start: 2023-07-13 | End: 2023-07-13 | Stop reason: HOSPADM

## 2023-07-13 RX ADMIN — KETOROLAC TROMETHAMINE 30 MG: 30 INJECTION, SOLUTION INTRAMUSCULAR; INTRAVENOUS at 08:07

## 2023-07-13 RX ADMIN — SODIUM CHLORIDE, SODIUM LACTATE, POTASSIUM CHLORIDE, AND CALCIUM CHLORIDE: 600; 310; 30; 20 INJECTION, SOLUTION INTRAVENOUS at 08:07

## 2023-07-13 RX ADMIN — SODIUM CHLORIDE, SODIUM LACTATE, POTASSIUM CHLORIDE, AND CALCIUM CHLORIDE: 600; 310; 30; 20 INJECTION, SOLUTION INTRAVENOUS at 06:07

## 2023-07-13 RX ADMIN — PROPOFOL 150 MCG/KG/MIN: 10 INJECTION, EMULSION INTRAVENOUS at 07:07

## 2023-07-13 RX ADMIN — ROPIVACAINE HYDROCHLORIDE 30 ML: 5 INJECTION, SOLUTION EPIDURAL; INFILTRATION; PERINEURAL at 07:07

## 2023-07-13 RX ADMIN — CEFAZOLIN 2 G: 1 INJECTION, POWDER, FOR SOLUTION INTRAMUSCULAR; INTRAVENOUS; PARENTERAL at 07:07

## 2023-07-13 RX ADMIN — GLYCOPYRROLATE 0.1 MG: 0.2 INJECTION, SOLUTION INTRAMUSCULAR; INTRAVITREAL at 07:07

## 2023-07-13 RX ADMIN — ONDANSETRON 4 MG: 2 INJECTION INTRAMUSCULAR; INTRAVENOUS at 07:07

## 2023-07-13 RX ADMIN — MIDAZOLAM HYDROCHLORIDE 2 MG: 1 INJECTION, SOLUTION INTRAMUSCULAR; INTRAVENOUS at 06:07

## 2023-07-13 RX ADMIN — FENTANYL CITRATE 100 MCG: 0.05 INJECTION, SOLUTION INTRAMUSCULAR; INTRAVENOUS at 06:07

## 2023-07-13 RX ADMIN — LIDOCAINE HYDROCHLORIDE 75 MG: 20 INJECTION, SOLUTION INTRAVENOUS at 07:07

## 2023-07-13 RX ADMIN — DEXAMETHASONE SODIUM PHOSPHATE 8 MG: 4 INJECTION, SOLUTION INTRA-ARTICULAR; INTRALESIONAL; INTRAMUSCULAR; INTRAVENOUS; SOFT TISSUE at 07:07

## 2023-07-13 RX ADMIN — PROPOFOL 180 MG: 10 INJECTION, EMULSION INTRAVENOUS at 07:07

## 2023-07-13 RX ADMIN — FAMOTIDINE 20 MG: 20 TABLET, FILM COATED ORAL at 06:07

## 2023-07-13 RX ADMIN — ACETAMINOPHEN 975 MG: 325 TABLET, FILM COATED ORAL at 06:07

## 2023-07-13 NOTE — CHAPLAIN
07/13/23 1148   Clinical Encounter Type   Visit Type Initial Visit   Visit Category Post-Op   Visited With Patient;Health care provider   Length of Visit 15 Minutes   Patient Spiritual Encounters   Care Provided Reflective listening;Compassionate presence;Prayer support   Patient Coping Accepting;Family/ friends resources;Using christina/ community resources

## 2023-07-13 NOTE — PLAN OF CARE
Kristina Graham has met all discharge criteria from Phase II. Vital Signs are stable, ambulating  without difficulty. Discharge instructions given, patient verbalized understanding. Discharged from facility via wheelchair in stable condition.

## 2023-07-13 NOTE — ANESTHESIA PROCEDURE NOTES
Peripheral Block    Patient location during procedure: pre-op   Block not for primary anesthetic.  Reason for block: at surgeon's request and post-op pain management   Post-op Pain Location: right ankle pain   Timeout: 7/13/2023 7:00 AM   End time: 7/13/2023 7:02 AM    Staffing  Authorizing Provider: Blaise Ryan MD  Performing Provider: Blaise Ryan MD    Preanesthetic Checklist  Completed: patient identified, IV checked, site marked, risks and benefits discussed, surgical consent, monitors and equipment checked, pre-op evaluation and timeout performed  Peripheral Block  Patient position: supine  Prep: ChloraPrep  Patient monitoring: heart rate, cardiac monitor, continuous pulse ox, continuous capnometry and frequent blood pressure checks  Block type: popliteal  Laterality: right  Injection technique: single shot  Needle  Needle type: Stimuplex   Needle gauge: 21 G  Needle length: 4 in  Needle localization: anatomical landmarks and ultrasound guidance   -ultrasound image captured on disc.  Assessment  Injection assessment: negative aspiration, negative parasthesia and local visualized surrounding nerve  Paresthesia pain: none  Heart rate change: no  Slow fractionated injection: yes  Pain Tolerance: comfortable throughout block and no complaints  Medications:    Medications: ropivacaine (NAROPIN) injection 0.5% - Perineural   30 mL - 7/13/2023 7:02:00 AM    Additional Notes  VSS.  [facility] RN monitoring vitals throughout procedure.  Patient tolerated procedure well.

## 2023-07-13 NOTE — ANESTHESIA POSTPROCEDURE EVALUATION
Anesthesia Post Evaluation    Patient: Kristina Graham    Procedure(s) Performed: Procedure(s) (LRB):  REPAIR, TENDON, LOWER EXTREMITY (Right)    Final Anesthesia Type: general      Patient location during evaluation: PACU  Patient participation: Yes- Able to Participate  Level of consciousness: awake and alert  Post-procedure vital signs: reviewed and stable  Pain management: adequate  Airway patency: patent    PONV status at discharge: No PONV  Anesthetic complications: no      Cardiovascular status: blood pressure returned to baseline  Respiratory status: unassisted and spontaneous ventilation  Hydration status: euvolemic  Follow-up not needed.          Vitals Value Taken Time   /59 07/13/23 0902   Temp 36.4 °C (97.5 °F) 07/13/23 0841   Pulse 69 07/13/23 0916   Resp 20 07/13/23 0900   SpO2 100 % 07/13/23 0916   Vitals shown include unvalidated device data.      No case tracking events are documented in the log.      Pain/Reyes Score: Pain Rating Prior to Med Admin: 0 (7/13/2023  6:23 AM)  Reyes Score: 10 (7/13/2023  9:11 AM)

## 2023-07-13 NOTE — TRANSFER OF CARE
Anesthesia Transfer of Care Note    Patient: Kristina Graham    Procedure(s) Performed: Procedure(s) (LRB):  REPAIR, TENDON, LOWER EXTREMITY (Right)    Patient location: PACU    Anesthesia Type: general    Transport from OR: Transported from OR on room air with adequate spontaneous ventilation    Post pain: adequate analgesia    Post assessment: no apparent anesthetic complications and tolerated procedure well    Post vital signs: stable    Level of consciousness: awake and alert    Nausea/Vomiting: no nausea/vomiting    Complications: none    Transfer of care protocol was followed      Last vitals:   Visit Vitals  /80 (BP Location: Right arm, Patient Position: Lying)   Pulse 72   Temp 36.9 °C (98.5 °F) (Oral)   Resp 20   LMP 05/01/2023   SpO2 99%   Breastfeeding No

## 2023-07-13 NOTE — OP NOTE
[facility] - Surgery ([place])  Operative Note     SUMMARY     Surgery Date: 7/13/2023     Surgeon(s) and Role:     * Jessee HENRIQUEZ II(Field) MD Eugenio - Primary    Assisting Surgeon: None    Pre-op Diagnosis:  Other synovitis and tenosynovitis, unspecified ankle and foot [M65.879]    Post-op Diagnosis:  Post-Op Diagnosis Codes:     * Other synovitis and tenosynovitis, unspecified ankle and foot [M65.879]    Procedure(s) (LRB):  REPAIR, TENDON, LOWER EXTREMITY (Right)    Procedure:  Right peroneus brevis tendon repair   Right peroneus longus tenosynovectomy  Reconstruction right inferior peroneal retinaculum    Anesthesia: General    Indication for Procedure:  52-year-old female with about a six-month history of lateral ankle and hindfoot pain and swelling has failed conservative treatment consisting of immobilization activity modification shoe wear modification and physical therapy for her MRI confirmed peroneus brevis tendon tear.  After consideration of the risks benefits and alternatives she elected to proceed with the above procedures.    Procedure in Detail:  After appropriate consents were obtained and confirmed the patient was taken to the operating room and placed under general endotracheal anesthesia.  The right lower extremity was prepped and draped in the usual sterile fashion a tourniquet was applied about the right thigh and inflated to 300 mmHg after exsanguination with an Esmarch bandage.  Longitudinal incision was made along the distal course of the peroneal tendons just posterior to the lateral malleolus and extending distally.  Sharp dissection was carried through the skin only.  Meticulous blunt dissection was then carried deep.  Neurovascular structures were identified and excellent hemostasis was obtained on the approach.  Access to the peroneal tendons was encountered just inferior to the malleolus with substantial tendinosis and degenerative changes of the tendons evident.  The inferior peroneal  retinaculum was incised along the course of the tendons and the tendons were delivered into the wound.  Substantial redundancy to the inferior peroneal retinaculum was encountered as the brevis tendon was substantially torn starting approximately 3 cm proximal to the lateral malleolus and extending distally past the peroneal tubercle.  Entire length of the tear was about 6 cm in length.  Substantial tendon redundancy and tendinosis was encountered that encapsulated the peroneus brevis and longus tendons throughout the course extending proximally past the superior peroneal retinaculum.  The superior peroneal retinaculum was then released proximally and dissection continued with the tendons delivered into the wound.  The redundant torn portions of the peroneus brevis tendon was longitudinally excised and the substantial tenosynovitis of the longus tendon was also excised.  Part of the low hanging brevis muscle belly was also removed provide room for the repair.  Upon completion of the excision of the redundant tissue and tenosynovectomy, the peroneus brevis tendon was then repaired in a tubularization fashion using 0 Vicryl suture.  Excellent tendon contours were reconstruction and excellent repair was noted.  Upon completion of the brevis repair and longus tenosynovectomy the tendons were then reduced behind the fibula with good excursion noted.  No peroneal instability was elicited however the substantial redundancy to the inferior peroneal retinaculum was encountered.  Wound was then copiously irrigated normal saline and the superior peroneal retinaculum was repaired with 0 Vicryl suture in a pants-over-vest fashion.  The redundant inferior peroneal retinaculum was reconstructed also in a pants-over-vest fashion with excision of the redundant tissue and primary repair using variable 0 Vicryl suture.  Upon completion of the peroneal repairs good tendon excursion was noted with no peroneal instability or tightness  elicited.  The wound was then again copiously irrigated with normal saline and the skin was closed in layers with 0 and 2-0 Vicryl 3-0 Monocryl and 3-0 nylon sutures.  Sterile dressings were applied followed by a posterior splint with stirrups and the patient was awakened and taken to the recovery room in good condition.  The procedure was well tolerated all counts were correct and there were no complications.    Estimated Blood Loss: * No values recorded between 7/13/2023  7:46 AM and 7/13/2023  8:39 AM *         Specimens:   Specimen (24h ago, onward)      None            Discharge Note    SUMMARY     Admit Date: 7/13/2023    Discharge Date and Time: No discharge date for patient encounter.    Hospital Course (synopsis of major diagnoses, care, treatment, and services provided during the course of the hospital stay):  Outpatient surgery     Final Diagnosis: Post-Op Diagnosis Codes:     * Other synovitis and tenosynovitis, unspecified ankle and foot [M65.879]    Disposition: Home or Self Care    Follow Up/Patient Instructions:     Medications:  Reconciled Home Medications:      Medication List        START taking these medications      oxyCODONE-acetaminophen 5-325 mg per tablet  Commonly known as: PERCOCET  Take 1-2 tablets by mouth every 4 (four) hours as needed for Pain.            CONTINUE taking these medications      atorvastatin 10 MG tablet  Commonly known as: LIPITOR  Take 10 mg by mouth once daily.     calcitRIOL 0.5 MCG Cap  Commonly known as: ROCALTROL  Take 1 capsule by mouth 2 (two) times daily.     clindamycin-benzoyl peroxide gel  Apply topically every morning.     cyanocobalamin 1,000 mcg/mL injection  Inject 1 mL (1,000 mcg total) into the muscle every 30 days.     etodolac 400 MG 24 hr tablet  Commonly known as: LODINE XL  Take 1 tablet (400 mg total) by mouth once daily.     levothyroxine 88 mcg Cap  Commonly known as: TIROSINT  Take 88 mcg by mouth Daily.     lifitegrast 5 % Dpet  Commonly  known as: XIIDRA  Place 1 drop into both eyes 2 (two) times daily.     magnesium oxide 400 mg (241.3 mg magnesium) tablet  Commonly known as: MAG-OX  Take 400 mg by mouth once daily.     OZEMPIC 1 mg/dose (4 mg/3 mL)  Generic drug: semaglutide     spironolactone 100 MG tablet  Commonly known as: ALDACTONE  Take 1 tablet (100 mg total) by mouth once daily.            Discharge Procedure Orders   CRUTCHES FOR HOME USE     Order Specific Question Answer Comments   Type: Axillary    Height: 69    Weight: 2    Length of need (1-99 months): 99      Diet general     Leave dressing on - Keep it clean, dry, and intact until clinic visit     Call MD for:  temperature >100.4     Call MD for:  persistent nausea and vomiting     Call MD for:  severe uncontrolled pain     Call MD for:  difficulty breathing, headache or visual disturbances     Call MD for:  redness, tenderness, or signs of infection (pain, swelling, redness, odor or green/yellow discharge around incision site)     Call MD for:  hives     Call MD for:  persistent dizziness or light-headedness     Non weight bearing     @[facility]

## 2023-08-18 DIAGNOSIS — M25.572 CHRONIC PAIN OF BOTH ANKLES: ICD-10-CM

## 2023-08-18 DIAGNOSIS — G89.29 CHRONIC PAIN OF BOTH ANKLES: ICD-10-CM

## 2023-08-18 DIAGNOSIS — M79.644 BILATERAL THUMB PAIN: ICD-10-CM

## 2023-08-18 DIAGNOSIS — M25.571 CHRONIC PAIN OF BOTH ANKLES: ICD-10-CM

## 2023-08-18 DIAGNOSIS — M79.645 BILATERAL THUMB PAIN: ICD-10-CM

## 2023-08-18 RX ORDER — ETODOLAC 400 MG/1
400 TABLET, EXTENDED RELEASE ORAL
Qty: 90 TABLET | Refills: 0 | Status: SHIPPED | OUTPATIENT
Start: 2023-08-18 | End: 2024-02-01 | Stop reason: SDUPTHER

## 2023-09-14 ENCOUNTER — OFFICE VISIT (OUTPATIENT)
Dept: RHEUMATOLOGY | Facility: CLINIC | Age: 53
End: 2023-09-14
Payer: COMMERCIAL

## 2023-09-14 VITALS
HEART RATE: 79 BPM | HEIGHT: 69 IN | BODY MASS INDEX: 24.44 KG/M2 | WEIGHT: 165 LBS | SYSTOLIC BLOOD PRESSURE: 137 MMHG | DIASTOLIC BLOOD PRESSURE: 72 MMHG

## 2023-09-14 DIAGNOSIS — M79.641 BILATERAL HAND PAIN: ICD-10-CM

## 2023-09-14 DIAGNOSIS — R53.83 FATIGUE, UNSPECIFIED TYPE: ICD-10-CM

## 2023-09-14 DIAGNOSIS — R76.8 RHEUMATOID FACTOR POSITIVE: Primary | ICD-10-CM

## 2023-09-14 DIAGNOSIS — M79.642 BILATERAL HAND PAIN: ICD-10-CM

## 2023-09-14 PROCEDURE — 3008F PR BODY MASS INDEX (BMI) DOCUMENTED: ICD-10-PCS | Mod: CPTII,S$GLB,, | Performed by: INTERNAL MEDICINE

## 2023-09-14 PROCEDURE — 3075F SYST BP GE 130 - 139MM HG: CPT | Mod: CPTII,S$GLB,, | Performed by: INTERNAL MEDICINE

## 2023-09-14 PROCEDURE — 1159F MED LIST DOCD IN RCRD: CPT | Mod: CPTII,S$GLB,, | Performed by: INTERNAL MEDICINE

## 2023-09-14 PROCEDURE — 99214 PR OFFICE/OUTPT VISIT, EST, LEVL IV, 30-39 MIN: ICD-10-PCS | Mod: S$GLB,,, | Performed by: INTERNAL MEDICINE

## 2023-09-14 PROCEDURE — 1160F PR REVIEW ALL MEDS BY PRESCRIBER/CLIN PHARMACIST DOCUMENTED: ICD-10-PCS | Mod: CPTII,S$GLB,, | Performed by: INTERNAL MEDICINE

## 2023-09-14 PROCEDURE — 1160F RVW MEDS BY RX/DR IN RCRD: CPT | Mod: CPTII,S$GLB,, | Performed by: INTERNAL MEDICINE

## 2023-09-14 PROCEDURE — 99999 PR PBB SHADOW E&M-EST. PATIENT-LVL III: CPT | Mod: PBBFAC,,, | Performed by: INTERNAL MEDICINE

## 2023-09-14 PROCEDURE — 3078F PR MOST RECENT DIASTOLIC BLOOD PRESSURE < 80 MM HG: ICD-10-PCS | Mod: CPTII,S$GLB,, | Performed by: INTERNAL MEDICINE

## 2023-09-14 PROCEDURE — 1159F PR MEDICATION LIST DOCUMENTED IN MEDICAL RECORD: ICD-10-PCS | Mod: CPTII,S$GLB,, | Performed by: INTERNAL MEDICINE

## 2023-09-14 PROCEDURE — 99214 OFFICE O/P EST MOD 30 MIN: CPT | Mod: S$GLB,,, | Performed by: INTERNAL MEDICINE

## 2023-09-14 PROCEDURE — 3075F PR MOST RECENT SYSTOLIC BLOOD PRESS GE 130-139MM HG: ICD-10-PCS | Mod: CPTII,S$GLB,, | Performed by: INTERNAL MEDICINE

## 2023-09-14 PROCEDURE — 99999 PR PBB SHADOW E&M-EST. PATIENT-LVL III: ICD-10-PCS | Mod: PBBFAC,,, | Performed by: INTERNAL MEDICINE

## 2023-09-14 PROCEDURE — 3078F DIAST BP <80 MM HG: CPT | Mod: CPTII,S$GLB,, | Performed by: INTERNAL MEDICINE

## 2023-09-14 PROCEDURE — 3008F BODY MASS INDEX DOCD: CPT | Mod: CPTII,S$GLB,, | Performed by: INTERNAL MEDICINE

## 2023-09-14 ASSESSMENT — ROUTINE ASSESSMENT OF PATIENT INDEX DATA (RAPID3)
PATIENT GLOBAL ASSESSMENT SCORE: 1.5
PAIN SCORE: 3.5
MDHAQ FUNCTION SCORE: 0.3
AM STIFFNESS SCORE: 1, YES
PSYCHOLOGICAL DISTRESS SCORE: 1.1
TOTAL RAPID3 SCORE: 2
FATIGUE SCORE: 5.5

## 2023-09-14 NOTE — PROGRESS NOTES
"Subjective:       Patient ID: Kristina Graham is a 52 y.o. female.    Chief Complaint: Joint pain    HPI:  Kristina Graham is a 52 y.o. female with  history of positive RF and joint pain.    Had right ankle surgery peroneus brevis tendonitis.   Pain 5/10 ache in right ankle.  Now wearing a boot.     Notes minimal pain intermittently in hands with swelling.  30 minutes morning stiffness.      History of B12 deficiency, vitamin D deficiency and acne treated with spironolactone.  In 2010 Parvovirus and EBV infections. IgM positive for both.      Due to hypoparathyroid medication being Natpara taken off the market she developed hypocalcemia.  Parathyroid knicked after total thyroidectomy 2/2017.  PTH was elevated recently.  Receiving laser treatment for parathyroid.          Review of Systems   Constitutional:  Negative for fever and unexpected weight change.   HENT:  Negative for mouth sores and trouble swallowing.    Eyes:  Negative for redness.   Respiratory:  Negative for cough and shortness of breath.    Cardiovascular:  Negative for chest pain.   Gastrointestinal:  Negative for constipation and diarrhea.   Genitourinary:  Negative for dysuria and genital sores.   Musculoskeletal:  Negative for arthralgias.   Skin:  Negative for rash.   Neurological:  Negative for headaches.   Hematological:  Does not bruise/bleed easily.   Psychiatric/Behavioral: Negative.           Objective:   /72   Pulse 79   Ht 5' 9" (1.753 m)   Wt 74.8 kg (165 lb)   BMI 24.37 kg/m²      Physical Exam   Constitutional: She is oriented to person, place, and time.   HENT:   Head: Normocephalic and atraumatic.   Eyes: Conjunctivae are normal.   Cardiovascular: Normal rate, regular rhythm and normal heart sounds.   Pulmonary/Chest: Effort normal and breath sounds normal.   Abdominal: Soft. Bowel sounds are normal.   Musculoskeletal:      Cervical back: Normal range of motion.      Comments: 28 joint count: 0 swollen and 0 " tender   Neurological: She is alert and oriented to person, place, and time.   Skin: No erythema.   Psychiatric: Mood and affect normal.         LABS    Component      Latest Ref Rng 2/10/2023 2/15/2023   WBC      3.4 - 10.8 x10E3/uL 7.6     RBC      3.77 - 5.28 x10E6/uL 4.57     Hemoglobin      11.1 - 15.9 g/dL 14.6     Hematocrit      34.0 - 46.6 % 42.6     MCV      79 - 97 fL 93     MCH      26.6 - 33.0 pg 31.9     MCHC      31.5 - 35.7 g/dL 34.3     RDW      11.7 - 15.4 % 12.0     Platelets      150 - 450 x10E3/uL 249     Neutrophils      Not Estab. % 65     Lymph %      Not Estab. % 24     Mono %      Not Estab. % 8     Eosinophil %      Not Estab. % 2     Basophil %      Not Estab. % 1     Neutrophils, Abs      1.4 - 7.0 x10E3/uL 5.0     Lymph #      0.7 - 3.1 x10E3/uL 1.8     Mono #      0.1 - 0.9 x10E3/uL 0.6     Eos #      0.0 - 0.4 x10E3/uL 0.1     Baso #      0.0 - 0.2 x10E3/uL 0.0     Immature Granulocytes      Not Estab. % 0     Immature Grans (Abs)      0.0 - 0.1 x10E3/uL 0.0     Fecal Globin, Insure      Negative   Positive !    CRP      0 - 10 mg/L <1     Sed Rate      0 - 40 mm/hr 2        Legend:  ! Abnormal       Assessment:       1. Joint pain/swelling/stiffness.  Currently with minimal bilateral hand pain.  May be influenced by hypocalcemia  2. S/p Thyroidectomy complicated by hypoparathyroidism and hypocalcemia.  No longer on Natpara.  Taking Calcitriol.  No longer getting parathyroid lasered by primary but primary relocated [place].  3. Positive rheumatoid factor  4. Fatigue   5. Overweight. Improving  6. Vitamin D deficiency.  Currently on OTC 2,000 units daily.  7. History vitamin B12 deficiency   8. Pre-diabetic.  On Ozempic.  Has lost significant weight loss (20 pounds over one year).   9. Right foot/ankle pain.  S/p surgery  Plan:       1. Continue prn use of daily Lodine  2. Consider Voltaren gel topical ankle  3. Labs after healed from surgery  4. Consider capsaicin    RTO 6  months/prn

## 2023-11-05 DIAGNOSIS — E53.8 B12 DEFICIENCY: ICD-10-CM

## 2023-11-06 RX ORDER — CYANOCOBALAMIN 1000 UG/ML
1000 INJECTION, SOLUTION INTRAMUSCULAR; SUBCUTANEOUS
Qty: 3 ML | Refills: 13 | Status: SHIPPED | OUTPATIENT
Start: 2023-11-06

## 2023-12-15 ENCOUNTER — OFFICE VISIT (OUTPATIENT)
Dept: URGENT CARE | Facility: CLINIC | Age: 53
End: 2023-12-15
Payer: COMMERCIAL

## 2023-12-15 VITALS
HEIGHT: 69 IN | RESPIRATION RATE: 18 BRPM | DIASTOLIC BLOOD PRESSURE: 70 MMHG | OXYGEN SATURATION: 99 % | WEIGHT: 165 LBS | BODY MASS INDEX: 24.44 KG/M2 | SYSTOLIC BLOOD PRESSURE: 146 MMHG | TEMPERATURE: 98 F | HEART RATE: 105 BPM

## 2023-12-15 DIAGNOSIS — J02.9 SORE THROAT: Primary | ICD-10-CM

## 2023-12-15 DIAGNOSIS — H61.20 IMPACTED CERUMEN, UNSPECIFIED LATERALITY: ICD-10-CM

## 2023-12-15 DIAGNOSIS — H61.21 HEARING LOSS DUE TO CERUMEN IMPACTION, RIGHT: ICD-10-CM

## 2023-12-15 DIAGNOSIS — H60.501 ACUTE OTITIS EXTERNA OF RIGHT EAR, UNSPECIFIED TYPE: ICD-10-CM

## 2023-12-15 DIAGNOSIS — J02.0 STREP THROAT: ICD-10-CM

## 2023-12-15 DIAGNOSIS — H92.01 RIGHT EAR PAIN: ICD-10-CM

## 2023-12-15 LAB
CTP QC/QA: YES
MOLECULAR STREP A: POSITIVE

## 2023-12-15 PROCEDURE — 99215 PR OFFICE/OUTPT VISIT, EST, LEVL V, 40-54 MIN: ICD-10-PCS | Mod: 25,S$GLB,, | Performed by: PHYSICIAN ASSISTANT

## 2023-12-15 PROCEDURE — 87651 POCT STREP A MOLECULAR: ICD-10-PCS | Mod: QW,S$GLB,, | Performed by: PHYSICIAN ASSISTANT

## 2023-12-15 PROCEDURE — 99215 OFFICE O/P EST HI 40 MIN: CPT | Mod: 25,S$GLB,, | Performed by: PHYSICIAN ASSISTANT

## 2023-12-15 PROCEDURE — 96372 PR INJECTION,THERAP/PROPH/DIAG2ST, IM OR SUBCUT: ICD-10-PCS | Mod: 59,S$GLB,, | Performed by: PHYSICIAN ASSISTANT

## 2023-12-15 PROCEDURE — 69209 REMOVE IMPACTED EAR WAX UNI: CPT | Mod: RT,S$GLB,, | Performed by: PHYSICIAN ASSISTANT

## 2023-12-15 PROCEDURE — 69209 EAR CERUMEN REMOVAL: ICD-10-PCS | Mod: RT,S$GLB,, | Performed by: PHYSICIAN ASSISTANT

## 2023-12-15 PROCEDURE — 96372 THER/PROPH/DIAG INJ SC/IM: CPT | Mod: 59,S$GLB,, | Performed by: PHYSICIAN ASSISTANT

## 2023-12-15 PROCEDURE — 87651 STREP A DNA AMP PROBE: CPT | Mod: QW,S$GLB,, | Performed by: PHYSICIAN ASSISTANT

## 2023-12-15 RX ORDER — AMOXICILLIN AND CLAVULANATE POTASSIUM 875; 125 MG/1; MG/1
1 TABLET, FILM COATED ORAL EVERY 12 HOURS
Qty: 20 TABLET | Refills: 0 | Status: SHIPPED | OUTPATIENT
Start: 2023-12-15 | End: 2024-01-19

## 2023-12-15 RX ORDER — OFLOXACIN 3 MG/ML
5 SOLUTION AURICULAR (OTIC) 2 TIMES DAILY
Qty: 5 ML | Refills: 0 | Status: SHIPPED | OUTPATIENT
Start: 2023-12-15 | End: 2023-12-22

## 2023-12-15 RX ORDER — DEXAMETHASONE SODIUM PHOSPHATE 100 MG/10ML
10 INJECTION INTRAMUSCULAR; INTRAVENOUS ONCE
Status: COMPLETED | OUTPATIENT
Start: 2023-12-15 | End: 2023-12-15

## 2023-12-15 RX ADMIN — DEXAMETHASONE SODIUM PHOSPHATE 10 MG: 100 INJECTION INTRAMUSCULAR; INTRAVENOUS at 06:12

## 2023-12-15 NOTE — PROGRESS NOTES
"Subjective:      Patient ID: Kristina Graham is a 53 y.o. female.    Vitals:  height is 5' 9" (1.753 m) and weight is 74.8 kg (165 lb). Her oral temperature is 98.1 °F (36.7 °C). Her blood pressure is 146/70 (abnormal) and her pulse is 105. Her respiration is 18 and oxygen saturation is 99%.     Chief Complaint: Sore Throat    53 year old female patient presenting with sore throat since yesterday.  Patient also complains of moderate to severe right ear pain.    Sore Throat   This is a new problem. The current episode started yesterday. The problem has been gradually worsening. Neither side of throat is experiencing more pain than the other. There has been no fever. The pain is at a severity of 8/10. The pain is moderate. Associated symptoms include congestion, coughing, ear pain, headaches, a plugged ear sensation and trouble swallowing. Pertinent negatives include no hoarse voice or neck pain. She has had no exposure to strep or mono. Treatments tried: advil.       HENT:  Positive for ear pain, congestion, sore throat and trouble swallowing.    Neck: Negative for neck pain.   Respiratory:  Positive for cough.    Skin:  Negative for erythema.   Neurological:  Positive for headaches.      Objective:     Physical Exam   Constitutional: She is oriented to person, place, and time. She appears well-developed. She is cooperative.  Non-toxic appearance. She does not appear ill. No distress.   HENT:   Head: Normocephalic and atraumatic.   Ears:   Right Ear: Hearing normal. impacted cerumen  Left Ear: Hearing, tympanic membrane, external ear and ear canal normal.   Nose: Congestion present. No mucosal edema, rhinorrhea or nasal deformity. No epistaxis. Right sinus exhibits no maxillary sinus tenderness and no frontal sinus tenderness. Left sinus exhibits no maxillary sinus tenderness and no frontal sinus tenderness.   Mouth/Throat: Uvula is midline and mucous membranes are normal. No trismus in the jaw. Normal " "dentition. No uvula swelling. Oropharyngeal exudate and posterior oropharyngeal erythema present. No posterior oropharyngeal edema.   Eyes: Conjunctivae, EOM and lids are normal. Pupils are equal, round, and reactive to light. Right eye exhibits no discharge. Left eye exhibits no discharge. No scleral icterus.   Neck: Trachea normal and phonation normal. Neck supple. No JVD present. No tracheal deviation present. No thyromegaly present. No edema present. No erythema present. No neck rigidity present.   Cardiovascular: Normal rate, regular rhythm, normal heart sounds and normal pulses.   No murmur heard.Exam reveals no gallop and no friction rub.   Pulmonary/Chest: Effort normal and breath sounds normal. No stridor. No respiratory distress. She has no decreased breath sounds. She has no wheezes. She has no rhonchi. She has no rales. She exhibits no tenderness.   Abdominal: Normal appearance. She exhibits no distension. Soft. There is no abdominal tenderness. There is no rebound and no guarding.   Musculoskeletal: Normal range of motion.         General: No deformity. Normal range of motion.   Neurological: She is alert and oriented to person, place, and time. She exhibits normal muscle tone. Coordination normal.   Skin: Skin is warm, dry, intact, not diaphoretic, not pale and no rash. Capillary refill takes less than 2 seconds. No erythema   Psychiatric: Her speech is normal and behavior is normal. Judgment and thought content normal.   Nursing note and vitals reviewed.  Ear Cerumen Removal    Date/Time: 12/15/2023 6:39 PM    Performed by: Tommy Hartmann PA  Authorized by: Tommy Hartmann PA    Time out: Immediately prior to procedure a "time out" was called to verify the correct patient, procedure, equipment, support staff and site/side marked as required.    Consent Done?:  Yes (Verbal)  Medication Used:  Other  Location details:  Right ear  Procedure type: irrigation    Cerumen  Removal Results:  Cerumen " completely removed  Patient tolerance:  Patient tolerated the procedure well with no immediate complications        Assessment:     1. Sore throat    2. Right ear pain    3. Impacted cerumen, unspecified laterality    4. Hearing loss due to cerumen impaction, right    5. Strep throat    6. Acute otitis externa of right ear, unspecified type        Plan:       Sore throat  -     POCT Strep A, Molecular  -     dexAMETHasone injection 10 mg    Right ear pain  -     Ear wax removal    Impacted cerumen, unspecified laterality  -     Ear Cerumen Removal    Hearing loss due to cerumen impaction, right  -     Ear wax removal    Strep throat  -     amoxicillin-clavulanate 875-125mg (AUGMENTIN) 875-125 mg per tablet; Take 1 tablet by mouth every 12 (twelve) hours.  Dispense: 20 tablet; Refill: 0    Acute otitis externa of right ear, unspecified type  -     ofloxacin (FLOXIN) 0.3 % otic solution; Place 5 drops into both ears 2 (two) times daily. for 7 days  Dispense: 5 mL; Refill: 0      Follow up if symptoms worsen or fail to improve, for F/U with PCP or ED.   Patient Instructions   Patient Education       Ear Infection ED   General Information   You came to the Emergency Department (ED) for an ear infection. An ear infection can cause ear pain and fever. You might also have trouble hearing from fluid buildup in the middle ear behind the eardrum.  Most ear infections are caused by viruses, but some are caused by bacteria. The doctor will wait to see if you get better on your own if they think the cause is a virus. The doctor will order antibiotic if they think the cause is a bacteria. Antibiotics kill bacteria, but they do not work on viruses.  If the doctor orders antibiotics, be sure to take all of them, even if you start to feel better.  What care is needed at home?   Call your regular doctor to let them know you were in the ED. Make a follow-up appointment if you were told to.  Do not put anything in your ear unless it  was ordered by the doctor.  You may want to take medicines like ibuprofen, naproxen, or acetaminophen to help with pain.  When do I need to call the doctor?   Your symptoms are not getting better in 2 to 3 days.  You continue to have problems hearing after 2 to 3 weeks.  You have a fever of 100.4°F (38°C) or higher or chills.  You have discharge or fluid coming from your ear.  You have new or worsening symptoms.  Last Reviewed Date   2020-12-16  Consumer Information Use and Disclaimer   This information is not specific medical advice and does not replace information you receive from your health care provider. This is only a brief summary of general information. It does NOT include all information about conditions, illnesses, injuries, tests, procedures, treatments, therapies, discharge instructions or life-style choices that may apply to you. You must talk with your health care provider for complete information about your health and treatment options. This information should not be used to decide whether or not to accept your health care providers advice, instructions or recommendations. Only your health care provider has the knowledge and training to provide advice that is right for you.  Copyright   Copyright © 2021 [employer] and its affiliates and/or licensors. All rights reserved.

## 2023-12-16 NOTE — PATIENT INSTRUCTIONS
Patient Education       Ear Infection ED   General Information   You came to the Emergency Department (ED) for an ear infection. An ear infection can cause ear pain and fever. You might also have trouble hearing from fluid buildup in the middle ear behind the eardrum.  Most ear infections are caused by viruses, but some are caused by bacteria. The doctor will wait to see if you get better on your own if they think the cause is a virus. The doctor will order antibiotic if they think the cause is a bacteria. Antibiotics kill bacteria, but they do not work on viruses.  If the doctor orders antibiotics, be sure to take all of them, even if you start to feel better.  What care is needed at home?   Call your regular doctor to let them know you were in the ED. Make a follow-up appointment if you were told to.  Do not put anything in your ear unless it was ordered by the doctor.  You may want to take medicines like ibuprofen, naproxen, or acetaminophen to help with pain.  When do I need to call the doctor?   Your symptoms are not getting better in 2 to 3 days.  You continue to have problems hearing after 2 to 3 weeks.  You have a fever of 100.4°F (38°C) or higher or chills.  You have discharge or fluid coming from your ear.  You have new or worsening symptoms.  Last Reviewed Date   2020-12-16  Consumer Information Use and Disclaimer   This information is not specific medical advice and does not replace information you receive from your health care provider. This is only a brief summary of general information. It does NOT include all information about conditions, illnesses, injuries, tests, procedures, treatments, therapies, discharge instructions or life-style choices that may apply to you. You must talk with your health care provider for complete information about your health and treatment options. This information should not be used to decide whether or not to accept your health care providers advice, instructions or  recommendations. Only your health care provider has the knowledge and training to provide advice that is right for you.  Copyright   Copyright © 2021 [employer] and its affiliates and/or licensors. All rights reserved.

## 2023-12-19 ENCOUNTER — ON-DEMAND VIRTUAL (OUTPATIENT)
Dept: URGENT CARE | Facility: CLINIC | Age: 53
End: 2023-12-19
Payer: COMMERCIAL

## 2023-12-19 DIAGNOSIS — R05.9 COUGH, UNSPECIFIED TYPE: ICD-10-CM

## 2023-12-19 DIAGNOSIS — J32.9 SINUSITIS, UNSPECIFIED CHRONICITY, UNSPECIFIED LOCATION: Primary | ICD-10-CM

## 2023-12-19 PROCEDURE — 99213 PR OFFICE/OUTPT VISIT, EST, LEVL III, 20-29 MIN: ICD-10-PCS | Mod: 95,,, | Performed by: PHYSICIAN ASSISTANT

## 2023-12-19 PROCEDURE — 99213 OFFICE O/P EST LOW 20 MIN: CPT | Mod: 95,,, | Performed by: PHYSICIAN ASSISTANT

## 2023-12-19 RX ORDER — PREDNISONE 20 MG/1
20 TABLET ORAL DAILY
Qty: 3 TABLET | Refills: 0 | Status: SHIPPED | OUTPATIENT
Start: 2023-12-19 | End: 2023-12-22

## 2023-12-19 RX ORDER — BENZONATATE 200 MG/1
200 CAPSULE ORAL 3 TIMES DAILY PRN
Qty: 12 CAPSULE | Refills: 0 | Status: SHIPPED | OUTPATIENT
Start: 2023-12-19 | End: 2024-01-19

## 2023-12-20 ENCOUNTER — OFFICE VISIT (OUTPATIENT)
Dept: INTERNAL MEDICINE | Facility: CLINIC | Age: 53
End: 2023-12-20
Payer: COMMERCIAL

## 2023-12-20 ENCOUNTER — TELEPHONE (OUTPATIENT)
Dept: INTERNAL MEDICINE | Facility: CLINIC | Age: 53
End: 2023-12-20
Payer: COMMERCIAL

## 2023-12-20 ENCOUNTER — TELEPHONE (OUTPATIENT)
Dept: PRIMARY CARE CLINIC | Facility: CLINIC | Age: 53
End: 2023-12-20
Payer: COMMERCIAL

## 2023-12-20 VITALS
DIASTOLIC BLOOD PRESSURE: 82 MMHG | WEIGHT: 170.63 LBS | SYSTOLIC BLOOD PRESSURE: 142 MMHG | OXYGEN SATURATION: 97 % | RESPIRATION RATE: 18 BRPM | HEART RATE: 100 BPM | HEIGHT: 69 IN | BODY MASS INDEX: 25.27 KG/M2 | TEMPERATURE: 97 F

## 2023-12-20 DIAGNOSIS — B96.89 ACUTE BACTERIAL BRONCHITIS: ICD-10-CM

## 2023-12-20 DIAGNOSIS — J02.0 STREP THROAT: Primary | ICD-10-CM

## 2023-12-20 DIAGNOSIS — J20.8 ACUTE BACTERIAL BRONCHITIS: ICD-10-CM

## 2023-12-20 DIAGNOSIS — R05.9 COUGH, UNSPECIFIED TYPE: ICD-10-CM

## 2023-12-20 LAB
CTP QC/QA: YES
CTP QC/QA: YES
FLUAV AG NPH QL: NEGATIVE
FLUBV AG NPH QL: NEGATIVE
SARS-COV-2 AG RESP QL IA.RAPID: NEGATIVE

## 2023-12-20 PROCEDURE — 1160F PR REVIEW ALL MEDS BY PRESCRIBER/CLIN PHARMACIST DOCUMENTED: ICD-10-PCS | Mod: CPTII,S$GLB,, | Performed by: INTERNAL MEDICINE

## 2023-12-20 PROCEDURE — 3077F SYST BP >= 140 MM HG: CPT | Mod: CPTII,S$GLB,, | Performed by: INTERNAL MEDICINE

## 2023-12-20 PROCEDURE — 87804 POCT INFLUENZA A/B: ICD-10-PCS | Mod: 59,QW,S$GLB, | Performed by: INTERNAL MEDICINE

## 2023-12-20 PROCEDURE — 99999 PR PBB SHADOW E&M-EST. PATIENT-LVL V: CPT | Mod: PBBFAC,,, | Performed by: INTERNAL MEDICINE

## 2023-12-20 PROCEDURE — 3079F PR MOST RECENT DIASTOLIC BLOOD PRESSURE 80-89 MM HG: ICD-10-PCS | Mod: CPTII,S$GLB,, | Performed by: INTERNAL MEDICINE

## 2023-12-20 PROCEDURE — 3008F BODY MASS INDEX DOCD: CPT | Mod: CPTII,S$GLB,, | Performed by: INTERNAL MEDICINE

## 2023-12-20 PROCEDURE — 1159F PR MEDICATION LIST DOCUMENTED IN MEDICAL RECORD: ICD-10-PCS | Mod: CPTII,S$GLB,, | Performed by: INTERNAL MEDICINE

## 2023-12-20 PROCEDURE — 3077F PR MOST RECENT SYSTOLIC BLOOD PRESSURE >= 140 MM HG: ICD-10-PCS | Mod: CPTII,S$GLB,, | Performed by: INTERNAL MEDICINE

## 2023-12-20 PROCEDURE — 3079F DIAST BP 80-89 MM HG: CPT | Mod: CPTII,S$GLB,, | Performed by: INTERNAL MEDICINE

## 2023-12-20 PROCEDURE — 1159F MED LIST DOCD IN RCRD: CPT | Mod: CPTII,S$GLB,, | Performed by: INTERNAL MEDICINE

## 2023-12-20 PROCEDURE — 99214 OFFICE O/P EST MOD 30 MIN: CPT | Mod: S$GLB,,, | Performed by: INTERNAL MEDICINE

## 2023-12-20 PROCEDURE — 1160F RVW MEDS BY RX/DR IN RCRD: CPT | Mod: CPTII,S$GLB,, | Performed by: INTERNAL MEDICINE

## 2023-12-20 PROCEDURE — 87811 SARS CORONAVIRUS 2 ANTIGEN POCT, MANUAL READ: ICD-10-PCS | Mod: QW,S$GLB,, | Performed by: INTERNAL MEDICINE

## 2023-12-20 PROCEDURE — 3008F PR BODY MASS INDEX (BMI) DOCUMENTED: ICD-10-PCS | Mod: CPTII,S$GLB,, | Performed by: INTERNAL MEDICINE

## 2023-12-20 PROCEDURE — 99999 PR PBB SHADOW E&M-EST. PATIENT-LVL V: ICD-10-PCS | Mod: PBBFAC,,, | Performed by: INTERNAL MEDICINE

## 2023-12-20 PROCEDURE — 99214 PR OFFICE/OUTPT VISIT, EST, LEVL IV, 30-39 MIN: ICD-10-PCS | Mod: S$GLB,,, | Performed by: INTERNAL MEDICINE

## 2023-12-20 PROCEDURE — 87804 INFLUENZA ASSAY W/OPTIC: CPT | Mod: 59,QW,S$GLB, | Performed by: INTERNAL MEDICINE

## 2023-12-20 PROCEDURE — 87811 SARS-COV-2 COVID19 W/OPTIC: CPT | Mod: QW,S$GLB,, | Performed by: INTERNAL MEDICINE

## 2023-12-20 RX ORDER — DOXYCYCLINE 100 MG/1
100 CAPSULE ORAL 2 TIMES DAILY
Qty: 14 CAPSULE | Refills: 0 | Status: SHIPPED | OUTPATIENT
Start: 2023-12-20 | End: 2023-12-27

## 2023-12-20 NOTE — PATIENT INSTRUCTIONS
As discussed, please follow up in person with primary care or local urgent care tomorrow morning for further evaluation of worsening symptoms.  Will send prescription now for cough suppressant and steroids to help reduce inflammation in sinuses and chest. ER precautions reviewed.     You must understand that you've received a Telehealth Urgent Care treatment only and that you may be released before all your medical problems are known or treated. You, the patient, will arrange for follow up care as instructed.

## 2023-12-20 NOTE — TELEPHONE ENCOUNTER
Spoke with patient. Results reviewed per Dr. Anne's Verbalized understanding. Will call with further concerns. RX for doxycycline called to [facility] per Dr Anne's verbal. Repeated back to Dr Anne accurately.   Called [facility] and on hold times 15 minutes. RX left on vm for doxycycline 100mg take 1 by mouth bid x 7 days. 14 tabs, no refills.

## 2023-12-20 NOTE — PROGRESS NOTES
Subjective:      Patient ID: Kristina Graham is a 53 y.o. female.    Vitals:  vitals were not taken for this visit.     Chief Complaint: Cough      Visit Type: TELE AUDIOVISUAL    Present with the patient at the time of consultation: TELEMED PRESENT WITH PATIENT: None    Past Medical History:   Diagnosis Date    Arthritis     Mixed hyperlipidemia 2022    Nontoxic multinodular goiter 2017    Rheumatoid arthritis(714.0)      Past Surgical History:   Procedure Laterality Date     SECTION, LOW TRANSVERSE      x2    REPAIR OF TENDON OF LOWER EXTREMITY Right 2023    Procedure: REPAIR, TENDON, LOWER EXTREMITY;  Surgeon: Jessee HENRIQUEZ II(Field) MD Eugenio;  Location: [facility];  Service: Orthopedics;  Laterality: Right;    THYROIDECTOMY       Review of patient's allergies indicates:   Allergen Reactions    Bactrim [sulfamethoxazole-trimethoprim] Hives     Current Outpatient Medications on File Prior to Visit   Medication Sig Dispense Refill    amoxicillin-clavulanate 875-125mg (AUGMENTIN) 875-125 mg per tablet Take 1 tablet by mouth every 12 (twelve) hours. 20 tablet 0    atorvastatin (LIPITOR) 10 MG tablet Take 10 mg by mouth once daily.      calcitRIOL (ROCALTROL) 0.5 MCG Cap Take 1 capsule by mouth 2 (two) times daily.      clindamycin-benzoyl peroxide gel Apply topically every morning.      cyanocobalamin 1,000 mcg/mL injection INJECT 1 ML (1,000 MCG TOTAL) INTO THE MUSCLE EVERY 30 DAYS. 3 mL 13    etodolac (LODINE XL) 400 MG 24 hr tablet TAKE 1 TABLET BY MOUTH ONCE DAILY 90 tablet 0    levothyroxine (TIROSINT) 88 mcg Cap Take 88 mcg by mouth Daily.      lifitegrast (XIIDRA) 5 % Dpet Place 1 drop into both eyes 2 (two) times daily. 60 each 11    magnesium oxide (MAG-OX) 400 mg tablet Take 400 mg by mouth once daily.      ofloxacin (FLOXIN) 0.3 % otic solution Place 5 drops into both ears 2 (two) times daily. for 7 days 5 mL 0    ondansetron (ZOFRAN-ODT) 4 MG TbDL Take 2 tablets (8 mg total) by  mouth every 6 (six) hours as needed (nausea). 20 tablet 0    oxyCODONE-acetaminophen (PERCOCET) 5-325 mg per tablet Take 1-2 tablets by mouth every 4 (four) hours as needed for Pain. 30 tablet 0    semaglutide (OZEMPIC) 1 mg/dose (4 mg/3 mL)       spironolactone (ALDACTONE) 100 MG tablet Take 1 tablet (100 mg total) by mouth once daily. 30 tablet 6     No current facility-administered medications on file prior to visit.     Family History   Problem Relation Age of Onset    Cancer Mother     Diabetes Mother     Hypertension Mother     Cataracts Mother     Glaucoma Mother     Cancer Father     Hypertension Father     Heart disease Father     Heart disease Paternal Grandfather     Hypertension Paternal Grandfather     Hypertension Sister     Cancer Maternal Aunt     Cancer Paternal Uncle     Hypertension Paternal Uncle     Cancer Maternal Grandmother     Diabetes Maternal Grandmother     Hypertension Maternal Grandmother     Glaucoma Maternal Grandmother     Heart disease Paternal Grandmother     Hypertension Paternal Grandmother     Glaucoma Maternal Grandfather     Amblyopia Neg Hx     Blindness Neg Hx     Macular degeneration Neg Hx     Retinal detachment Neg Hx     Strabismus Neg Hx        Medications Ordered                [id number] - [place] [address] AT [facility] [address] [address]    Telephone: [phone]   Fax: [phone]   Hours: Not open 24 hours                         E-Prescribed (2 of 2)              benzonatate (TESSALON) 200 MG capsule    Sig: Take 1 capsule (200 mg total) by mouth 3 (three) times daily as needed for Cough.       Start: 12/19/23     Quantity: 12 capsule Refills: 0                         predniSONE (DELTASONE) 20 MG tablet    Sig: Take 1 tablet (20 mg total) by mouth once daily. for 3 days       Start: 12/19/23     Quantity: 3 tablet Refills: 0                           Oh[facility] Peq Odvv Intake    12/19/2023   7:51 PM CST - Filed by Patient   Describe your reason for todays visit Severe congestion and cough   What is your current physical address in the event of a medical emergency? [address] [place] [address]   Are you able to take your vital signs? Yes   Systolic Blood Pressure: 108   Diastolic Blood Pressure: 84   Weight: 168   Height: 68.5   Pulse: 116   Temperature: 98   Respiration rate:    Pulse Oxygen:    Please attach any relevant images or files          HPI  52yo female presents for follow up. Was seen at the urgent care on 12/15 and diagnosed with strep and ear infection, has been taking augmentin since then. States ear and throat have improved but continue to have worsening nasal and chest congestion and cough. States chest hurts from cough and also teeth are hurting. Notes low appetite and heart rate elevated too. Using albuterol inhaler with some improvement. Denies fevers, wheezing. Hx of asthma.         Constitution: Positive for activity change and appetite change. Negative for fever.   HENT:  Positive for congestion. Negative for ear pain and sore throat.    Respiratory:  Positive for cough and asthma. Negative for chest tightness, shortness of breath and wheezing.    Gastrointestinal:  Negative for abdominal pain, nausea, vomiting and diarrhea.   Skin:  Negative for rash.   Allergic/Immunologic: Positive for asthma.        Objective:   The physical exam was conducted virtually.  Physical Exam   Constitutional: She is oriented to person, place, and time.  Non-toxic appearance. She does not appear ill. No distress.   HENT:   Head: Normocephalic and atraumatic.   Nose: Right sinus exhibits maxillary sinus tenderness and frontal sinus tenderness. Left sinus exhibits maxillary sinus tenderness and frontal sinus tenderness.   Neck: Neck supple.   Pulmonary/Chest: Effort normal. No respiratory distress.   Abdominal: Normal appearance.   Lymphadenopathy:     She has cervical adenopathy.   Neurological:  She is alert and oriented to person, place, and time. Coordination normal.   Skin: Skin is dry, not diaphoretic, not pale and no rash.   Psychiatric: Her behavior is normal. Judgment and thought content normal.       Assessment:     1. Sinusitis, unspecified chronicity, unspecified location    2. Cough, unspecified type        Plan:       Sinusitis, unspecified chronicity, unspecified location    Cough, unspecified type    Other orders  -     benzonatate (TESSALON) 200 MG capsule; Take 1 capsule (200 mg total) by mouth 3 (three) times daily as needed for Cough.  Dispense: 12 capsule; Refill: 0  -     predniSONE (DELTASONE) 20 MG tablet; Take 1 tablet (20 mg total) by mouth once daily. for 3 days  Dispense: 3 tablet; Refill: 0      As discussed, please follow up in person with primary care or local urgent care tomorrow morning for further evaluation; concerning for bacterial bronchitis vs pneumonia in addition with sinus infection.  Will send prescription for cough suppressant and steroids to help reduce inflammation in sinuses and chest. ER precautions reviewed. Patient verbalized understanding and agreement with plan.

## 2023-12-20 NOTE — PROGRESS NOTES
Subjective:       Patient ID: Kristina Graham is a 53 y.o. female.    Chief Complaint: Cough and Nasal Congestion (Positive for strep Friday 12/15/2023)    HPI    53-year-old female here for evaluation of coughing and congestion.  She spent 3.5 hours in  for strep on Friday.  She is still really sick.  She started to get sick 6 days ago with a sore throat.  She woke up worse Friday morning.  She was only swabbed for strep and came back positive for strep.  Her throat is better.  She got a steroid and antibiotic drop.  She is on Augmentin twice daily.  Her face and teeth hurt.  She was on the trip with a 6 and 10 yo ([place]).  She has a cough that is productive of clear mucus.  It was yellow.  The nose and cough are worse.  She did not have the congestion or cough Friday.    Review of Systems      Objective:      Physical Exam  Vitals reviewed.   Constitutional:       Appearance: She is well-developed.   HENT:      Head: Normocephalic and atraumatic.      Mouth/Throat:      Pharynx: No oropharyngeal exudate.   Eyes:      General: No scleral icterus.        Right eye: No discharge.         Left eye: No discharge.      Pupils: Pupils are equal, round, and reactive to light.   Neck:      Thyroid: No thyromegaly.      Trachea: No tracheal deviation.   Cardiovascular:      Rate and Rhythm: Normal rate and regular rhythm.      Heart sounds: Normal heart sounds. No murmur heard.     No friction rub. No gallop.   Pulmonary:      Effort: Pulmonary effort is normal. No respiratory distress.      Breath sounds: Normal breath sounds. No wheezing or rales.   Chest:      Chest wall: No tenderness.   Abdominal:      General: Bowel sounds are normal. There is no distension.      Palpations: Abdomen is soft. There is no mass.      Tenderness: There is no abdominal tenderness. There is no guarding or rebound.   Musculoskeletal:         General: No tenderness. Normal range of motion.      Cervical back: Normal range of motion  and neck supple.   Skin:     General: Skin is warm and dry.      Coloration: Skin is not pale.      Findings: No erythema or rash.   Neurological:      Mental Status: She is alert and oriented to person, place, and time.   Psychiatric:         Behavior: Behavior normal.         Assessment:       1. Strep throat    2. Cough, unspecified type  - POCT Influenza A/B Rapid Antigen  - SARS Coronavirus 2 Antigen, POCT Manual Read    3. Acute bacterial bronchitis      Plan:       1/2/3.  Negative for both flu and COVID.  Doxycycline 100 mg twice daily for 7 days sent to pharmacy.  Patient to continue Augmentin as well.  Think patient has 2 different processes going on including strep throat and bacterial bronchitis.

## 2024-01-19 ENCOUNTER — OFFICE VISIT (OUTPATIENT)
Dept: PRIMARY CARE CLINIC | Facility: CLINIC | Age: 54
End: 2024-01-19
Payer: COMMERCIAL

## 2024-01-19 VITALS
HEIGHT: 69 IN | BODY MASS INDEX: 25.83 KG/M2 | HEART RATE: 105 BPM | OXYGEN SATURATION: 97 % | DIASTOLIC BLOOD PRESSURE: 84 MMHG | RESPIRATION RATE: 18 BRPM | TEMPERATURE: 97 F | WEIGHT: 174.38 LBS | SYSTOLIC BLOOD PRESSURE: 130 MMHG

## 2024-01-19 DIAGNOSIS — Z13.6 ENCOUNTER FOR SCREENING FOR CARDIOVASCULAR DISORDERS: ICD-10-CM

## 2024-01-19 DIAGNOSIS — Z00.00 ANNUAL PHYSICAL EXAM: Primary | ICD-10-CM

## 2024-01-19 PROCEDURE — 3075F SYST BP GE 130 - 139MM HG: CPT | Mod: CPTII,S$GLB,, | Performed by: INTERNAL MEDICINE

## 2024-01-19 PROCEDURE — 3008F BODY MASS INDEX DOCD: CPT | Mod: CPTII,S$GLB,, | Performed by: INTERNAL MEDICINE

## 2024-01-19 PROCEDURE — 3079F DIAST BP 80-89 MM HG: CPT | Mod: CPTII,S$GLB,, | Performed by: INTERNAL MEDICINE

## 2024-01-19 PROCEDURE — 1160F RVW MEDS BY RX/DR IN RCRD: CPT | Mod: CPTII,S$GLB,, | Performed by: INTERNAL MEDICINE

## 2024-01-19 PROCEDURE — 1159F MED LIST DOCD IN RCRD: CPT | Mod: CPTII,S$GLB,, | Performed by: INTERNAL MEDICINE

## 2024-01-19 PROCEDURE — 99999 PR PBB SHADOW E&M-EST. PATIENT-LVL IV: CPT | Mod: PBBFAC,,, | Performed by: INTERNAL MEDICINE

## 2024-01-19 PROCEDURE — 99396 PREV VISIT EST AGE 40-64: CPT | Mod: S$GLB,,, | Performed by: INTERNAL MEDICINE

## 2024-01-22 ENCOUNTER — HOSPITAL ENCOUNTER (OUTPATIENT)
Dept: RADIOLOGY | Facility: HOSPITAL | Age: 54
Discharge: HOME | End: 2024-01-22
Attending: INTERNAL MEDICINE
Payer: COMMERCIAL

## 2024-01-22 DIAGNOSIS — Z13.6 ENCOUNTER FOR SCREENING FOR CARDIOVASCULAR DISORDERS: ICD-10-CM

## 2024-01-22 PROCEDURE — 75571 CT HRT W/O DYE W/CA TEST: CPT | Mod: 26,,, | Performed by: RADIOLOGY

## 2024-01-22 PROCEDURE — 75571 CT HRT W/O DYE W/CA TEST: CPT | Mod: TC

## 2024-01-29 ENCOUNTER — PATIENT MESSAGE (OUTPATIENT)
Dept: RHEUMATOLOGY | Facility: CLINIC | Age: 54
End: 2024-01-29
Payer: COMMERCIAL

## 2024-01-29 DIAGNOSIS — M79.642 BILATERAL HAND PAIN: ICD-10-CM

## 2024-01-29 DIAGNOSIS — R76.8 RHEUMATOID FACTOR POSITIVE: Primary | ICD-10-CM

## 2024-01-29 DIAGNOSIS — M79.641 BILATERAL HAND PAIN: ICD-10-CM

## 2024-02-01 ENCOUNTER — PATIENT MESSAGE (OUTPATIENT)
Dept: RHEUMATOLOGY | Facility: CLINIC | Age: 54
End: 2024-02-01
Payer: COMMERCIAL

## 2024-02-01 DIAGNOSIS — M79.644 BILATERAL THUMB PAIN: ICD-10-CM

## 2024-02-01 DIAGNOSIS — M25.571 CHRONIC PAIN OF BOTH ANKLES: ICD-10-CM

## 2024-02-01 DIAGNOSIS — M79.645 BILATERAL THUMB PAIN: ICD-10-CM

## 2024-02-01 DIAGNOSIS — G89.29 CHRONIC PAIN OF BOTH ANKLES: ICD-10-CM

## 2024-02-01 DIAGNOSIS — M25.572 CHRONIC PAIN OF BOTH ANKLES: ICD-10-CM

## 2024-02-01 LAB
CRP SERPL-MCNC: <1 MG/L (ref 0–10)
ERYTHROCYTE [SEDIMENTATION RATE] IN BLOOD BY WESTERGREN METHOD: 6 MM/HR (ref 0–40)

## 2024-02-01 RX ORDER — ETODOLAC 400 MG/1
400 TABLET, EXTENDED RELEASE ORAL DAILY
Qty: 90 TABLET | Refills: 0 | Status: SHIPPED | OUTPATIENT
Start: 2024-02-01 | End: 2024-05-03

## 2024-02-07 ENCOUNTER — PATIENT MESSAGE (OUTPATIENT)
Dept: PRIMARY CARE CLINIC | Facility: CLINIC | Age: 54
End: 2024-02-07
Payer: COMMERCIAL

## 2024-02-14 ENCOUNTER — PATIENT MESSAGE (OUTPATIENT)
Dept: PRIMARY CARE CLINIC | Facility: CLINIC | Age: 54
End: 2024-02-14
Payer: COMMERCIAL

## 2024-02-14 DIAGNOSIS — Z20.828 EXPOSURE TO INFLUENZA: Primary | ICD-10-CM

## 2024-02-15 RX ORDER — BALOXAVIR MARBOXIL 40 MG/1
40 TABLET, FILM COATED ORAL ONCE
Qty: 1 TABLET | Refills: 0 | Status: SHIPPED | OUTPATIENT
Start: 2024-02-15 | End: 2024-02-15

## 2024-04-22 PROBLEM — Z00.00 ANNUAL PHYSICAL EXAM: Status: RESOLVED | Noted: 2024-01-19 | Resolved: 2024-04-22

## 2024-05-03 DIAGNOSIS — M25.571 CHRONIC PAIN OF BOTH ANKLES: ICD-10-CM

## 2024-05-03 DIAGNOSIS — G89.29 CHRONIC PAIN OF BOTH ANKLES: ICD-10-CM

## 2024-05-03 DIAGNOSIS — M79.644 BILATERAL THUMB PAIN: ICD-10-CM

## 2024-05-03 DIAGNOSIS — M25.572 CHRONIC PAIN OF BOTH ANKLES: ICD-10-CM

## 2024-05-03 DIAGNOSIS — M79.645 BILATERAL THUMB PAIN: ICD-10-CM

## 2024-05-03 RX ORDER — ETODOLAC 400 MG/1
400 TABLET, EXTENDED RELEASE ORAL
Qty: 90 TABLET | Refills: 0 | Status: SHIPPED | OUTPATIENT
Start: 2024-05-03

## 2024-06-24 ENCOUNTER — PATIENT MESSAGE (OUTPATIENT)
Dept: PRIMARY CARE CLINIC | Facility: CLINIC | Age: 54
End: 2024-06-24
Payer: COMMERCIAL

## 2024-06-24 DIAGNOSIS — Z87.898 H/O MOTION SICKNESS: Primary | ICD-10-CM

## 2024-06-24 RX ORDER — SCOLOPAMINE TRANSDERMAL SYSTEM 1 MG/1
1 PATCH, EXTENDED RELEASE TRANSDERMAL
Qty: 3 PATCH | Refills: 0 | Status: SHIPPED | OUTPATIENT
Start: 2024-06-24

## 2024-06-24 NOTE — TELEPHONE ENCOUNTER
Care Due:                  Date            Visit Type   Department     Provider  --------------------------------------------------------------------------------                                EP -                              PRIMARY      [facility]  Last Visit: 01-      CARE ([facility])   SULLY Ahmadi  Next Visit: None Scheduled  None         None Found                                                            Last  Test          Frequency    Reason                     Performed    Due Date  --------------------------------------------------------------------------------    CMP.........  12 months..  spironolactone...........  08- 07-    [facility] Embedded Care Due Messages. Reference number: [id number].   6/24/2024 12:55:37 PM CDT

## 2024-06-24 NOTE — TELEPHONE ENCOUNTER
Pt requesting patch for motion sickness for cruise, pended for your review.     LOV with Jacob Ahmadi MD , 1/19/2024

## 2024-07-31 ENCOUNTER — PATIENT MESSAGE (OUTPATIENT)
Dept: PRIMARY CARE CLINIC | Facility: CLINIC | Age: 54
End: 2024-07-31
Payer: COMMERCIAL

## 2024-07-31 ENCOUNTER — PATIENT MESSAGE (OUTPATIENT)
Dept: RHEUMATOLOGY | Facility: CLINIC | Age: 54
End: 2024-07-31
Payer: COMMERCIAL

## 2024-07-31 DIAGNOSIS — M79.644 BILATERAL THUMB PAIN: ICD-10-CM

## 2024-07-31 DIAGNOSIS — M79.642 BILATERAL HAND PAIN: ICD-10-CM

## 2024-07-31 DIAGNOSIS — M25.571 PAIN AND SWELLING OF RIGHT ANKLE: ICD-10-CM

## 2024-07-31 DIAGNOSIS — G89.29 CHRONIC PAIN OF BOTH ANKLES: ICD-10-CM

## 2024-07-31 DIAGNOSIS — M25.649 STIFFNESS OF HAND JOINT, UNSPECIFIED LATERALITY: ICD-10-CM

## 2024-07-31 DIAGNOSIS — M25.571 CHRONIC PAIN OF RIGHT ANKLE: ICD-10-CM

## 2024-07-31 DIAGNOSIS — M25.572 CHRONIC PAIN OF BOTH ANKLES: ICD-10-CM

## 2024-07-31 DIAGNOSIS — J20.9 ACUTE BRONCHITIS, UNSPECIFIED ORGANISM: Primary | ICD-10-CM

## 2024-07-31 DIAGNOSIS — M79.645 BILATERAL THUMB PAIN: ICD-10-CM

## 2024-07-31 DIAGNOSIS — M25.571 CHRONIC PAIN OF BOTH ANKLES: ICD-10-CM

## 2024-07-31 DIAGNOSIS — G89.29 CHRONIC PAIN OF RIGHT ANKLE: ICD-10-CM

## 2024-07-31 DIAGNOSIS — M79.641 BILATERAL HAND PAIN: ICD-10-CM

## 2024-07-31 DIAGNOSIS — R76.8 RHEUMATOID FACTOR POSITIVE: Primary | ICD-10-CM

## 2024-07-31 DIAGNOSIS — R53.83 FATIGUE, UNSPECIFIED TYPE: ICD-10-CM

## 2024-07-31 DIAGNOSIS — M25.471 PAIN AND SWELLING OF RIGHT ANKLE: ICD-10-CM

## 2024-08-01 RX ORDER — AZITHROMYCIN 250 MG/1
TABLET, FILM COATED ORAL
Qty: 6 TABLET | Refills: 0 | Status: SHIPPED | OUTPATIENT
Start: 2024-08-01 | End: 2024-08-05

## 2024-08-01 RX ORDER — ETODOLAC 400 MG/1
400 TABLET, EXTENDED RELEASE ORAL
Qty: 90 TABLET | Refills: 0 | Status: SHIPPED | OUTPATIENT
Start: 2024-08-01

## 2024-08-03 LAB
ALBUMIN SERPL-MCNC: 4.4 G/DL (ref 3.6–5.1)
ALBUMIN/GLOB SERPL: 1.5 (CALC) (ref 1–2.5)
ALP SERPL-CCNC: 77 U/L (ref 37–153)
ALT SERPL-CCNC: 15 U/L (ref 6–29)
AST SERPL-CCNC: 14 U/L (ref 10–35)
BASOPHILS # BLD AUTO: 48 CELLS/UL (ref 0–200)
BASOPHILS NFR BLD AUTO: 0.7 %
BILIRUB SERPL-MCNC: 0.7 MG/DL (ref 0.2–1.2)
BUN SERPL-MCNC: 15 MG/DL (ref 7–25)
BUN/CREAT SERPL: NORMAL (CALC) (ref 6–22)
CALCIUM SERPL-MCNC: 9.6 MG/DL (ref 8.6–10.4)
CHLORIDE SERPL-SCNC: 104 MMOL/L (ref 98–110)
CO2 SERPL-SCNC: 28 MMOL/L (ref 20–32)
CREAT SERPL-MCNC: 0.88 MG/DL (ref 0.5–1.03)
CRP SERPL-MCNC: <3 MG/L
EGFR: 79 ML/MIN/1.73M2
EOSINOPHIL # BLD AUTO: 88 CELLS/UL (ref 15–500)
EOSINOPHIL NFR BLD AUTO: 1.3 %
ERYTHROCYTE [DISTWIDTH] IN BLOOD BY AUTOMATED COUNT: 12.4 % (ref 11–15)
ERYTHROCYTE [SEDIMENTATION RATE] IN BLOOD BY WESTERGREN METHOD: 2 MM/H
GLOBULIN SER CALC-MCNC: 2.9 G/DL (CALC) (ref 1.9–3.7)
GLUCOSE SERPL-MCNC: 81 MG/DL (ref 65–99)
HCT VFR BLD AUTO: 44.8 % (ref 35–45)
HGB BLD-MCNC: 14.4 G/DL (ref 11.7–15.5)
LYMPHOCYTES # BLD AUTO: 1863 CELLS/UL (ref 850–3900)
LYMPHOCYTES NFR BLD AUTO: 27.4 %
MCH RBC QN AUTO: 30.5 PG (ref 27–33)
MCHC RBC AUTO-ENTMCNC: 32.1 G/DL (ref 32–36)
MCV RBC AUTO: 94.9 FL (ref 80–100)
MONOCYTES # BLD AUTO: 476 CELLS/UL (ref 200–950)
MONOCYTES NFR BLD AUTO: 7 %
NEUTROPHILS # BLD AUTO: 4325 CELLS/UL (ref 1500–7800)
NEUTROPHILS NFR BLD AUTO: 63.6 %
PLATELET # BLD AUTO: 257 THOUSAND/UL (ref 140–400)
PMV BLD REES-ECKER: 9.4 FL (ref 7.5–12.5)
POTASSIUM SERPL-SCNC: 4.3 MMOL/L (ref 3.5–5.3)
PROT SERPL-MCNC: 7.3 G/DL (ref 6.1–8.1)
RBC # BLD AUTO: 4.72 MILLION/UL (ref 3.8–5.1)
SODIUM SERPL-SCNC: 141 MMOL/L (ref 135–146)
WBC # BLD AUTO: 6.8 THOUSAND/UL (ref 3.8–10.8)

## 2024-08-05 ENCOUNTER — OFFICE VISIT (OUTPATIENT)
Dept: RHEUMATOLOGY | Facility: CLINIC | Age: 54
End: 2024-08-05
Payer: COMMERCIAL

## 2024-08-05 VITALS
WEIGHT: 171.94 LBS | DIASTOLIC BLOOD PRESSURE: 71 MMHG | HEIGHT: 69 IN | HEART RATE: 77 BPM | SYSTOLIC BLOOD PRESSURE: 108 MMHG | BODY MASS INDEX: 25.47 KG/M2

## 2024-08-05 DIAGNOSIS — R76.8 RHEUMATOID FACTOR POSITIVE: Primary | ICD-10-CM

## 2024-08-05 DIAGNOSIS — M79.642 BILATERAL HAND PAIN: ICD-10-CM

## 2024-08-05 DIAGNOSIS — M25.649 STIFFNESS OF HAND JOINT, UNSPECIFIED LATERALITY: ICD-10-CM

## 2024-08-05 DIAGNOSIS — M79.641 BILATERAL HAND PAIN: ICD-10-CM

## 2024-08-05 PROCEDURE — 3078F DIAST BP <80 MM HG: CPT | Mod: CPTII,S$GLB,, | Performed by: INTERNAL MEDICINE

## 2024-08-05 PROCEDURE — 3074F SYST BP LT 130 MM HG: CPT | Mod: CPTII,S$GLB,, | Performed by: INTERNAL MEDICINE

## 2024-08-05 PROCEDURE — 1160F RVW MEDS BY RX/DR IN RCRD: CPT | Mod: CPTII,S$GLB,, | Performed by: INTERNAL MEDICINE

## 2024-08-05 PROCEDURE — 3008F BODY MASS INDEX DOCD: CPT | Mod: CPTII,S$GLB,, | Performed by: INTERNAL MEDICINE

## 2024-08-05 PROCEDURE — 1159F MED LIST DOCD IN RCRD: CPT | Mod: CPTII,S$GLB,, | Performed by: INTERNAL MEDICINE

## 2024-08-05 PROCEDURE — 99999 PR PBB SHADOW E&M-EST. PATIENT-LVL III: CPT | Mod: PBBFAC,,, | Performed by: INTERNAL MEDICINE

## 2024-08-05 PROCEDURE — 99214 OFFICE O/P EST MOD 30 MIN: CPT | Mod: S$GLB,,, | Performed by: INTERNAL MEDICINE

## 2024-08-05 RX ORDER — PREDNISONE 10 MG/1
TABLET ORAL
Qty: 30 TABLET | Refills: 0 | Status: SHIPPED | OUTPATIENT
Start: 2024-08-05

## 2024-09-04 DIAGNOSIS — Z12.31 OTHER SCREENING MAMMOGRAM: ICD-10-CM

## 2024-09-19 ENCOUNTER — OFFICE VISIT (OUTPATIENT)
Dept: INTERNAL MEDICINE | Facility: CLINIC | Age: 54
End: 2024-09-19
Payer: COMMERCIAL

## 2024-09-19 ENCOUNTER — LAB VISIT (OUTPATIENT)
Dept: LAB | Facility: HOSPITAL | Age: 54
End: 2024-09-19
Attending: INTERNAL MEDICINE
Payer: COMMERCIAL

## 2024-09-19 VITALS
RESPIRATION RATE: 16 BRPM | HEART RATE: 64 BPM | OXYGEN SATURATION: 98 % | DIASTOLIC BLOOD PRESSURE: 70 MMHG | WEIGHT: 172.38 LBS | BODY MASS INDEX: 25.53 KG/M2 | SYSTOLIC BLOOD PRESSURE: 116 MMHG | HEIGHT: 69 IN | TEMPERATURE: 98 F

## 2024-09-19 DIAGNOSIS — Z78.0 MENOPAUSE: ICD-10-CM

## 2024-09-19 DIAGNOSIS — N30.00 ACUTE CYSTITIS WITHOUT HEMATURIA: Primary | ICD-10-CM

## 2024-09-19 DIAGNOSIS — E78.2 MIXED HYPERLIPIDEMIA: ICD-10-CM

## 2024-09-19 DIAGNOSIS — N30.00 ACUTE CYSTITIS WITHOUT HEMATURIA: ICD-10-CM

## 2024-09-19 PROCEDURE — 3074F SYST BP LT 130 MM HG: CPT | Mod: CPTII,S$GLB,, | Performed by: INTERNAL MEDICINE

## 2024-09-19 PROCEDURE — 99999 PR PBB SHADOW E&M-EST. PATIENT-LVL IV: CPT | Mod: PBBFAC,,, | Performed by: INTERNAL MEDICINE

## 2024-09-19 PROCEDURE — 87086 URINE CULTURE/COLONY COUNT: CPT | Performed by: INTERNAL MEDICINE

## 2024-09-19 PROCEDURE — 1159F MED LIST DOCD IN RCRD: CPT | Mod: CPTII,S$GLB,, | Performed by: INTERNAL MEDICINE

## 2024-09-19 PROCEDURE — 1160F RVW MEDS BY RX/DR IN RCRD: CPT | Mod: CPTII,S$GLB,, | Performed by: INTERNAL MEDICINE

## 2024-09-19 PROCEDURE — 81001 URINALYSIS AUTO W/SCOPE: CPT | Performed by: INTERNAL MEDICINE

## 2024-09-19 PROCEDURE — 3008F BODY MASS INDEX DOCD: CPT | Mod: CPTII,S$GLB,, | Performed by: INTERNAL MEDICINE

## 2024-09-19 PROCEDURE — 3078F DIAST BP <80 MM HG: CPT | Mod: CPTII,S$GLB,, | Performed by: INTERNAL MEDICINE

## 2024-09-19 PROCEDURE — 99214 OFFICE O/P EST MOD 30 MIN: CPT | Mod: S$GLB,,, | Performed by: INTERNAL MEDICINE

## 2024-09-19 RX ORDER — ETHYNODIOL DIACETATE AND ETHINYL ESTRADIOL 1 MG-35MCG
1 KIT ORAL DAILY
Qty: 90 TABLET | Refills: 3 | Status: SHIPPED | OUTPATIENT
Start: 2024-09-19 | End: 2025-09-19

## 2024-09-19 RX ORDER — CIPROFLOXACIN 500 MG/1
500 TABLET ORAL EVERY 12 HOURS
Qty: 6 TABLET | Refills: 0 | Status: SHIPPED | OUTPATIENT
Start: 2024-09-19 | End: 2024-09-22

## 2024-09-19 RX ORDER — LEVOTHYROXINE SODIUM 75 UG/1
75 TABLET ORAL
COMMUNITY

## 2024-09-19 NOTE — PROGRESS NOTES
"Subjective:       Patient ID: Kristina Graham is a 53 y.o. female.    Chief Complaint: Urinary Tract Infection    HPI    53-year-old female here for evaluation of a UTI.    History of Present Illness    CHIEF COMPLAINT:  Ms. Graham presents today for evaluation of a urinary tract infection.    URINARY TRACT INFECTION:  She reports experiencing UTI symptoms for almost two weeks, including pain, hematuria, and dysuria. She also complains of back pain, urgency, and slight dysuria. She completed a course of Macrobid prescribed at an urgent care facility. While the dysuria has improved, she continues to experience some back pain and urgency. The back pain is localized to the lower back region, not in the kidney area. She denies fever or chills.    MENOPAUSE SYMPTOMS:  She reports menopausal symptoms including hot flashes causing significant discomfort and disruption to daily life. She experiences night sweats interfering with sleep, leading to sleep disturbances. She expresses feeling "a little bit miserable" due to these symptoms and notes increased irritability, particularly due to lack of sleep. She denies fevers or chills related to these symptoms.    FAMILY HISTORY:  Her sister experienced severe menopausal symptoms, including significant sweating.    MEDICATIONS AND ALLERGIES:  She reports an allergy to Bactrim, which causes itching when taken.    SOCIAL HISTORY:  She works as a , specifically selling [employer]. She recently returned from a trip to [place] last Friday. She initially attributed her urinary pain to increased walking while in [place].      ROS:  Constitutional: -fevers, -chills, +night sweats  Genitourinary: +urgency  Musculoskeletal: +back pain  Integumentary: +itching         Review of Systems          Objective:      Physical Exam  Vitals reviewed.   Constitutional:       Appearance: She is well-developed.   HENT:      Head: Normocephalic and atraumatic.      " Mouth/Throat:      Pharynx: No oropharyngeal exudate.   Eyes:      General: No scleral icterus.        Right eye: No discharge.         Left eye: No discharge.      Pupils: Pupils are equal, round, and reactive to light.   Neck:      Thyroid: No thyromegaly.      Trachea: No tracheal deviation.   Cardiovascular:      Rate and Rhythm: Normal rate and regular rhythm.      Heart sounds: Normal heart sounds. No murmur heard.     No friction rub. No gallop.   Pulmonary:      Effort: Pulmonary effort is normal. No respiratory distress.      Breath sounds: Normal breath sounds. No wheezing or rales.   Chest:      Chest wall: No tenderness.   Abdominal:      General: Bowel sounds are normal. There is no distension.      Palpations: Abdomen is soft. There is no mass.      Tenderness: There is no abdominal tenderness. There is no guarding or rebound.   Musculoskeletal:         General: No tenderness. Normal range of motion.      Cervical back: Normal range of motion and neck supple.   Skin:     General: Skin is warm and dry.      Coloration: Skin is not pale.      Findings: No erythema or rash.   Neurological:      Mental Status: She is alert and oriented to person, place, and time.   Psychiatric:         Behavior: Behavior normal.         Assessment:       1. Acute cystitis without hematuria  - Urine culture; Future  - Urinalysis; Future    2. Menopause    3. Mixed hyperlipidemia  - Lipid Panel; Future  - TSH; Future  - Comprehensive Metabolic Panel; Future  - CBC Auto Differential; Future  - Lipid Panel  - TSH  - Comprehensive Metabolic Panel  - CBC Auto Differential      Plan:         Assessment & Plan    URINARY TRACT INFECTION (UTI):   Assessed persistent UTI symptoms despite recent Macrobid course.   Considered potential antibiotic resistance to Macrobid.   Opted for Cipro as second-line treatment for UTI.   Started Ciprofloxacin twice daily for 3 days for UTI.   Ordered urine specimen.   Follow up to provide urine  specimen on the 5th floor.    MENOPAUSAL SYMPTOMS:   Evaluated menopausal symptoms and discussed hormone replacement therapy options.   Decided on estradiol for management of vasomotor symptoms.   Explained that menopausal symptoms typically last a few years.   Discussed risks associated with hormone replacement therapy after age 60, including increased risk of blood clots, cancer, and cardiovascular events.   Informed patient that vasomotor symptoms may return after discontinuing hormone replacement therapy.   Started estradiol for management of menopausal symptoms.    SLEEP ISSUES:   Ms. Graham to take melatonin 5 to 10 mg between 1 and 4 hours before bedtime.   Ms. Graham to adjust melatonin timing based on drowsiness onset.    LABS:   Ordered complete blood count.   Ordered electrolytes.   Ordered kidney function tests.   Ordered liver function tests.   Ordered glucose.   Ordered thyroid function tests.   Ordered cholesterol panel.    MAMMOGRAM:   Ordered mammogram.   Follow up to schedule mammogram.

## 2024-09-20 LAB
BACTERIA #/AREA URNS AUTO: ABNORMAL /HPF
BACTERIA UR CULT: NORMAL
BACTERIA UR CULT: NORMAL
BILIRUB UR QL STRIP: NEGATIVE
CLARITY UR REFRACT.AUTO: CLEAR
COLOR UR AUTO: YELLOW
GLUCOSE UR QL STRIP: NEGATIVE
HGB UR QL STRIP: NEGATIVE
KETONES UR QL STRIP: NEGATIVE
LEUKOCYTE ESTERASE UR QL STRIP: ABNORMAL
MICROSCOPIC COMMENT: ABNORMAL
NITRITE UR QL STRIP: NEGATIVE
NON-SQ EPI CELLS #/AREA URNS AUTO: 1 /HPF
PH UR STRIP: 6 [PH] (ref 5–8)
PROT UR QL STRIP: NEGATIVE
RBC #/AREA URNS AUTO: 2 /HPF (ref 0–4)
SP GR UR STRIP: 1.01 (ref 1–1.03)
SQUAMOUS #/AREA URNS AUTO: 12 /HPF
URN SPEC COLLECT METH UR: ABNORMAL
WBC #/AREA URNS AUTO: 30 /HPF (ref 0–5)

## 2024-09-27 ENCOUNTER — OFFICE VISIT (OUTPATIENT)
Dept: PRIMARY CARE CLINIC | Facility: CLINIC | Age: 54
End: 2024-09-27
Payer: COMMERCIAL

## 2024-09-27 ENCOUNTER — LAB VISIT (OUTPATIENT)
Dept: LAB | Facility: HOSPITAL | Age: 54
End: 2024-09-27
Attending: STUDENT IN AN ORGANIZED HEALTH CARE EDUCATION/TRAINING PROGRAM
Payer: COMMERCIAL

## 2024-09-27 VITALS
SYSTOLIC BLOOD PRESSURE: 116 MMHG | WEIGHT: 175.94 LBS | BODY MASS INDEX: 25.98 KG/M2 | DIASTOLIC BLOOD PRESSURE: 68 MMHG

## 2024-09-27 DIAGNOSIS — R30.0 DYSURIA: Primary | ICD-10-CM

## 2024-09-27 DIAGNOSIS — R30.0 DYSURIA: ICD-10-CM

## 2024-09-27 LAB
BACTERIA #/AREA URNS AUTO: ABNORMAL /HPF
MICROSCOPIC COMMENT: ABNORMAL
NON-SQ EPI CELLS #/AREA URNS AUTO: 1 /HPF
RBC #/AREA URNS AUTO: 1 /HPF (ref 0–4)
SQUAMOUS #/AREA URNS AUTO: 3 /HPF
WBC #/AREA URNS AUTO: 3 /HPF (ref 0–5)

## 2024-09-27 PROCEDURE — 81001 URINALYSIS AUTO W/SCOPE: CPT | Performed by: STUDENT IN AN ORGANIZED HEALTH CARE EDUCATION/TRAINING PROGRAM

## 2024-09-27 PROCEDURE — 87088 URINE BACTERIA CULTURE: CPT | Performed by: STUDENT IN AN ORGANIZED HEALTH CARE EDUCATION/TRAINING PROGRAM

## 2024-09-27 PROCEDURE — 87086 URINE CULTURE/COLONY COUNT: CPT | Performed by: STUDENT IN AN ORGANIZED HEALTH CARE EDUCATION/TRAINING PROGRAM

## 2024-09-27 PROCEDURE — 99999 PR PBB SHADOW E&M-EST. PATIENT-LVL III: CPT | Mod: PBBFAC,,, | Performed by: STUDENT IN AN ORGANIZED HEALTH CARE EDUCATION/TRAINING PROGRAM

## 2024-09-27 NOTE — PROGRESS NOTES
INTERNAL MEDICINE SAME DAY PRIMARY CARE VISIT NOTE    Subjective:     Chief Complaint: Recurrent UTIs       Patient ID: Kristina Graham is a 53 y.o. female, here today for focused same-day primary care visit.    PCP: Dr. Ahmadi    Presents today to discuss dysuria.    Symptoms initially started about 2 weeks ago when she was on vacation with dysuria.  When she returned, she was prescribed Macrobid which she took for 7 days.  She did not notice improvement in symptoms so she was seen by another clinic who started her on Cipro 500 mg b.i.d. for 3 days on September 19th due to continued dysuria.  Urine culture at that time was normal.  Continues to endorse dysuria.  She denies fevers..  No pelvic pain.    Past Medical History:  Past Medical History:   Diagnosis Date    Annual physical exam 1/19/2024    Arthritis     Mixed hyperlipidemia 08/19/2022    Nontoxic multinodular goiter 02/09/2017    Rheumatoid arthritis(714.0)        Home Medications:  Prior to Admission medications    Medication Sig Start Date End Date Taking? Authorizing Provider   atorvastatin (LIPITOR) 10 MG tablet Take 10 mg by mouth once daily. 6/20/22   Provider, Historical   clindamycin-benzoyl peroxide gel Apply topically every morning. 5/11/23   Provider, Historical   cyanocobalamin 1,000 mcg/mL injection INJECT 1 ML (1,000 MCG TOTAL) INTO THE MUSCLE EVERY 30 DAYS.  Patient not taking: Reported on 9/19/2024 11/6/23   Jacob Ahmadi MD   ethynodiol-ethinyl estradiol (KELNOR) 1-35 mg-mcg per tablet Take 1 tablet by mouth once daily. 9/19/24 9/19/25  Charanjit Anne MD   etodolac (LODINE XL) 400 MG 24 hr tablet TAKE 1 TABLET BY MOUTH ONCE DAILY 8/1/24   Monique Burk MD   levothyroxine (SYNTHROID) 75 MCG tablet Take 75 mcg by mouth before breakfast.    Provider, Historical   magnesium oxide (MAG-OX) 400 mg tablet Take 400 mg by mouth once daily.    Provider, Historical   semaglutide (OZEMPIC) 1 mg/dose (4 mg/3 mL)  10/19/20    Provider, Historical   spironolactone (ALDACTONE) 100 MG tablet Take 1 tablet (100 mg total) by mouth once daily. 2/2/23   Jacob Ahmadi MD   levothyroxine (TIROSINT) 88 mcg Cap Take 88 mcg by mouth Daily.  9/27/24  Provider, Historical       Allergies:  Review of patient's allergies indicates:   Allergen Reactions    Bactrim [sulfamethoxazole-trimethoprim] Hives       Social History:  Social History     Tobacco Use    Smoking status: Never    Smokeless tobacco: Never    Tobacco comments:     drug rep   Substance Use Topics    Alcohol use: Not Currently     Comment: social    Drug use: Never         Review of Systems   Constitutional:  Negative for activity change and unexpected weight change.   HENT:  Negative for hearing loss, rhinorrhea and trouble swallowing.    Eyes:  Negative for discharge and visual disturbance.   Respiratory:  Negative for chest tightness and wheezing.    Cardiovascular:  Negative for chest pain and palpitations.   Gastrointestinal:  Negative for blood in stool, constipation, diarrhea and vomiting.   Endocrine: Negative for polydipsia and polyuria.   Genitourinary:  Positive for dysuria. Negative for difficulty urinating, hematuria and menstrual problem.   Musculoskeletal:  Negative for arthralgias, joint swelling and neck pain.   Neurological:  Negative for weakness and headaches.   Psychiatric/Behavioral:  Negative for confusion and dysphoric mood.            Objective:   /68 (BP Location: Left arm)   Wt 79.8 kg (175 lb 14.8 oz)   BMI 25.98 kg/m²        General: AAO x3, no apparent distress  HEENT: PERRL, OP clear  CV: RRR, no m/r/g  Pulm: Lungs CTAB, no crackles, no wheezes  Abd: s/NT/ND +BS  Extremities: no c/c/e    Labs:         Assessment/Plan     Kristina was seen today for recurrent utis.    Diagnoses and all orders for this visit:    Dysuria  -     Cancel: Urinalysis Microscopic  -     Urine culture; Future  -     Urinalysis Microscopic; Future    F/u Urine culture  and UA  Consider starting empiric antibiotic if UA abnormal    RTC prn and with PCP as per routine.    Dolores Marks MD  Department of Internal Medicine - [facility]  09/27/2024

## 2024-09-29 LAB — BACTERIA UR CULT: ABNORMAL

## 2024-09-30 ENCOUNTER — PATIENT MESSAGE (OUTPATIENT)
Dept: PRIMARY CARE CLINIC | Facility: CLINIC | Age: 54
End: 2024-09-30
Payer: COMMERCIAL

## 2024-09-30 DIAGNOSIS — R30.0 DYSURIA: Primary | ICD-10-CM

## 2024-09-30 RX ORDER — NITROFURANTOIN 25; 75 MG/1; MG/1
100 CAPSULE ORAL 2 TIMES DAILY
Qty: 14 CAPSULE | Refills: 0 | Status: SHIPPED | OUTPATIENT
Start: 2024-09-30 | End: 2024-10-04

## 2024-10-04 ENCOUNTER — OFFICE VISIT (OUTPATIENT)
Dept: PRIMARY CARE CLINIC | Facility: CLINIC | Age: 54
End: 2024-10-04
Payer: COMMERCIAL

## 2024-10-04 VITALS
HEIGHT: 69 IN | DIASTOLIC BLOOD PRESSURE: 80 MMHG | TEMPERATURE: 97 F | HEART RATE: 80 BPM | WEIGHT: 177.94 LBS | OXYGEN SATURATION: 98 % | SYSTOLIC BLOOD PRESSURE: 118 MMHG | BODY MASS INDEX: 26.35 KG/M2 | RESPIRATION RATE: 18 BRPM

## 2024-10-04 DIAGNOSIS — N30.00 ACUTE CYSTITIS WITHOUT HEMATURIA: Primary | ICD-10-CM

## 2024-10-04 DIAGNOSIS — E89.2 HYPOPARATHYROIDISM AFTER PROCEDURE: ICD-10-CM

## 2024-10-04 DIAGNOSIS — G47.00 INSOMNIA, UNSPECIFIED TYPE: ICD-10-CM

## 2024-10-04 PROCEDURE — 99999 PR PBB SHADOW E&M-EST. PATIENT-LVL IV: CPT | Mod: PBBFAC,,, | Performed by: INTERNAL MEDICINE

## 2024-10-04 RX ORDER — TRAZODONE HYDROCHLORIDE 100 MG/1
100 TABLET ORAL NIGHTLY
Qty: 30 TABLET | Refills: 0 | Status: SHIPPED | OUTPATIENT
Start: 2024-10-04 | End: 2025-10-04

## 2024-10-04 RX ORDER — FLUCONAZOLE 150 MG/1
150 TABLET ORAL DAILY
Qty: 1 TABLET | Refills: 0 | Status: SHIPPED | OUTPATIENT
Start: 2024-10-04 | End: 2024-10-05

## 2024-10-04 NOTE — PROGRESS NOTES
[facility] Note    Chief Complaint      Chief Complaint   Patient presents with    Hospital Follow Up    Insomnia       History of Present Illness      Kristina Graham is a 53 y.o. female who presents today for   Chief Complaint   Patient presents with    Hospital Follow Up    Insomnia   .  Patient comes to appointment here for acute visit related to above . Jsut had visit to er for uti . Was started on doxycyline on 10 /1 . Had urology f/u visit iwht dr manzo today .     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    Overview     Is seeing dr dill endo cont per there recs          Acute cystitis without hematuria - Primary    Overview     Cont doxycycline , add fluconazole as she sometimes gets yeast infection          Insomnia    Overview     Trazodone 100 mg po qhs prn insomnia               Past Medical History:  Past Medical History:   Diagnosis Date    Annual physical exam 2024    Arthritis     Mixed hyperlipidemia 2022    Nontoxic multinodular goiter 2017    Rheumatoid arthritis(714.0)        Past Surgical History:  Past Surgical History:   Procedure Laterality Date     SECTION, LOW TRANSVERSE      x2    REPAIR OF TENDON OF LOWER EXTREMITY Right 2023    Procedure: REPAIR, TENDON, LOWER EXTREMITY;  Surgeon: Jessee HENRIQUEZ II(Field) MD Eugenio;  Location: [facility];  Service: Orthopedics;  Laterality: Right;    THYROIDECTOMY         Family History:  family history includes Arthritis in her mother; Asthma in her mother; Cancer in her father, maternal aunt, maternal grandmother, mother, and paternal uncle; Cataracts in her mother; Diabetes in her maternal grandmother and mother; Glaucoma in her maternal grandfather, maternal grandmother, and mother; Heart disease in her father, paternal grandfather, and paternal grandmother; Hypertension in her father, maternal grandmother, mother, paternal grandfather, paternal  grandmother, paternal uncle, and sister.    Social History:  Social History     Socioeconomic History    Marital status:    Tobacco Use    Smoking status: Never    Smokeless tobacco: Never    Tobacco comments:     drug rep   Substance and Sexual Activity    Alcohol use: Not Currently     Comment: social    Drug use: Never    Sexual activity: Yes     Partners: Male     Birth control/protection: Partner-Vasectomy     Social Drivers of Health     Financial Resource Strain: Low Risk  (10/3/2024)    Received from [facility]    Overall Financial Resource Strain (CARDIA)     Difficulty of Paying Living Expenses: Not hard at all   Food Insecurity: No Food Insecurity (10/3/2024)    Received from [facility]    Hunger Vital Sign     Worried About Running Out of Food in the Last Year: Never true     Ran Out of Food in the Last Year: Never true   Transportation Needs: Unmet Transportation Needs (10/3/2024)    Received from [facility]    PRAPARE - Transportation     Lack of Transportation (Medical): Yes     Lack of Transportation (Non-Medical): No   Physical Activity: Sufficiently Active (10/3/2024)    Received from [facility]    Exercise Vital Sign     Days of Exercise per Week: 3 days     Minutes of Exercise per Session: 60 min   Stress: No Stress Concern Present (10/3/2024)    Received from [facility]    [employer] - Occupational Stress Questionnaire     Feeling of Stress : Only a little   Housing Stability: Low Risk  (2/6/2024)    Received from [facility], [facility]    Housing Stability Vital Sign     Unable to Pay for Housing in the Last Year: No     Number of Places Lived in the Last Year: 1     Unstable Housing in the Last Year: No       Review of Systems:   Review of Systems   Constitutional:  Negative for fever and weight loss.   HENT:  Negative for congestion, hearing loss and sore throat.    Eyes:  Negative for blurred vision.   Respiratory:  Negative for cough and shortness  of breath.    Cardiovascular:  Negative for chest pain, palpitations, claudication and leg swelling.   Gastrointestinal:  Negative for abdominal pain, constipation, diarrhea and heartburn.   Genitourinary:  Negative for dysuria.   Musculoskeletal:  Negative for back pain and myalgias.   Skin:  Negative for rash.   Neurological:  Negative for focal weakness and headaches.   Psychiatric/Behavioral:  Negative for depression and suicidal ideas. The patient has insomnia. The patient is not nervous/anxious.          Medications:  Outpatient Encounter Medications as of 10/4/2024   Medication Sig Dispense Refill    atorvastatin (LIPITOR) 10 MG tablet Take 10 mg by mouth once daily.      clindamycin-benzoyl peroxide gel Apply topically every morning.      cyanocobalamin 1,000 mcg/mL injection INJECT 1 ML (1,000 MCG TOTAL) INTO THE MUSCLE EVERY 30 DAYS. 3 mL 13    doxycycline (VIBRAMYCIN) 100 MG Cap Take 1 capsule (100 mg total) by mouth 2 (two) times daily. for 10 days 20 capsule 0    ethynodiol-ethinyl estradiol (KELNOR) 1-35 mg-mcg per tablet Take 1 tablet by mouth once daily. 90 tablet 3    etodolac (LODINE XL) 400 MG 24 hr tablet TAKE 1 TABLET BY MOUTH ONCE DAILY 90 tablet 0    levothyroxine (SYNTHROID) 75 MCG tablet Take 75 mcg by mouth before breakfast.      magnesium oxide (MAG-OX) 400 mg tablet Take 400 mg by mouth once daily.      ondansetron (ZOFRAN-ODT) 4 MG TbDL Take 1 tablet (4 mg total) by mouth every 8 (eight) hours as needed. 10 tablet 0    semaglutide (OZEMPIC) 1 mg/dose (4 mg/3 mL)       spironolactone (ALDACTONE) 100 MG tablet Take 1 tablet (100 mg total) by mouth once daily. 30 tablet 6    fluconazole (DIFLUCAN) 150 MG Tab Take 1 tablet (150 mg total) by mouth once daily. for 1 day 1 tablet 0    traZODone (DESYREL) 100 MG tablet Take 1 tablet (100 mg total) by mouth every evening. 30 tablet 0    [DISCONTINUED] nitrofurantoin, macrocrystal-monohydrate, (MACROBID) 100 MG capsule Take 1 capsule (100 mg  total) by mouth 2 (two) times daily. for 7 days (Patient not taking: Reported on 10/4/2024) 14 capsule 0     No facility-administered encounter medications on file as of 10/4/2024.        Allergies:  Review of patient's allergies indicates:   Allergen Reactions    Bactrim [sulfamethoxazole-trimethoprim] Hives         Physical Exam         Vitals:    10/04/24 1106   BP: 118/80   Pulse: 80   Resp: 18   Temp: 97 °F (36.1 °C)         Physical Exam  Constitutional:       Appearance: She is well-developed.   Eyes:      Pupils: Pupils are equal, round, and reactive to light.   Neck:      Thyroid: No thyromegaly.   Cardiovascular:      Rate and Rhythm: Normal rate.      Heart sounds: Normal heart sounds. No murmur heard.     No friction rub. No gallop.   Pulmonary:      Breath sounds: Normal breath sounds.   Abdominal:      General: Bowel sounds are normal.      Palpations: Abdomen is soft.   Musculoskeletal:         General: Normal range of motion.      Cervical back: Normal range of motion.   Lymphadenopathy:      Cervical: No cervical adenopathy.   Skin:     General: Skin is warm.      Findings: No rash.   Neurological:      Mental Status: She is alert and oriented to person, place, and time.      Cranial Nerves: No cranial nerve deficit.   Psychiatric:         Behavior: Behavior normal.          Laboratory:  CBC:  Recent Labs   Lab Result Units 08/01/24  1113 10/01/24  1614   WBC K/uL 6.8 15.11*   RBC M/uL 4.72 4.78   Hemoglobin g/dL 14.4 14.6   Hematocrit % 44.8 43.1   Platelets K/uL 257 234   MCV fL 94.9 90   MCH pg 30.5 30.5   MCHC g/dL 32.1 33.9     CMP:  Recent Labs   Lab Result Units 07/29/24  1259 07/29/24  1304 08/01/24  1113 08/01/24  1116 10/01/24  1614   Glucose mg/dL 101   < > 81  --  101   Calcium mg/dL 9.2   < > 9.6   < > 9.5   Albumin g/dL  --    < > 4.4  --  4.2   Albumin Level  4.3   < >  --    < >  --    Total Protein g/dL  --    < > 7.3  --  8.1   Sodium mmol/L 137   < > 141  --  135*   Potassium  "mmol/L 4.8   < > 4.3  --  3.9   CO2 mmol/L  --    < > 28  --  24   Carbon Dioxide mmol/L 26  --   --   --   --    Chloride mmol/L  --    < > 104  --  101   BUN mg/dL  --    < > 15  --  13   Blood Urea Nitrogen mg/dL 15  --   --   --   --    Alkaline Phosphatase U/L 74  --   --   --  88   ALT U/L 12   < > 15  --  57*   AST U/L 12   < > 14  --  37   Total Bilirubin mg/dL 0.6  --  0.7  --  0.6    < > = values in this interval not displayed.     URINALYSIS:  Recent Labs   Lab Result Units 10/01/24  1607   Color, UA  Yellow   Specific Gravity, UA  1.025   pH, UA  6.0   Protein, UA  Negative   Bacteria /hpf Rare   Nitrite, UA  Negative   Leukocytes, UA  Negative   Urobilinogen, UA EU/dL Negative      LIPIDS:  No results for input(s): "TSH", "HDL", "CHOL", "TRIG", "LDLCALC", "CHOLHDL", "NONHDLCHOL", "TOTALCHOLEST" in the last 2160 hours.  TSH:  No results for input(s): "TSH" in the last 2160 hours.  A1C:  No results for input(s): "HGBA1C" in the last 2160 hours.    Radiology:        Assessment:     Kristina Graham is a 53 y.o.female with:    Acute cystitis without hematuria  -     fluconazole (DIFLUCAN) 150 MG Tab; Take 1 tablet (150 mg total) by mouth once daily. for 1 day  Dispense: 1 tablet; Refill: 0    Insomnia, unspecified type  -     traZODone (DESYREL) 100 MG tablet; Take 1 tablet (100 mg total) by mouth every evening.  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  Return to clinic in 6 months.      Jacob ESCALANTE" Dantagnan [facility]

## 2024-10-29 ENCOUNTER — TELEPHONE (OUTPATIENT)
Dept: OPTOMETRY | Facility: CLINIC | Age: 54
End: 2024-10-29
Payer: COMMERCIAL

## 2024-10-29 ENCOUNTER — PATIENT MESSAGE (OUTPATIENT)
Dept: OPTOMETRY | Facility: CLINIC | Age: 54
End: 2024-10-29
Payer: COMMERCIAL

## 2024-10-31 DIAGNOSIS — M25.571 CHRONIC PAIN OF BOTH ANKLES: ICD-10-CM

## 2024-10-31 DIAGNOSIS — M25.572 CHRONIC PAIN OF BOTH ANKLES: ICD-10-CM

## 2024-10-31 DIAGNOSIS — M79.645 BILATERAL THUMB PAIN: ICD-10-CM

## 2024-10-31 DIAGNOSIS — M79.644 BILATERAL THUMB PAIN: ICD-10-CM

## 2024-10-31 DIAGNOSIS — G89.29 CHRONIC PAIN OF BOTH ANKLES: ICD-10-CM

## 2024-11-05 RX ORDER — ETODOLAC 400 MG/1
400 TABLET, EXTENDED RELEASE ORAL
Qty: 90 TABLET | Refills: 0 | Status: SHIPPED | OUTPATIENT
Start: 2024-11-05

## 2024-11-25 ENCOUNTER — PATIENT MESSAGE (OUTPATIENT)
Dept: RHEUMATOLOGY | Facility: CLINIC | Age: 54
End: 2024-11-25
Payer: COMMERCIAL

## 2024-11-28 ENCOUNTER — HOSPITAL ENCOUNTER (EMERGENCY)
Facility: HOSPITAL | Age: 54
Discharge: HOME | End: 2024-11-28
Attending: EMERGENCY MEDICINE
Payer: COMMERCIAL

## 2024-11-28 VITALS
TEMPERATURE: 98 F | DIASTOLIC BLOOD PRESSURE: 71 MMHG | BODY MASS INDEX: 26.22 KG/M2 | RESPIRATION RATE: 17 BRPM | HEIGHT: 69 IN | WEIGHT: 177 LBS | OXYGEN SATURATION: 97 % | SYSTOLIC BLOOD PRESSURE: 119 MMHG | HEART RATE: 57 BPM

## 2024-11-28 DIAGNOSIS — M54.50 ACUTE MIDLINE LOW BACK PAIN WITHOUT SCIATICA: Primary | ICD-10-CM

## 2024-11-28 LAB
ALBUMIN SERPL BCP-MCNC: 4.1 G/DL (ref 3.5–5.2)
ALP SERPL-CCNC: 105 U/L (ref 40–150)
ALT SERPL W/O P-5'-P-CCNC: 16 U/L (ref 10–44)
ANION GAP SERPL CALC-SCNC: 8 MMOL/L (ref 8–16)
AST SERPL-CCNC: 14 U/L (ref 10–40)
BASOPHILS # BLD AUTO: 0.08 K/UL (ref 0–0.2)
BASOPHILS NFR BLD: 0.6 % (ref 0–1.9)
BILIRUB SERPL-MCNC: 0.4 MG/DL (ref 0.1–1)
BILIRUB UR QL STRIP: NEGATIVE
BUN SERPL-MCNC: 14 MG/DL (ref 6–20)
CA-I BLDV-SCNC: 1.14 MMOL/L (ref 1.06–1.42)
CALCIUM SERPL-MCNC: 9.3 MG/DL (ref 8.7–10.5)
CHLORIDE SERPL-SCNC: 105 MMOL/L (ref 95–110)
CK SERPL-CCNC: 57 U/L (ref 20–180)
CLARITY UR REFRACT.AUTO: CLEAR
CO2 SERPL-SCNC: 27 MMOL/L (ref 23–29)
COLOR UR AUTO: COLORLESS
CREAT SERPL-MCNC: 0.9 MG/DL (ref 0.5–1.4)
DIFFERENTIAL METHOD BLD: ABNORMAL
EOSINOPHIL # BLD AUTO: 0.2 K/UL (ref 0–0.5)
EOSINOPHIL NFR BLD: 1.5 % (ref 0–8)
ERYTHROCYTE [DISTWIDTH] IN BLOOD BY AUTOMATED COUNT: 12.6 % (ref 11.5–14.5)
EST. GFR  (NO RACE VARIABLE): >60 ML/MIN/1.73 M^2
GLUCOSE SERPL-MCNC: 103 MG/DL (ref 70–110)
GLUCOSE UR QL STRIP: NEGATIVE
HCT VFR BLD AUTO: 42.3 % (ref 37–48.5)
HCV AB SERPL QL IA: NORMAL
HGB BLD-MCNC: 14.2 G/DL (ref 12–16)
HGB UR QL STRIP: NEGATIVE
HIV 1+2 AB+HIV1 P24 AG SERPL QL IA: NORMAL
IMM GRANULOCYTES # BLD AUTO: 0.12 K/UL (ref 0–0.04)
IMM GRANULOCYTES NFR BLD AUTO: 0.9 % (ref 0–0.5)
KETONES UR QL STRIP: NEGATIVE
LEUKOCYTE ESTERASE UR QL STRIP: NEGATIVE
LYMPHOCYTES # BLD AUTO: 2.5 K/UL (ref 1–4.8)
LYMPHOCYTES NFR BLD: 18.4 % (ref 18–48)
MCH RBC QN AUTO: 30.5 PG (ref 27–31)
MCHC RBC AUTO-ENTMCNC: 33.6 G/DL (ref 32–36)
MCV RBC AUTO: 91 FL (ref 82–98)
MONOCYTES # BLD AUTO: 1 K/UL (ref 0.3–1)
MONOCYTES NFR BLD: 7 % (ref 4–15)
NEUTROPHILS # BLD AUTO: 9.9 K/UL (ref 1.8–7.7)
NEUTROPHILS NFR BLD: 71.6 % (ref 38–73)
NITRITE UR QL STRIP: NEGATIVE
NRBC BLD-RTO: 0 /100 WBC
PH UR STRIP: 7 [PH] (ref 5–8)
PLATELET # BLD AUTO: 238 K/UL (ref 150–450)
PMV BLD AUTO: 9.6 FL (ref 9.2–12.9)
POTASSIUM SERPL-SCNC: 4.5 MMOL/L (ref 3.5–5.1)
PROT SERPL-MCNC: 7.6 G/DL (ref 6–8.4)
PROT UR QL STRIP: NEGATIVE
RBC # BLD AUTO: 4.66 M/UL (ref 4–5.4)
SODIUM SERPL-SCNC: 140 MMOL/L (ref 136–145)
SP GR UR STRIP: 1.01 (ref 1–1.03)
URN SPEC COLLECT METH UR: ABNORMAL
WBC # BLD AUTO: 13.84 K/UL (ref 3.9–12.7)

## 2024-11-28 PROCEDURE — 87389 HIV-1 AG W/HIV-1&-2 AB AG IA: CPT | Performed by: PHYSICIAN ASSISTANT

## 2024-11-28 PROCEDURE — 86803 HEPATITIS C AB TEST: CPT | Performed by: PHYSICIAN ASSISTANT

## 2024-11-28 PROCEDURE — 81003 URINALYSIS AUTO W/O SCOPE: CPT | Performed by: EMERGENCY MEDICINE

## 2024-11-28 PROCEDURE — 96374 THER/PROPH/DIAG INJ IV PUSH: CPT

## 2024-11-28 PROCEDURE — 82330 ASSAY OF CALCIUM: CPT | Performed by: EMERGENCY MEDICINE

## 2024-11-28 PROCEDURE — 96375 TX/PRO/DX INJ NEW DRUG ADDON: CPT

## 2024-11-28 PROCEDURE — 80053 COMPREHEN METABOLIC PANEL: CPT | Performed by: EMERGENCY MEDICINE

## 2024-11-28 PROCEDURE — 82550 ASSAY OF CK (CPK): CPT | Performed by: EMERGENCY MEDICINE

## 2024-11-28 PROCEDURE — 99284 EMERGENCY DEPT VISIT MOD MDM: CPT

## 2024-11-28 PROCEDURE — 85025 COMPLETE CBC W/AUTO DIFF WBC: CPT | Performed by: EMERGENCY MEDICINE

## 2024-11-28 PROCEDURE — 63600175 PHARM REV CODE 636 W HCPCS: Performed by: EMERGENCY MEDICINE

## 2024-11-28 RX ORDER — KETOROLAC TROMETHAMINE 30 MG/ML
15 INJECTION, SOLUTION INTRAMUSCULAR; INTRAVENOUS
Status: COMPLETED | OUTPATIENT
Start: 2024-11-28 | End: 2024-11-28

## 2024-11-28 RX ORDER — ONDANSETRON HYDROCHLORIDE 2 MG/ML
4 INJECTION, SOLUTION INTRAVENOUS
Status: COMPLETED | OUTPATIENT
Start: 2024-11-28 | End: 2024-11-28

## 2024-11-28 RX ADMIN — KETOROLAC TROMETHAMINE 15 MG: 30 INJECTION, SOLUTION INTRAMUSCULAR; INTRAVENOUS at 02:11

## 2024-11-28 RX ADMIN — ONDANSETRON 4 MG: 2 INJECTION INTRAMUSCULAR; INTRAVENOUS at 02:11

## 2024-11-28 NOTE — DISCHARGE INSTRUCTIONS
In addition to following up with your primary care provider, schedule close follow-up with your endocrinologist as well.    For pain:  Alternate acetaminophen 650 mg every 4-6 hours and ibuprofen 400-600 mg every 6-8 hours as needed for pain.      We know that you have many choices and are honored that you chose us. We hope that we met or exceeded your expectations and goals for this visit and will keep the [facility] family in mind for your future needs and those of your family and friends.     - Dr. Flores

## 2024-11-28 NOTE — ED PROVIDER NOTES
Emergency Department Provider Note    Kristina Graham   54 y.o. female   [id number]      2024       History     This history was obtained from the patient without limitations.  I also reviewed available records in care everywhere.  She was driven to the ED by her  who is at the bedside. She was evaluated in urology clinic at [facility] on  and prescribed Augmentin for bacterial urinary tract infection.  She underwent cystoscopy on  for recurrent urinary tract infections.  There was an abnormal finding adjacent to the left ureteral orifice which was biopsied.  She presents with intermittent pain in the middle of her lower back for the past day.  She describes it as a severe dull, achy discomfort that occurs in waves.  She has associated nausea but has not vomited.  She denies fever and chills.  She denies chest pain and shortness of breath.  She denies abdominal pain.  She denies dysuria, difficulty urinating, and gross hematuria.  She has not attempted treatment with oral analgesics, topical analgesics, or heat/cold therapy.         Past Medical History:   Diagnosis Date    Annual physical exam 2024    Arthritis     Mixed hyperlipidemia 2022    Nontoxic multinodular goiter 2017    Rheumatoid arthritis(714.0)       Past Surgical History:   Procedure Laterality Date     SECTION, LOW TRANSVERSE      x2    REPAIR OF TENDON OF LOWER EXTREMITY Right 2023    Procedure: REPAIR, TENDON, LOWER EXTREMITY;  Surgeon: Jessee Reyna) MD Eugenio;  Location: [facility];  Service: Orthopedics;  Laterality: Right;    THYROIDECTOMY        Family History   Problem Relation Name Age of Onset    Cancer Mother Kristina Garnettlelo     Diabetes Mother Kristina Graham     Hypertension Mother Kristina Santosx     Cataracts Mother Kristina Santosx     Glaucoma Mother Kristina Graham     Arthritis Mother Kristina Santosx     Asthma Mother Kristina Santosx     Cancer Father Kristina Santosx      Hypertension Father Kristina Faucheux     Heart disease Father Kristina Faucheux     Heart disease Paternal Grandfather Kristina Faucheux     Hypertension Paternal Grandfather Kristina Faucheux     Hypertension Sister Kristina Faucheux     Cancer Maternal Aunt Kristina Faucheux     Cancer Paternal Uncle Kristina Faucheux     Hypertension Paternal Uncle Kristina Faucheux     Cancer Maternal Grandmother Kristina Faucheux     Diabetes Maternal Grandmother Kristina Faucheux     Hypertension Maternal Grandmother Kristina Faucheux     Glaucoma Maternal Grandmother Kristina Faucheux     Heart disease Paternal Grandmother Kristina Faucheux     Hypertension Paternal Grandmother Kristina Faucheux     Glaucoma Maternal Grandfather      Amblyopia Neg Hx      Blindness Neg Hx      Macular degeneration Neg Hx      Retinal detachment Neg Hx      Strabismus Neg Hx        Social History     Socioeconomic History    Marital status:    Tobacco Use    Smoking status: Never    Smokeless tobacco: Never    Tobacco comments:     drug rep   Substance and Sexual Activity    Alcohol use: Not Currently     Comment: social    Drug use: Never    Sexual activity: Yes     Partners: Male     Birth control/protection: Partner-Vasectomy     Social Drivers of Health     Financial Resource Strain: Low Risk  (10/3/2024)    Received from [facility]    Overall Financial Resource Strain (CARDIA)     Difficulty of Paying Living Expenses: Not hard at all   Food Insecurity: No Food Insecurity (10/3/2024)    Received from [facility]    Hunger Vital Sign     Worried About Running Out of Food in the Last Year: Never true     Ran Out of Food in the Last Year: Never true   Transportation Needs: Unmet Transportation Needs (10/3/2024)    Received from [facility]    PRAPARE - Transportation     Lack of Transportation (Medical): Yes     Lack of Transportation (Non-Medical): No   Physical Activity: Sufficiently Active (10/3/2024)    Received from [facility]    Exercise  Vital Sign     Days of Exercise per Week: 3 days     Minutes of Exercise per Session: 60 min   Stress: No Stress Concern Present (10/3/2024)    Received from [facility]    [employer] - Occupational Stress Questionnaire     Feeling of Stress : Only a little   Housing Stability: Low Risk  (2/6/2024)    Received from [facility], [facility]    Housing Stability Vital Sign     Unable to Pay for Housing in the Last Year: No     Number of Places Lived in the Last Year: 1     Unstable Housing in the Last Year: No      Review of patient's allergies indicates:   Allergen Reactions    Bactrim [sulfamethoxazole-trimethoprim] Hives           Physical Examination     Initial Vitals [11/28/24 0040]   BP Pulse Resp Temp SpO2   (!) 204/95 65 20 97.7 °F (36.5 °C) 100 %      MAP       --           Physical Exam    Nursing note and vitals reviewed.  Constitutional: She is not diaphoretic. No distress.   HENT:   Head: Normocephalic and atraumatic.   Eyes: Conjunctivae are normal. No scleral icterus.   Neck: No JVD present.   Cardiovascular:  Normal rate, regular rhythm and normal heart sounds.     Exam reveals no gallop and no friction rub.       No murmur heard.  Pulmonary/Chest: Effort normal and breath sounds normal. No stridor. No respiratory distress. She has no decreased breath sounds. She has no wheezes. She has no rhonchi. She has no rales.   Abdominal: Abdomen is soft. She exhibits no distension. There is no abdominal tenderness.   Musculoskeletal:         General: No edema.      Thoracic back: No swelling, deformity, spasms or tenderness.      Lumbar back: No swelling, deformity, spasms or tenderness.     Neurological: She is alert and oriented to person, place, and time. Gait normal. GCS score is 15. GCS eye subscore is 4. GCS verbal subscore is 5. GCS motor subscore is 6.   Skin: Skin is warm and dry. No pallor.   Psychiatric: She has a normal mood and affect. Her speech is normal and  behavior is normal. She is not actively hallucinating. She is attentive.            Labs     Labs Reviewed   CBC W/ AUTO DIFFERENTIAL - Abnormal       Result Value    WBC 13.84 (*)     RBC 4.66      Hemoglobin 14.2      Hematocrit 42.3      MCV 91      MCH 30.5      MCHC 33.6      RDW 12.6      Platelets 238      MPV 9.6      Immature Granulocytes 0.9 (*)     Gran # (ANC) 9.9 (*)     Immature Grans (Abs) 0.12 (*)     Lymph # 2.5      Mono # 1.0      Eos # 0.2      Baso # 0.08      nRBC 0      Gran % 71.6      Lymph % 18.4      Mono % 7.0      Eosinophil % 1.5      Basophil % 0.6      Differential Method Automated     URINALYSIS, REFLEX TO URINE CULTURE - Abnormal    Specimen UA Urine, Clean Catch      Color, UA Colorless (*)     Appearance, UA Clear      pH, UA 7.0      Specific Gravity, UA 1.010      Protein, UA Negative      Glucose, UA Negative      Ketones, UA Negative      Bilirubin (UA) Negative      Occult Blood UA Negative      Nitrite, UA Negative      Leukocytes, UA Negative      Narrative:     Specimen Source->Urine   HEPATITIS C ANTIBODY    Hepatitis C Ab Non-reactive      Narrative:     Release to patient->Immediate   HIV 1 / 2 ANTIBODY    HIV 1/2 Ag/Ab Non-reactive      Narrative:     Release to patient->Immediate   CALCIUM, IONIZED    Ionized Calcium 1.14     CK    CPK 57     COMPREHENSIVE METABOLIC PANEL    Sodium 140      Potassium 4.5      Chloride 105      CO2 27      Glucose 103      BUN 14      Creatinine 0.9      Calcium 9.3      Total Protein 7.6      Albumin 4.1      Total Bilirubin 0.4      Alkaline Phosphatase 105      AST 14      ALT 16      eGFR >60.0      Anion Gap 8          Imaging     Imaging Results    None           ED Course     The patient received the following medications:  Medications   ketorolac injection 15 mg (15 mg Intravenous Given 11/28/24 0219)   ondansetron injection 4 mg (4 mg Intravenous Given 11/28/24 0218)       Procedures          Medical Decision Making                  Medical Decision Making            Diagnoses       ICD-10-CM ICD-9-CM   1. Acute midline low back pain without sciatica  M54.50 724.2         Dispostion      ED Disposition Condition    Discharge Stable            ED Prescriptions    None         Follow-up Information       Follow up With Specialties Details Why Contact Info    Jacob Ahmadi MD Internal Medicine  Schedule a close in-person or virtual ER follow-up visit with your primary care provider. [address]  [address] , [address]  [facility] [address]  [phone]      ER   Return to the ER if your condition worsens or you have any other concerns that you feel need immediate attention.               Omid Flores III, MD  12/09/24 9661

## 2024-11-28 NOTE — ED TRIAGE NOTES
Kristina Graham, a 54 y.o. female presents to the ED w/ complaint of back pain 7/10 and abnormal labs. Pt recently had labs drawn and told calcium and magnesium levels were low. Yesterday morning woke up with back pain that has been getting increasingly worse. Took PO supplements for Ca and Mag. Pt reports back pain is 7/10 and squeezing in nature. Pain comes in waves of intensity.     Triage note:  Chief Complaint   Patient presents with    Back Pain     Got bloodwork doneTuesday and told that her calcium and magnesium levels were low, went home and took PO supplements and now states that her back is hurting and she just doesn't feel right.      Review of patient's allergies indicates:   Allergen Reactions    Bactrim [sulfamethoxazole-trimethoprim] Hives     Past Medical History:   Diagnosis Date    Annual physical exam 1/19/2024    Arthritis     Mixed hyperlipidemia 08/19/2022    Nontoxic multinodular goiter 02/09/2017    Rheumatoid arthritis(714.0)

## 2025-01-03 ENCOUNTER — OFFICE VISIT (OUTPATIENT)
Dept: OPTOMETRY | Facility: CLINIC | Age: 55
End: 2025-01-03
Payer: COMMERCIAL

## 2025-01-03 DIAGNOSIS — H16.223 KERATOCONJUNCTIVITIS SICCA, NOT SPECIFIED AS SJOGREN'S, BILATERAL: Primary | ICD-10-CM

## 2025-01-03 DIAGNOSIS — H02.886 MEIBOMIAN GLAND DYSFUNCTION (MGD) OF BOTH EYES: ICD-10-CM

## 2025-01-03 DIAGNOSIS — H02.883 MEIBOMIAN GLAND DYSFUNCTION (MGD) OF BOTH EYES: ICD-10-CM

## 2025-01-03 PROCEDURE — 99999 PR PBB SHADOW E&M-EST. PATIENT-LVL III: CPT | Mod: PBBFAC,,, | Performed by: OPTOMETRIST

## 2025-01-03 RX ORDER — AMOXICILLIN AND CLAVULANATE POTASSIUM 875; 125 MG/1; MG/1
1 TABLET, FILM COATED ORAL 2 TIMES DAILY
COMMUNITY
Start: 2024-11-08

## 2025-01-03 RX ORDER — CALCITRIOL 0.25 UG/1
0.25 CAPSULE ORAL 2 TIMES DAILY
COMMUNITY

## 2025-01-03 RX ORDER — FEZOLINETANT 45 MG/1
1 TABLET, FILM COATED ORAL DAILY
COMMUNITY
Start: 2024-12-23

## 2025-01-03 RX ORDER — METHYLPREDNISOLONE 4 MG/1
TABLET ORAL
COMMUNITY
Start: 2024-10-29

## 2025-01-03 RX ORDER — FLUCONAZOLE 100 MG/1
TABLET ORAL
COMMUNITY
Start: 2024-09-14

## 2025-01-03 RX ORDER — LEVOFLOXACIN 500 MG/1
500 TABLET, FILM COATED ORAL
COMMUNITY
Start: 2024-10-30

## 2025-01-03 RX ORDER — PERFLUOROHEXYLOCTANE 1 MG/MG
1 SOLUTION OPHTHALMIC 4 TIMES DAILY
Qty: 3 ML | Refills: 11 | Status: SHIPPED | OUTPATIENT
Start: 2025-01-03 | End: 2026-01-03

## 2025-01-03 RX ORDER — TRETINOIN 0.25 MG/G
CREAM TOPICAL
COMMUNITY
Start: 2024-07-24

## 2025-01-03 RX ORDER — PHENAZOPYRIDINE HYDROCHLORIDE 200 MG/1
200 TABLET, FILM COATED ORAL 3 TIMES DAILY
COMMUNITY
Start: 2024-09-14

## 2025-01-03 NOTE — PROGRESS NOTES
HPI    CC: Annual Eye Exam. (+)Dry Eye. Pt states that her dry eyes have gotten   progressively worse. Pt states that she occasionally sees halos around   things OS. She uses xiidra but not consistently.     JULITA: 7/27/2022 Dr. Solorio    (+) Changes in vision   (-) Pain  (+) Irritation: Dry Eye  (+) Itching: Excessively  (-) Flashes  (-) Floaters  (-) Glasses wearer  (-) CL wearer  (+) Uses eye gtts: Xiidra BID OU     Does patient want a refraction today? If needed    (-) Eye injury  (-) Eye surgery   (-)POHx  (-)FOHx    (-)DM       Last edited by Elizabeth Solorio, OD on 1/3/2025  4:04 PM.            Assessment /Plan     For exam results, see Encounter Report.    Keratoconjunctivitis sicca, not specified as Sjogren's, bilateral  -     perfluorohexyloctane, PF, (MIEBO, PF,) 100 % Drop; Place 1 drop into both eyes 4 (four) times daily.  Dispense: 3 mL; Refill: 11  -     lifitegrast (XIIDRA) 5 % Dpet; Place 1 drop into both eyes 2 (two) times daily.  Dispense: 60 each; Refill: 11    Meibomian gland dysfunction (MGD) of both eyes  -     perfluorohexyloctane, PF, (MIEBO, PF,) 100 % Drop; Place 1 drop into both eyes 4 (four) times daily.  Dispense: 3 mL; Refill: 11      Educated pt on findings. Significant dry eye OU. Pt to continue Xiidra 1 gtt OU BID. Discussed importance of using gtt consistently and educated on Xiidra's delayed effectiveness. Will also Rx miebo 1 gtt OU QID. Recommend additional ATs prn. If symptoms worsen or dont improve, RTC. Monitor 1-2 months.        RTC in 1-2 months for dry eye f/u or sooner if needed.

## 2025-01-13 ENCOUNTER — OFFICE VISIT (OUTPATIENT)
Dept: PRIMARY CARE CLINIC | Facility: CLINIC | Age: 55
End: 2025-01-13
Payer: COMMERCIAL

## 2025-01-13 VITALS
WEIGHT: 187.38 LBS | RESPIRATION RATE: 18 BRPM | OXYGEN SATURATION: 98 % | HEIGHT: 69 IN | HEART RATE: 76 BPM | DIASTOLIC BLOOD PRESSURE: 80 MMHG | BODY MASS INDEX: 27.75 KG/M2 | SYSTOLIC BLOOD PRESSURE: 140 MMHG

## 2025-01-13 DIAGNOSIS — R76.8 RHEUMATOID FACTOR POSITIVE: ICD-10-CM

## 2025-01-13 DIAGNOSIS — E89.2 HYPOPARATHYROIDISM AFTER PROCEDURE: Primary | ICD-10-CM

## 2025-01-13 DIAGNOSIS — E78.2 MIXED HYPERLIPIDEMIA: Chronic | ICD-10-CM

## 2025-01-13 DIAGNOSIS — E53.8 B12 DEFICIENCY: ICD-10-CM

## 2025-01-13 DIAGNOSIS — E89.0 S/P THYROIDECTOMY: ICD-10-CM

## 2025-01-13 DIAGNOSIS — G47.00 INSOMNIA, UNSPECIFIED TYPE: ICD-10-CM

## 2025-01-13 PROCEDURE — 3008F BODY MASS INDEX DOCD: CPT | Mod: CPTII,S$GLB,, | Performed by: INTERNAL MEDICINE

## 2025-01-13 PROCEDURE — 99999 PR PBB SHADOW E&M-EST. PATIENT-LVL IV: CPT | Mod: PBBFAC,,, | Performed by: INTERNAL MEDICINE

## 2025-01-13 PROCEDURE — 1159F MED LIST DOCD IN RCRD: CPT | Mod: CPTII,S$GLB,, | Performed by: INTERNAL MEDICINE

## 2025-01-13 PROCEDURE — 3079F DIAST BP 80-89 MM HG: CPT | Mod: CPTII,S$GLB,, | Performed by: INTERNAL MEDICINE

## 2025-01-13 PROCEDURE — 1160F RVW MEDS BY RX/DR IN RCRD: CPT | Mod: CPTII,S$GLB,, | Performed by: INTERNAL MEDICINE

## 2025-01-13 PROCEDURE — 3044F HG A1C LEVEL LT 7.0%: CPT | Mod: CPTII,S$GLB,, | Performed by: INTERNAL MEDICINE

## 2025-01-13 PROCEDURE — 99214 OFFICE O/P EST MOD 30 MIN: CPT | Mod: S$GLB,,, | Performed by: INTERNAL MEDICINE

## 2025-01-13 PROCEDURE — 3077F SYST BP >= 140 MM HG: CPT | Mod: CPTII,S$GLB,, | Performed by: INTERNAL MEDICINE

## 2025-01-13 NOTE — PROGRESS NOTES
[facility] Note    Chief Complaint      Chief Complaint   Patient presents with    Insomnia    Follow-up       History of Present Illness      Kristina Graham is a 54 y.o. female who presents today for   Chief Complaint   Patient presents with    Insomnia    Follow-up   .  Patient comes to appointment here for f/u for chronic issues as below . She is being managed by endocrine for thyroid , parathyroiud issues . She is also currently seeing a provider [facility] for issues with chronic issues with ankle pain .     Problem List Items Addressed This Visit       Rheumatoid factor positive    Overview     Is following with rheumatology currently.         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 Last lipid was 25 reviewed today          Insomnia    Overview     Trazodone 100 mg po qhs prn insomnia               Past Medical History:  Past Medical History:   Diagnosis Date    Annual physical exam 2024    Arthritis     Mixed hyperlipidemia 2022    Nontoxic multinodular goiter 2017    Rheumatoid arthritis(714.0)        Past Surgical History:  Past Surgical History:   Procedure Laterality Date     SECTION, LOW TRANSVERSE      x2    REPAIR OF TENDON OF LOWER EXTREMITY Right 2023    Procedure: REPAIR, TENDON, LOWER EXTREMITY;  Surgeon: Jessee HENRIQUEZ II() MD Eugenio;  [facility];  Service: Orthopedics;  Laterality: Right;    THYROIDECTOMY         Family History:  family history includes Arthritis in her mother; Asthma in her mother; Cancer in her father, maternal aunt, maternal grandmother, mother, and paternal uncle; Cataracts in her mother; Diabetes in her maternal grandmother and mother; Glaucoma in her maternal grandfather, maternal  grandmother, and mother; Heart disease in her father, paternal grandfather, and paternal grandmother; Hypertension in her father, maternal grandmother, mother, paternal grandfather, paternal grandmother, paternal uncle, and sister.    Social History:  Social History     Socioeconomic History    Marital status:    Tobacco Use    Smoking status: Never    Smokeless tobacco: Never    Tobacco comments:     drug rep   Substance and Sexual Activity    Alcohol use: Not Currently     Comment: social    Drug use: Never    Sexual activity: Yes     Partners: Male     Birth control/protection: Partner-Vasectomy     Social Drivers of Health     Financial Resource Strain: Low Risk  (10/3/2024)    Received from [facility]    Overall Financial Resource Strain (CARDIA)     Difficulty of Paying Living Expenses: Not hard at all   Food Insecurity: No Food Insecurity (10/3/2024)    Received from [facility]    Hunger Vital Sign     Worried About Running Out of Food in the Last Year: Never true     Ran Out of Food in the Last Year: Never true   Transportation Needs: Unmet Transportation Needs (10/3/2024)    Received from [facility]    PRAPARE - Transportation     Lack of Transportation (Medical): Yes     Lack of Transportation (Non-Medical): No   Physical Activity: Sufficiently Active (10/3/2024)    Received from [facility]    Exercise Vital Sign     Days of Exercise per Week: 3 days     Minutes of Exercise per Session: 60 min   Stress: No Stress Concern Present (10/3/2024)    Received from [facility]    [employer] - Occupational Stress Questionnaire     Feeling of Stress : Only a little   Housing Stability: Low Risk  (2/6/2024)    Received from [facility], [facility]    Housing Stability Vital Sign     Unable to Pay for Housing in the Last Year: No     Number of Places Lived in the Last Year: 1     Unstable Housing in the Last Year: No       Review of Systems:   Review of Systems    Constitutional:  Negative for fever and weight loss.   HENT:  Negative for congestion, hearing loss and sore throat.    Eyes:  Negative for blurred vision.   Respiratory:  Negative for cough and shortness of breath.    Cardiovascular:  Negative for chest pain, palpitations, claudication and leg swelling.   Gastrointestinal:  Negative for abdominal pain, constipation, diarrhea and heartburn.   Genitourinary:  Negative for dysuria.   Musculoskeletal:  Negative for back pain and myalgias.   Skin:  Negative for rash.   Neurological:  Negative for focal weakness and headaches.   Psychiatric/Behavioral:  Negative for depression and suicidal ideas. The patient is not nervous/anxious.          Medications:  Outpatient Encounter Medications as of 1/13/2025   Medication Sig Dispense Refill    atorvastatin (LIPITOR) 10 MG tablet Take 10 mg by mouth once daily.      calcitRIOL (ROCALTROL) 0.25 MCG Cap Take 0.25 mcg by mouth 2 (two) times daily.      clindamycin-benzoyl peroxide gel Apply topically every morning.      cyanocobalamin 1,000 mcg/mL injection INJECT 1 ML (1,000 MCG TOTAL) INTO THE MUSCLE EVERY 30 DAYS. 3 mL 13    ethynodiol-ethinyl estradiol (KELNOR) 1-35 mg-mcg per tablet Take 1 tablet by mouth once daily. 90 tablet 3    etodolac (LODINE XL) 400 MG 24 hr tablet TAKE 1 TABLET BY MOUTH EVERY DAY 90 tablet 0    levothyroxine (SYNTHROID) 75 MCG tablet Take 75 mcg by mouth before breakfast.      lifitegrast (XIIDRA) 5 % Dpet Place 1 drop into both eyes 2 (two) times daily. 60 each 11    magnesium oxide (MAG-OX) 400 mg tablet Take 400 mg by mouth once daily.      methen-m.blue-s.phos-phsal-hyo (URIBEL) 118-10-40.8-36 mg Cap Take 1 capsule by mouth 4 (four) times daily as needed.      ondansetron (ZOFRAN-ODT) 4 MG TbDL Take 1 tablet (4 mg total) by mouth every 8 (eight) hours as needed. 10 tablet 0    perfluorohexyloctane, PF, (MIEBO, PF,) 100 % Drop Place 1 drop into both eyes 4 (four) times daily. 3 mL 11     phenazopyridine (PYRIDIUM) 200 MG tablet Take 200 mg by mouth 3 (three) times daily.      semaglutide (OZEMPIC) 1 mg/dose (4 mg/3 mL)       spironolactone (ALDACTONE) 100 MG tablet Take 1 tablet (100 mg total) by mouth once daily. 30 tablet 6    traZODone (DESYREL) 100 MG tablet Take 1 tablet (100 mg total) by mouth every evening. 30 tablet 0    tretinoin (RETIN-A) 0.025 % cream SMARTSIG:Sparingly Topical Every Night      VEOZAH 45 mg Tab Take 1 tablet by mouth once daily.      [DISCONTINUED] amoxicillin-clavulanate 875-125mg (AUGMENTIN) 875-125 mg per tablet Take 1 tablet by mouth 2 (two) times daily. (Patient not taking: Reported on 1/13/2025)      [DISCONTINUED] fluconazole (DIFLUCAN) 100 MG tablet PLEASE SEE ATTACHED FOR DETAILED DIRECTIONS (Patient not taking: Reported on 1/13/2025)      [DISCONTINUED] levoFLOXacin (LEVAQUIN) 500 MG tablet Take 500 mg by mouth. (Patient not taking: Reported on 1/13/2025)      [DISCONTINUED] methylPREDNISolone (MEDROL DOSEPACK) 4 mg tablet TAKE AS DIRECTED (Patient not taking: Reported on 1/13/2025)       No facility-administered encounter medications on file as of 1/13/2025.        Allergies:  Review of patient's allergies indicates:   Allergen Reactions    Bactrim [sulfamethoxazole-trimethoprim] Hives         Physical Exam         Vitals:    01/13/25 1029   BP: (!) 140/80   Pulse: 76   Resp: 18         Physical Exam  Constitutional:       Appearance: She is well-developed.   Eyes:      Pupils: Pupils are equal, round, and reactive to light.   Neck:      Thyroid: No thyromegaly.   Cardiovascular:      Rate and Rhythm: Normal rate.      Heart sounds: Normal heart sounds. No murmur heard.     No friction rub. No gallop.   Pulmonary:      Breath sounds: Normal breath sounds.   Abdominal:      General: Bowel sounds are normal.      Palpations: Abdomen is soft.   Musculoskeletal:         General: Normal range of motion.      Cervical back: Normal range of motion.   Lymphadenopathy:  "     Cervical: No cervical adenopathy.   Skin:     General: Skin is warm.      Findings: No rash.   Neurological:      Mental Status: She is alert and oriented to person, place, and time.      Cranial Nerves: No cranial nerve deficit.   Psychiatric:         Behavior: Behavior normal.          Laboratory:  CBC:  Recent Labs   Lab Result Units 11/22/24  1007 11/28/24  0211   WBC K/uL 12.8* 13.84*   RBC M/uL 4.83 4.66   Hemoglobin g/dL 15.0 14.2   Hematocrit % 45.1* 42.3   Platelets K/uL 263 238   MCV fL 93.4 91   MCH pg 31.1 30.5   MCHC g/dL 33.3 33.6     CMP:  Recent Labs   Lab Result Units 11/27/24  0914 11/28/24  0211 12/02/24  1322 01/02/25  1507 01/02/25  1510 01/10/25  1306   Glucose mg/dL 75 103  --  81  --   --    Calcium mg/dL 8.4* 9.3   < > 9.0   < > 9.0   Albumin g/dL  --  4.1  --   --   --   --    Albumin Level g/dL 4.4  --    < > 4.5   < > 4.6   Total Protein g/dL  --  7.6  --   --   --   --    Sodium mmol/L 139 140  --  140  --   --    Potassium mmol/L 4.1 4.5  --  4.3  --   --    CO2 mmol/L  --  27  --   --   --   --    Carbon Dioxide mmol/L 28  --   --  30  --   --    Chloride mmol/L  --  105  --   --   --   --    BUN mg/dL  --  14  --   --   --   --    Blood Urea Nitrogen mg/dL 16  --   --  14  --   --    Alkaline Phosphatase U/L 99 105  --  97  --   --    ALT U/L 12 16  --  13  --   --    AST U/L 9* 14  --  13  --   --    Total Bilirubin mg/dL 0.4 0.4  --  0.4  --   --     < > = values in this interval not displayed.     URINALYSIS:  Recent Labs   Lab Result Units 11/28/24  0347   Color, UA  Colorless*   Specific Gravity, UA  1.010   pH, UA  7.0   Protein, UA  Negative   Nitrite, UA  Negative   Leukocytes, UA  Negative      LIPIDS:  No results for input(s): "TSH", "HDL", "CHOL", "TRIG", "LDLCALC", "CHOLHDL", "NONHDLCHOL", "TOTALCHOLEST" in the last 2160 hours.  TSH:  No results for input(s): "TSH" in the last 2160 hours.  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 Last lipid was 1/2/25 reviewed today          Insomnia    Overview     Trazodone 100 mg po qhs prn insomnia             As above, continue current medications and maintain follow up with specialists.  Return to clinic in 6 months.      Frederick W Dantagnan [facility]

## 2025-01-14 RX ORDER — CYANOCOBALAMIN 1000 UG/ML
1000 INJECTION, SOLUTION INTRAMUSCULAR; SUBCUTANEOUS
Qty: 3 ML | Refills: 13 | Status: SHIPPED | OUTPATIENT
Start: 2025-01-14

## 2025-02-18 ENCOUNTER — OFFICE VISIT (OUTPATIENT)
Dept: OPTOMETRY | Facility: CLINIC | Age: 55
End: 2025-02-18
Payer: COMMERCIAL

## 2025-02-18 DIAGNOSIS — H02.883 MEIBOMIAN GLAND DYSFUNCTION (MGD) OF BOTH EYES: ICD-10-CM

## 2025-02-18 DIAGNOSIS — H02.886 MEIBOMIAN GLAND DYSFUNCTION (MGD) OF BOTH EYES: ICD-10-CM

## 2025-02-18 DIAGNOSIS — H16.223 KERATOCONJUNCTIVITIS SICCA, NOT SPECIFIED AS SJOGREN'S, BILATERAL: ICD-10-CM

## 2025-02-18 RX ORDER — PERFLUOROHEXYLOCTANE 1 MG/MG
1 SOLUTION OPHTHALMIC 4 TIMES DAILY
Qty: 3 ML | Refills: 11 | Status: SHIPPED | OUTPATIENT
Start: 2025-02-18 | End: 2026-02-18

## 2025-02-20 ENCOUNTER — PATIENT MESSAGE (OUTPATIENT)
Dept: OPTOMETRY | Facility: CLINIC | Age: 55
End: 2025-02-20
Payer: COMMERCIAL

## 2025-02-20 NOTE — PROGRESS NOTES
HPI     Dry Eye     Additional comments: Dry eye chk           Comments    DLS: 1/5/2025    Pt states she has noticed improvement in her dry eye symptoms. Her eyes   are feeling much better.    Keratoconjunctivitis sicca, not specified as Sjogren's, bilateral  Meibomian gland dysfunction (MGD) of both eyes    Gtts:  Xiidra BID OU  Miebo BID OU            Last edited by Elizabeth Solorio, OD on 2/18/2025  3:26 PM.            Assessment /Plan     For exam results, see Encounter Report.    Keratoconjunctivitis sicca, not specified as Sjogren's, bilateral  -     perfluorohexyloctane, PF, (MIEBO, PF,) 100 % Drop; Place 1 drop into both eyes 4 (four) times daily.  Dispense: 3 mL; Refill: 11    Meibomian gland dysfunction (MGD) of both eyes  -     perfluorohexyloctane, PF, (MIEBO, PF,) 100 % Drop; Place 1 drop into both eyes 4 (four) times daily.  Dispense: 3 mL; Refill: 11      DRY EYE FOLLOW-UP    JULITA: 01/05/2025  Current Treatment: xiidra 1 gtt OU BID + miebo 1 gtt OU BID  Current Symptoms: None  Failed Treatment: None    Great improvement in dry eye eyes with xiidra and miebo. Pt to continue as directed. Additional ATs prn.   Pt moving to the [place] area. Will give rec of other eye docs in the area.     Visit today included increased complexity associated with the care of the pt's ocular surface disease and managing the longitudinal care of the patient due to the serious and/or complex managed problem.       RTC with me prn, with new eye doc for next annual (01/2026) or sooner if needed.                      Verbal/written post procedure instructions were given to patient/caregiver/Instructed patient/caregiver to follow-up with primary care physician/Care for catheter as per unit/ICU protocols

## 2025-04-03 ENCOUNTER — HOSPITAL ENCOUNTER (EMERGENCY)
Facility: HOSPITAL | Age: 55
Discharge: HOME | End: 2025-04-03
Attending: EMERGENCY MEDICINE
Payer: COMMERCIAL

## 2025-04-03 ENCOUNTER — TELEPHONE (OUTPATIENT)
Dept: SURGERY | Facility: CLINIC | Age: 55
End: 2025-04-03
Payer: COMMERCIAL

## 2025-04-03 VITALS
BODY MASS INDEX: 27.75 KG/M2 | HEIGHT: 69 IN | WEIGHT: 187.38 LBS | TEMPERATURE: 98 F | DIASTOLIC BLOOD PRESSURE: 57 MMHG | RESPIRATION RATE: 16 BRPM | OXYGEN SATURATION: 96 % | HEART RATE: 75 BPM | SYSTOLIC BLOOD PRESSURE: 104 MMHG

## 2025-04-03 DIAGNOSIS — K80.80 BILIARY CALCULUS OF OTHER SITE WITHOUT OBSTRUCTION: Primary | ICD-10-CM

## 2025-04-03 DIAGNOSIS — R79.89 ELEVATED LFTS: ICD-10-CM

## 2025-04-03 DIAGNOSIS — R07.9 CHEST PAIN: ICD-10-CM

## 2025-04-03 LAB
ABSOLUTE EOSINOPHIL (OHS): 0.07 K/UL
ABSOLUTE MONOCYTE (OHS): 0.68 K/UL (ref 0.3–1)
ABSOLUTE NEUTROPHIL COUNT (OHS): 7.06 K/UL (ref 1.8–7.7)
ALBUMIN SERPL BCP-MCNC: 3.9 G/DL (ref 3.5–5.2)
ALP SERPL-CCNC: 125 UNIT/L (ref 40–150)
ALT SERPL W/O P-5'-P-CCNC: 60 UNIT/L (ref 10–44)
ANION GAP (OHS): 8 MMOL/L (ref 8–16)
AST SERPL-CCNC: 104 UNIT/L (ref 11–45)
BASOPHILS # BLD AUTO: 0.03 K/UL
BASOPHILS NFR BLD AUTO: 0.3 %
BILIRUB SERPL-MCNC: 0.7 MG/DL (ref 0.1–1)
BILIRUB UR QL STRIP.AUTO: NEGATIVE
BNP SERPL-MCNC: <10 PG/ML (ref 0–99)
BUN SERPL-MCNC: 10 MG/DL (ref 6–20)
CALCIUM SERPL-MCNC: 8.8 MG/DL (ref 8.7–10.5)
CHLORIDE SERPL-SCNC: 107 MMOL/L (ref 95–110)
CLARITY UR: CLEAR
CO2 SERPL-SCNC: 23 MMOL/L (ref 23–29)
COLOR UR AUTO: YELLOW
CREAT SERPL-MCNC: 0.9 MG/DL (ref 0.5–1.4)
ERYTHROCYTE [DISTWIDTH] IN BLOOD BY AUTOMATED COUNT: 12.6 % (ref 11.5–14.5)
GFR SERPLBLD CREATININE-BSD FMLA CKD-EPI: >60 ML/MIN/1.73/M2
GLUCOSE SERPL-MCNC: 95 MG/DL (ref 70–110)
GLUCOSE UR QL STRIP: NEGATIVE
HCT VFR BLD AUTO: 40.1 % (ref 37–48.5)
HGB BLD-MCNC: 13.6 GM/DL (ref 12–16)
HGB UR QL STRIP: NEGATIVE
IMM GRANULOCYTES # BLD AUTO: 0.05 K/UL (ref 0–0.04)
IMM GRANULOCYTES NFR BLD AUTO: 0.5 % (ref 0–0.5)
KETONES UR QL STRIP: ABNORMAL
LEUKOCYTE ESTERASE UR QL STRIP: NEGATIVE
LIPASE SERPL-CCNC: 25 U/L (ref 4–60)
LYMPHOCYTES # BLD AUTO: 2.44 K/UL (ref 1–4.8)
MCH RBC QN AUTO: 30.4 PG (ref 27–31)
MCHC RBC AUTO-ENTMCNC: 33.9 G/DL (ref 32–36)
MCV RBC AUTO: 90 FL (ref 82–98)
NITRITE UR QL STRIP: NEGATIVE
NUCLEATED RBC (/100WBC) (OHS): 0 /100 WBC
OHS QRS DURATION: 80 MS
OHS QTC CALCULATION: 437 MS
PH UR STRIP: 8 [PH]
PLATELET # BLD AUTO: 207 K/UL (ref 150–450)
PMV BLD AUTO: 9.6 FL (ref 9.2–12.9)
POTASSIUM SERPL-SCNC: 3.6 MMOL/L (ref 3.5–5.1)
PROT SERPL-MCNC: 7.2 GM/DL (ref 6–8.4)
PROT UR QL STRIP: NEGATIVE
RBC # BLD AUTO: 4.48 M/UL (ref 4–5.4)
RELATIVE EOSINOPHIL (OHS): 0.7 %
RELATIVE LYMPHOCYTE (OHS): 23.6 % (ref 18–48)
RELATIVE MONOCYTE (OHS): 6.6 % (ref 4–15)
RELATIVE NEUTROPHIL (OHS): 68.3 % (ref 38–73)
SODIUM SERPL-SCNC: 138 MMOL/L (ref 136–145)
SP GR UR STRIP: 1.01
TROPONIN I SERPL HS-MCNC: <3 NG/L
UROBILINOGEN UR STRIP-ACNC: NEGATIVE EU/DL
WBC # BLD AUTO: 10.33 K/UL (ref 3.9–12.7)

## 2025-04-03 PROCEDURE — 83880 ASSAY OF NATRIURETIC PEPTIDE: CPT

## 2025-04-03 PROCEDURE — 96374 THER/PROPH/DIAG INJ IV PUSH: CPT

## 2025-04-03 PROCEDURE — 96376 TX/PRO/DX INJ SAME DRUG ADON: CPT

## 2025-04-03 PROCEDURE — 81003 URINALYSIS AUTO W/O SCOPE: CPT

## 2025-04-03 PROCEDURE — 93010 ELECTROCARDIOGRAM REPORT: CPT | Mod: ,,, | Performed by: INTERNAL MEDICINE

## 2025-04-03 PROCEDURE — 93005 ELECTROCARDIOGRAM TRACING: CPT

## 2025-04-03 PROCEDURE — 84484 ASSAY OF TROPONIN QUANT: CPT

## 2025-04-03 PROCEDURE — 63600175 PHARM REV CODE 636 W HCPCS

## 2025-04-03 PROCEDURE — 85025 COMPLETE CBC W/AUTO DIFF WBC: CPT

## 2025-04-03 PROCEDURE — 83690 ASSAY OF LIPASE: CPT

## 2025-04-03 PROCEDURE — 99285 EMERGENCY DEPT VISIT HI MDM: CPT | Mod: 25

## 2025-04-03 PROCEDURE — 25500020 PHARM REV CODE 255: Performed by: EMERGENCY MEDICINE

## 2025-04-03 PROCEDURE — 80053 COMPREHEN METABOLIC PANEL: CPT

## 2025-04-03 RX ORDER — MORPHINE SULFATE 4 MG/ML
4 INJECTION, SOLUTION INTRAMUSCULAR; INTRAVENOUS
Refills: 0 | Status: COMPLETED | OUTPATIENT
Start: 2025-04-03 | End: 2025-04-03

## 2025-04-03 RX ADMIN — MORPHINE SULFATE 4 MG: 4 INJECTION INTRAVENOUS at 02:04

## 2025-04-03 RX ADMIN — MORPHINE SULFATE 4 MG: 4 INJECTION INTRAVENOUS at 06:04

## 2025-04-03 RX ADMIN — IOHEXOL 100 ML: 350 INJECTION, SOLUTION INTRAVENOUS at 04:04

## 2025-04-03 NOTE — Clinical Note
"Kristina Ayoubmarques Graham was seen and treated in our emergency department on 4/3/2025.  She may return to work on 04/05/2025.       If you have any questions or concerns, please don't hesitate to call.      Arleth Fernandez MD"

## 2025-04-03 NOTE — DISCHARGE INSTRUCTIONS
Diagnosis: Abdominal pain    Tests you had showed:   Labs Reviewed   COMPREHENSIVE METABOLIC PANEL - Abnormal       Result Value    Sodium 138      Potassium 3.6      Chloride 107      CO2 23      Glucose 95      BUN 10      Creatinine 0.9      Calcium 8.8      Protein Total 7.2      Albumin 3.9      Bilirubin Total 0.7             (*)     ALT 60 (*)     Anion Gap 8      eGFR >60     CBC WITH DIFFERENTIAL - Abnormal    WBC 10.33      RBC 4.48      HGB 13.6      HCT 40.1      MCV 90      MCH 30.4      MCHC 33.9      RDW 12.6      Platelet Count 207      MPV 9.6      Nucleated RBC 0      Neut % 68.3      Lymph % 23.6      Mono % 6.6      Eos % 0.7      Basophil % 0.3      Imm Grans % 0.5      Neut # 7.06      Lymph # 2.44      Mono # 0.68      Eos # 0.07      Baso # 0.03      Imm Grans # 0.05 (*)    URINALYSIS, REFLEX TO URINE CULTURE - Abnormal    Color, UA Yellow      Appearance, UA Clear      pH, UA 8.0      Spec Grav UA 1.010      Protein, UA Negative      Glucose, UA Negative      Ketones, UA 1+ (*)     Bilirubin, UA Negative      Blood, UA Negative      Nitrites, UA Negative      Urobilinogen, UA Negative      Leukocyte Esterase, UA Negative     TROPONIN I HIGH SENSITIVITY - Normal    Troponin High Sensitive <3      Narrative:     .0   TROPONIN I HIGH SENSITIVITY - Normal    Troponin High Sensitive <3     B-TYPE NATRIURETIC PEPTIDE - Normal    BNP <10     LIPASE - Normal    Lipase Level 25     CBC W/ AUTO DIFFERENTIAL    Narrative:     The following orders were created for panel order CBC auto differential.  Procedure                               Abnormality         Status                     ---------                               -----------         ------                     CBC with Differential[[id number]]       Abnormal            Final result                 Please view results for these tests on the individual orders.   TROPONIN I HIGH SENSITIVITY      CTA Chest Abdomen Pelvis (XPD)    Preliminary Result      No acute findings in the chest, abdomen or pelvis.  Specifically, no    aortic aneurysm, dissection or acute aortic syndrome.      Cholelithiasis.  No evidence of cholecystitis.      Trace free fluid in the pelvis.      Electronically signed by resident: John Armijo   Date:    04/03/2025   Time:    04:49         X-Ray Chest AP Portable   Preliminary Result      No acute cardiopulmonary process.      Electronically signed by resident: John Armijo   Date:    04/03/2025   Time:    03:59             Treatments you received were:   Medications   morphine injection 4 mg (has no administration in time range)   morphine injection 4 mg (4 mg Intravenous Given 4/3/25 0211)   iohexoL (OMNIPAQUE 350) injection 100 mL (100 mLs Intravenous Given 4/3/25 0432)       Home Care Instructions:  - Medications: Continue taking your home medications as prescribed    Follow-Up Plan:  - Follow-up with: Primary care doctor within 3 - 5  days  - Additional testing and/or evaluation will be directed by your primary doctor    Return to the Emergency Department for symptoms including but not limited to: worsening symptoms, severe back pain, shortness of breath or chest pain, vomiting with inability to hold down fluids, blood in vomit or poop, fevers greater than 100.4°F, passing out/fainting/unconsciousness, or other concerning symptoms.

## 2025-04-03 NOTE — PROVIDER PROGRESS NOTES - EMERGENCY DEPT.
"Encounter Date: 4/3/2025    ED Physician Progress Notes          ED Resident HAND-OFF NOTE:  7:12 AM 4/3/2025  Kristina Graham is a 54 y.o. female who presented to the ED on 4/3/2025 and has been managed by Dr. Schroeder. I assumed care of patient from off-going ED physician team at 7:12 AM pending CTA.    CP with nausea, back pain radiation.  54 F with cholelithiasis with mild LFT elevation.  Given second dose of pain medication.  Thyroid recently out, patient is concerned about her thyroid, labs reassuring.     On re-evaluation, her abdominal pain is improved, she has no evidence of acute dissection, and is amenable to discharge with plan for follow up.  General surgery referral order placed, and she was discharged in stable condition    On my evaluation, Kristina Graham appears well, hemodynamically stable and in NAD. Thus far, Kristina Graham has received:  Medications   morphine injection 4 mg (4 mg Intravenous Given 4/3/25 0211)   iohexoL (OMNIPAQUE 350) injection 100 mL (100 mLs Intravenous Given 4/3/25 0432)   morphine injection 4 mg (4 mg Intravenous Given 4/3/25 0623)       On my exam, I appreciate:  BP (!) 104/57 (BP Location: Right arm, Patient Position: Lying)   Pulse 75   Temp 98.4 °F (36.9 °C) (Oral)   Resp 16   Ht 5' 9" (1.753 m)   Wt 85 kg (187 lb 6.3 oz)   LMP  (LMP Unknown)   SpO2 96%   Breastfeeding No   BMI 27.67 kg/m²   ED Course as of 04/03/25 1722   Thu Apr 03, 2025   0333 X-Ray Chest AP Portable  Interpretation:  No pneumonia, pulmonary edema. [ES]   0340 Urinalysis, Reflex to Urine Culture(!)  Low suspicion for kidney stones [ES]   0424 Troponin I High Sensitivity: <3 [ES]   0424 BNP: <10 [ES]   0424 AST(!): 104 [ES]   0424 ALT(!): 60 [ES]   0440 CBC auto differential(!)  No leukocytosis or anemia [ES]   0440 Calcium: 8.8 [ES]   0501 Lipase: 25 [ES]   0521 CTA Chest Abdomen Pelvis (XPD)  No acute findings in the chest, abdomen or pelvis.  Specifically, no " aortic aneurysm, dissection or acute aortic syndrome.     Cholelithiasis.  No evidence of cholecystitis.     Trace free fluid in the pelvis.   [ES]   0622 Troponin I High Sensitivity: <3 [ES]   0727 X-Ray Chest AP Portable [KB]   0727 CTA Chest Abdomen Pelvis (XPD)     FINDINGS:  Trachea and mediastinal structures are midline.  Cardiopericardial silhouette is normal in size.  Hilar contours are normal.     The lungs are symmetrically expanded.  No focal consolidation, pleural effusion or pneumothorax.     No subdiaphragmatic free air. Visualized osseous structures are intact.  Soft tissues are within normal limits.     Impression:     No acute cardiopulmonary process.   [KB]      ED Course User Index  [ES] Arleth Fernandez MD  [KB] Chuyita Stevenson MD       Disposition:DC  I have discussed and counseled Kristina Graham regarding exam, results, diagnosis, treatment, and plan.  ______________________  Chuyita Stevenson DO  Emergency Medicine Resident  7:12 AM 4/3/2025

## 2025-04-03 NOTE — ED NOTES
Pt is AA&O times four Resp full and reg Radial pulses strong and reg Abd soft to touch Bowel sounds audible to all four quad on auscultation

## 2025-04-03 NOTE — ED PROVIDER NOTES
Encounter Date: 4/3/2025       History     Chief Complaint   Patient presents with    Chest Pain     Presents to the ED with chest pain and SOB, reports calcium deficiency, takes new medication      HPI    Kristina Graham is a 54 y.o. female with PMH of hypocalcemia, presenting to [facility] ED for chest pain.  Patient states that she woke up from sleep with midsternal chest pain that radiates to her back (to the left flank).  Describes it as squeezing in nature.  Describes it as 10/10 in severity states that she feels like this could be consistent with bone pain that she has had associated with hypocalcemia in the past but it is more severe and in a different location.  Patient also endorses associated shortness of breath.  Any recent infectious symptoms such as fever, chills, rhinorrhea/congestion.  She endorses nausea on the way to the hospital but no vomiting.  States that she was transiently diaphoretic.      Review of patient's allergies indicates:   Allergen Reactions    Bactrim [sulfamethoxazole-trimethoprim] Hives     Past Medical History:   Diagnosis Date    Annual physical exam 2024    Arthritis     Mixed hyperlipidemia 2022    Nontoxic multinodular goiter 2017    Rheumatoid arthritis(714.0)      Past Surgical History:   Procedure Laterality Date     SECTION, LOW TRANSVERSE      x2    REPAIR OF TENDON OF LOWER EXTREMITY Right 2023    Procedure: REPAIR, TENDON, LOWER EXTREMITY;  Surgeon: Jessee Reyna) MD Eugenio;  Location: [facility];  Service: Orthopedics;  Laterality: Right;    THYROIDECTOMY       Family History   Problem Relation Name Age of Onset    Cancer Mother Kristina Graham     Diabetes Mother Kristina Graham     Hypertension Mother Kristina Graham     Cataracts Mother Kristina Graham     Glaucoma Mother Kristina Graham     Arthritis Mother Kristina Graham     Asthma Mother Kristina Graham     Cancer Father Kristina Graham     Hypertension Father Kristina  Faucheux     Heart disease Father Kristina Faucheux     Heart disease Paternal Grandfather Kristina F[place]heux     Hypertension Paternal Grandfather Kristina F[place]heux     Hypertension Sister Kristina F[place]heux     Cancer Maternal Aunt Kristina F[place]heux     Cancer Paternal Uncle Kristina F[place]heux     Hypertension Paternal Uncle Kristina F[place]heux     Cancer Maternal Grandmother Kristina F[place]heux     Diabetes Maternal Grandmother Kristina Faucheux     Hypertension Maternal Grandmother Kristina F[place]heux     Glaucoma Maternal Grandmother Kristina F[place]heux     Heart disease Paternal Grandmother Kristina F[place]heux     Hypertension Paternal Grandmother Kristina F[place]heux     Glaucoma Maternal Grandfather      Amblyopia Neg Hx      Blindness Neg Hx      Macular degeneration Neg Hx      Retinal detachment Neg Hx      Strabismus Neg Hx       Social History[1]  Review of Systems   Constitutional:  Positive for diaphoresis. Negative for fever.   HENT:  Negative for congestion, rhinorrhea and sore throat.    Eyes:  Negative for visual disturbance.   Respiratory:  Positive for shortness of breath. Negative for cough.    Cardiovascular:  Positive for chest pain. Negative for leg swelling.   Gastrointestinal:  Positive for nausea. Negative for abdominal pain, diarrhea and vomiting.   Genitourinary:  Negative for dysuria and hematuria.   Skin:  Negative for rash.   Neurological:  Negative for weakness.       Physical Exam     Initial Vitals [04/03/25 0128]   BP Pulse Resp Temp SpO2   135/86 90 16 97.8 °F (36.6 °C) 100 %      MAP       --         Physical Exam    Nursing note and vitals reviewed.  Constitutional: She is cooperative.  Non-toxic appearance. She does not appear ill. She appears distressed.   HENT:   Head: Normocephalic and atraumatic. Mouth/Throat: Mucous membranes are normal. Mucous membranes are not dry.   Eyes: Conjunctivae are normal.   Neck: Phonation normal.   Cardiovascular:  Normal rate, regular rhythm and normal heart sounds.      Exam reveals no gallop, no S3, no S4 and no friction rub.       No murmur heard.  Pulmonary/Chest: Breath sounds normal. No respiratory distress. She has no wheezes. She has no rhonchi. She has no rales.   Abdominal: Abdomen is soft. She exhibits no distension. There is no abdominal tenderness.   Musculoskeletal:      Right lower leg: No edema.      Left lower leg: No edema.     Neurological: She is alert.   Skin: Skin is warm, dry and intact. Capillary refill takes less than 2 seconds.   Psychiatric: Her speech is normal.         ED Course   Procedures  Labs Reviewed   COMPREHENSIVE METABOLIC PANEL - Abnormal       Result Value    Sodium 138      Potassium 3.6      Chloride 107      CO2 23      Glucose 95      BUN 10      Creatinine 0.9      Calcium 8.8      Protein Total 7.2      Albumin 3.9      Bilirubin Total 0.7             (*)     ALT 60 (*)     Anion Gap 8      eGFR >60     CBC WITH DIFFERENTIAL - Abnormal    WBC 10.33      RBC 4.48      HGB 13.6      HCT 40.1      MCV 90      MCH 30.4      MCHC 33.9      RDW 12.6      Platelet Count 207      MPV 9.6      Nucleated RBC 0      Neut % 68.3      Lymph % 23.6      Mono % 6.6      Eos % 0.7      Basophil % 0.3      Imm Grans % 0.5      Neut # 7.06      Lymph # 2.44      Mono # 0.68      Eos # 0.07      Baso # 0.03      Imm Grans # 0.05 (*)    URINALYSIS, REFLEX TO URINE CULTURE - Abnormal    Color, UA Yellow      Appearance, UA Clear      pH, UA 8.0      Spec Grav UA 1.010      Protein, UA Negative      Glucose, UA Negative      Ketones, UA 1+ (*)     Bilirubin, UA Negative      Blood, UA Negative      Nitrites, UA Negative      Urobilinogen, UA Negative      Leukocyte Esterase, UA Negative     TROPONIN I HIGH SENSITIVITY - Normal    Troponin High Sensitive <3      Narrative:     .0   TROPONIN I HIGH SENSITIVITY - Normal    Troponin High Sensitive <3     B-TYPE NATRIURETIC PEPTIDE - Normal    BNP <10     LIPASE - Normal    Lipase Level 25      TROPONIN I HIGH SENSITIVITY - Normal    Troponin High Sensitive <3     CBC W/ AUTO DIFFERENTIAL    Narrative:     The following orders were created for panel order CBC auto differential.  Procedure                               Abnormality         Status                     ---------                               -----------         ------                     CBC with Differential[[id number]]       Abnormal            Final result                 Please view results for these tests on the individual orders.     EKG Readings: (Independently Interpreted)   Initial Reading: No STEMI. Rhythm: Normal Sinus Rhythm. Heart Rate: 85. ST Segments: Normal ST Segments. T Waves Flipped: V1 and V2. Clinical Impression: Normal Sinus Rhythm       Imaging Results              CTA Chest Abdomen Pelvis (XPD) (Preliminary result)  Result time 04/03/25 06:05:50      Preliminary result by John Armijo MD (04/03/25 06:05:50)                   Initial Result:    EXAMINATION:  CTA CHEST ABDOMEN PELVIS (XPD)    CLINICAL HISTORY:  Aortic dissection suspected    TECHNIQUE:  Low dose axial images, sagittal and coronal reformations were obtained   from the thoracic inlet to the pubic symphysis before and following the IV   administration of 100 mL of Omnipaque 350 intravenous contrast per the   aortic dissection protocol.  Oral contrast was not administered.    COMPARISON:  CT renal stone study 10/01/2024    FINDINGS:  Vasculature: Left-sided 3 vessel arch without significant atherosclerosis   or focal narrowing at the great vessel origins.  Thoracoabdominal aorta is   normal caliber with minimal scattered atherosclerosis.  No intramural   hematoma, penetrating atherosclerotic ulcer or dissection.  Main pulmonary   artery is normal in size without central filling defect.  The celiac   trunk, SMA, AUDREY and bilateral renal arteries are patent.  Portal   vasculature not well evaluated due to contrast bolus timing however   appears grossly  patent.  The iliac and visualized femoral vessels are   patent without focal stenosis or other abnormality.    Neck and thoracic soft tissues: No significant abnormality.    Heart: Normal size. No significant atherosclerosis of the coronary   arteries. No pericardial effusion.    Airways/Lungs/Pleura: Central airways are clear.  No focal consolidation,   pleural effusion or pneumothorax.    Hilum/Mediastinum: No hilar or mediastinal lymph node enlargement.    Esophagus: No significant abnormality.    ABDOMEN/PELVIS:    Hepatobiliary: Liver is normal size. No focal abnormality. Few gas   containing gallstones.  No pericholecystic inflammatory changes. No   intrahepatic or extrahepatic biliary ductal dilatation.    Spleen: Normal size.    Pancreas: No mass. No peripancreatic fat stranding.    Adrenals: No significant abnormality    Renal/Ureters: The kidneys are normal in size, location and enhancement.    No stones or hydroureteronephrosis.  The urinary bladder is unremarkable.    Reproductive: Uterus is without significant abnormality. No adnexal mass.    Stomach/Bowel: Stomach is unremarkable.  Bowel is normal caliber without   obstruction or inflammation. Normal appendix.    Peritoneum: Trace free fluid in the pelvis.  No intraperitoneal free air.    Lymph Nodes: No pathologic rafael enlargement in the abdomen or pelvis.    Bones: No acute fractures or osseous destructive lesions.    Soft Tissues: No significant abnormality.    Impression:    No acute findings in the chest, abdomen or pelvis.  Specifically, no   aortic aneurysm, dissection or acute aortic syndrome.    Cholelithiasis.  No evidence of cholecystitis.    Trace free fluid in the pelvis.    Electronically signed by resident: John Armijo  Date:    04/03/2025  Time:    04:49                        Preliminary result by John Armijo MD (04/03/25 05:11:02)                   Initial Result:    EXAMINATION:  CTA CHEST ABDOMEN PELVIS (XPD)    CLINICAL  HISTORY:  Aortic dissection suspected;    TECHNIQUE:  Low dose axial images, sagittal and coronal reformations were obtained   from the thoracic inlet to the pubic symphysis before and following the IV   administration of 100 mL of Omnipaque 350 intravenous contrast per the   aortic dissection protocol.  Oral contrast was not administered.    COMPARISON:  CT renal stone study 10/01/2024    FINDINGS:  Vasculature: Left-sided 3 vessel arch without significant atherosclerosis   or focal narrowing at the great vessel origins.  Thoracoabdominal aorta is   normal caliber with minimal scattered atherosclerosis.  No intramural   hematoma, penetrating atherosclerotic ulcer or dissection.  Main pulmonary   artery is normal in size without central filling defect.  The celiac   trunk, SMA, AUDREY and bilateral renal arteries are patent.  Portal   vasculature not well evaluated due to contrast bolus timing however   appears grossly patent.  The iliac and visualized femoral vessels are   patent without focal stenosis or other abnormality.    Neck and thoracic soft tissues: No significant abnormality.    Heart: Normal size. No significant atherosclerosis of the coronary   arteries. No pericardial effusion.    Airways/Lungs/Pleura: Central airways are clear.  No focal consolidation,   pleural effusion or pneumothorax.    Hilum/Mediastinum: No hilar or mediastinal lymph node enlargement.    Esophagus: No significant abnormality.    ABDOMEN/PELVIS:    Hepatobiliary: Liver is normal size. No focal abnormality. Few gas   containing gallstones.  No pericholecystic inflammatory changes. No   intrahepatic or extrahepatic biliary ductal dilatation.    Spleen: Normal size.    Pancreas: No mass. No peripancreatic fat stranding.    Adrenals: No significant abnormality    Renal/Ureters: The kidneys are normal in size, location and enhancement.    No stones or hydroureteronephrosis.  The urinary bladder is unremarkable.    Reproductive: Uterus is  without significant abnormality. No adnexal mass.    Stomach/Bowel: Stomach is unremarkable.  Bowel is normal caliber without   obstruction or inflammation. Normal appendix.    Peritoneum: Trace free fluid in the pelvis.  No intraperitoneal free air.    Lymph Nodes: No pathologic rafael enlargement in the abdomen or pelvis.    Bones: No acute fractures or osseous destructive lesions.    Soft Tissues: No significant abnormality.    Impression:    No acute findings in the chest, abdomen or pelvis.  Specifically, no   aortic aneurysm, dissection or acute aortic syndrome.    Cholelithiasis.  No evidence of cholecystitis.    Trace free fluid in the pelvis.    Electronically signed by resident: John Armijo  Date:    04/03/2025  Time:    04:49                                       X-Ray Chest AP Portable (Preliminary result)  Result time 04/03/25 04:00:42      Preliminary result by John Armijo MD (04/03/25 04:00:42)                   Initial Result:    EXAMINATION:  XR CHEST AP PORTABLE    CLINICAL HISTORY:  Chest Pain;    TECHNIQUE:  Single frontal view of the chest was performed.    COMPARISON:  Chest radiograph 04/04/2018    FINDINGS:  Trachea and mediastinal structures are midline.  Cardiopericardial   silhouette is normal in size.  Hilar contours are normal.    The lungs are symmetrically expanded.  No focal consolidation, pleural   effusion or pneumothorax.    No subdiaphragmatic free air. Visualized osseous structures are intact.    Soft tissues are within normal limits.    Impression:    No acute cardiopulmonary process.    Electronically signed by resident: John Armijo  Date:    04/03/2025  Time:    03:59                                       Medications   morphine injection 4 mg (4 mg Intravenous Given 4/3/25 0211)   iohexoL (OMNIPAQUE 350) injection 100 mL (100 mLs Intravenous Given 4/3/25 0432)   morphine injection 4 mg (4 mg Intravenous Given 4/3/25 0623)     Medical Decision Making  54-year-old  female with history of hypocalcemia presenting for chest pain.  Initially, patient is afebrile, hemodynamically stable but appears severely will secondary to pain.  Since patient is endorsing chest pain, diaphoresis, shortness of breath with chest pain radiating to her back, concerning for dissection, will obtain CTA.  Also considered since patient has a history of nephrolithiasis in the past.  Patient symptoms such as fever, dysuria/hematuria will assess for electrolyte disturbances will also look for CAD the patient has minimal risk factors.    Upon reassessment, patient endorses improvement in pain but it is still difficult to tolerate.  Will give some morphine.  Denies nausea at this time.  Workup so far is reassuring.  No RBCs on urinalysis, low suspicion for kidney stones.  No evidence of UTI to suggest pyelonephritis.  CTA does not show any evidence of aneurysm or dissection of the aorta.  Patient does have cholelithiasis which is unknown diagnosis.  No evidence of cholecystitis, additionally, patient does not have any leukocytosis to suggest cholecystitis.  EKG and troponin x2 reassuring, low suspicion for ACS.  No significant electrolyte disturbances.    Discussed patient's case with oncoming team, patient pending official radiology read of CTA, reassessment for adequate pain control.  If pain is controlled in workup is arch with close general surgery follow up for cholelithiasis.  If patient is uncontrolled, may require admission for observation and symptomatic management.    Amount and/or Complexity of Data Reviewed  Labs: ordered. Decision-making details documented in ED Course.  Radiology: ordered and independent interpretation performed. Decision-making details documented in ED Course.  ECG/medicine tests: ordered and independent interpretation performed.    Risk  Prescription drug management.               ED Course as of 04/03/25 0631   u Apr 03, 2025   0333 X-Ray Chest AP Portable  Interpretation:   No pneumonia, pulmonary edema. [ES]   0340 Urinalysis, Reflex to Urine Culture(!)  Low suspicion for kidney stones [ES]   0424 Troponin I High Sensitivity: <3 [ES]   0424 BNP: <10 [ES]   0424 AST(!): 104 [ES]   0424 ALT(!): 60 [ES]   0440 CBC auto differential(!)  No leukocytosis or anemia [ES]   0440 Calcium: 8.8 [ES]   0501 Lipase: 25 [ES]   0521 CTA Chest Abdomen Pelvis (XPD)  No acute findings in the chest, abdomen or pelvis.  Specifically, no aortic aneurysm, dissection or acute aortic syndrome.     Cholelithiasis.  No evidence of cholecystitis.     Trace free fluid in the pelvis.   [ES]   0622 Troponin I High Sensitivity: <3 [ES]      ED Course User Index  [ES] Arleth Fernandez MD                           Clinical Impression:  Final diagnoses:  [R07.9] Chest pain  [K80.80] Biliary calculus of other site without obstruction (Primary)  [R79.89] Elevated LFTs                   [1]   Social History  Tobacco Use    Smoking status: Never    Smokeless tobacco: Never    Tobacco comments:     drug rep   Substance Use Topics    Alcohol use: Not Currently     Comment: social    Drug use: Never        Arleth Fernandez MD  Resident  04/03/25 [id number]

## 2025-04-04 ENCOUNTER — OFFICE VISIT (OUTPATIENT)
Dept: PRIMARY CARE CLINIC | Facility: CLINIC | Age: 55
End: 2025-04-04
Payer: COMMERCIAL

## 2025-04-04 VITALS
WEIGHT: 187.63 LBS | BODY MASS INDEX: 27.79 KG/M2 | HEART RATE: 67 BPM | DIASTOLIC BLOOD PRESSURE: 70 MMHG | OXYGEN SATURATION: 98 % | HEIGHT: 69 IN | SYSTOLIC BLOOD PRESSURE: 110 MMHG

## 2025-04-04 DIAGNOSIS — G47.00 INSOMNIA, UNSPECIFIED TYPE: ICD-10-CM

## 2025-04-04 DIAGNOSIS — K80.20 CALCULUS OF GALLBLADDER WITHOUT CHOLECYSTITIS WITHOUT OBSTRUCTION: Primary | ICD-10-CM

## 2025-04-04 PROCEDURE — 99999 PR PBB SHADOW E&M-EST. PATIENT-LVL IV: CPT | Mod: PBBFAC,,, | Performed by: INTERNAL MEDICINE

## 2025-04-04 NOTE — PROGRESS NOTES
[facility] Note    Chief Complaint      Chief Complaint   Patient presents with    Follow-up       History of Present Illness      Kristina Graham is a 54 y.o. female who presents today for   Chief Complaint   Patient presents with    Follow-up   .  Patient comes to appointment here for 6m checkup for chronic issues as below as well as for jhops f/u for gallstones and severe pain . She has been scheduled for surgical consult .     Problem List Items Addressed This Visit       Insomnia    Overview   Trazodone 100 mg po qhs prn insomnia          Calculus of gallbladder without cholecystitis without obstruction - Primary    Overview   Er note reviewed                 Past Medical History:  Past Medical History:   Diagnosis Date    Annual physical exam 2024    Arthritis     Mixed hyperlipidemia 2022    Nontoxic multinodular goiter 2017    Rheumatoid arthritis(714.0)        Past Surgical History:  Past Surgical History:   Procedure Laterality Date     SECTION, LOW TRANSVERSE      x2    REPAIR OF TENDON OF LOWER EXTREMITY Right 2023    Procedure: REPAIR, TENDON, LOWER EXTREMITY;  Surgeon: Jessee Reyna) MD Eugenio;  Location: [facility];  Service: Orthopedics;  Laterality: Right;    THYROIDECTOMY         Family History:  family history includes Arthritis in her mother; Asthma in her mother; Cancer in her father, maternal aunt, maternal grandmother, mother, and paternal uncle; Cataracts in her mother; Diabetes in her maternal grandmother and mother; Glaucoma in her maternal grandfather, maternal grandmother, and mother; Heart disease in her father, paternal grandfather, and paternal grandmother; Hypertension in her father, maternal grandmother, mother, paternal grandfather, paternal grandmother, paternal uncle, and sister.    Social History:  Social History[1]    Review of Systems:   Review of Systems   Constitutional:  Negative for fever and weight loss.    HENT:  Negative for congestion, hearing loss and sore throat.    Eyes:  Negative for blurred vision.   Respiratory:  Negative for cough and shortness of breath.    Cardiovascular:  Negative for chest pain, palpitations, claudication and leg swelling.   Gastrointestinal:  Positive for abdominal pain. Negative for constipation, diarrhea and heartburn.   Genitourinary:  Negative for dysuria.   Musculoskeletal:  Negative for back pain and myalgias.   Skin:  Negative for rash.   Neurological:  Negative for focal weakness and headaches.   Psychiatric/Behavioral:  Negative for depression, memory loss and suicidal ideas. The patient is not nervous/anxious.          Medications:  Encounter Medications[2]     Allergies:  Review of patient's allergies indicates:   Allergen Reactions    Bactrim [sulfamethoxazole-trimethoprim] Hives         Physical Exam         Vitals:    04/04/25 1127   BP: 110/70   Pulse: 67         Physical Exam  Constitutional:       Appearance: She is well-developed.   Eyes:      Pupils: Pupils are equal, round, and reactive to light.   Neck:      Thyroid: No thyromegaly.   Cardiovascular:      Rate and Rhythm: Normal rate.      Heart sounds: Normal heart sounds. No murmur heard.     No friction rub. No gallop.   Pulmonary:      Breath sounds: Normal breath sounds.   Abdominal:      General: Bowel sounds are normal.      Palpations: Abdomen is soft.   Musculoskeletal:         General: Normal range of motion.      Cervical back: Normal range of motion.   Lymphadenopathy:      Cervical: No cervical adenopathy.   Skin:     General: Skin is warm.      Findings: No rash.   Neurological:      Mental Status: She is alert and oriented to person, place, and time.      Cranial Nerves: No cranial nerve deficit.   Psychiatric:         Behavior: Behavior normal.          Laboratory:  CBC:  Recent Labs   Lab Result Units 04/03/25  0210   WBC K/uL 10.33   RBC M/uL 4.48   HGB gm/dL 13.6   HCT % 40.1   Platelet Count  "K/uL 207   MCV fL 90   MCH pg 30.4   MCHC g/dL 33.9     CMP:  Recent Labs   Lab Result Units 04/03/25  0210   Calcium mg/dL 8.8   Albumin g/dL 3.9   Sodium mmol/L 138   Potassium mmol/L 3.6   CO2 mmol/L 23   Chloride mmol/L 107   BUN mg/dL 10   ALP unit/L 125   ALT unit/L 60*   AST unit/L 104*   Bilirubin Total mg/dL 0.7     URINALYSIS:  Recent Labs   Lab Result Units 04/03/25  0210   Color, UA  Yellow   Spec Grav UA  1.010   pH, UA  8.0   Protein, UA  Negative   Nitrites, UA  Negative   Leukocyte Esterase, UA  Negative   Urobilinogen, UA EU/dL Negative      LIPIDS:  Recent Labs   Lab Result Units 02/14/25  1155 02/14/25  1157   HDL mg/dL  --  58   Cholesterol mg/dL  --  158   Triglycerides mg/dL 115 114   LDL Cholesterol mg/dL (calc)  --  79   HDL/Cholesterol Ratio (calc)  --  2.7   Non HDL Chol. (LDL+VLDL) mg/dL (calc)  --  100     TSH:  No results for input(s): "TSH" in the last 2160 hours.  A1C:  No results for input(s): "HGBA1C" in the last 2160 hours.    Radiology:        Assessment:     Kristina Graham is a 54 y.o.female with:    Calculus of gallbladder without cholecystitis without obstruction    Insomnia, unspecified type          Plan:     Problem List Items Addressed This Visit       Insomnia    Overview   Trazodone 100 mg po qhs prn insomnia          Calculus of gallbladder without cholecystitis without obstruction - Primary    Overview   Er note reviewed               As above, continue current medications and maintain follow up with specialists.  Return to clinic in 6 months.      Frederick W Dantagnan [facility]                       [1]   Social History  Socioeconomic History    Marital status:    Tobacco Use    Smoking status: Never    Smokeless tobacco: Never    Tobacco comments:     drug rep   Substance and Sexual Activity    Alcohol use: Not Currently     Comment: social    Drug use: Never    Sexual activity: Yes     Partners: Male     Birth " control/protection: Partner-Vasectomy     Social Drivers of Health     Financial Resource Strain: Low Risk  (10/3/2024)    Received from [facility]    Overall Financial Resource Strain (CARDIA)     Difficulty of Paying Living Expenses: Not hard at all   Food Insecurity: No Food Insecurity (10/3/2024)    Received from [facility]    Hunger Vital Sign     Worried About Running Out of Food in the Last Year: Never true     Ran Out of Food in the Last Year: Never true   Transportation Needs: Unmet Transportation Needs (10/3/2024)    Received from [facility]    PRAPARE - Transportation     Lack of Transportation (Medical): Yes     Lack of Transportation (Non-Medical): No   Physical Activity: Sufficiently Active (10/3/2024)    Received from [facility]    Exercise Vital Sign     Days of Exercise per Week: 3 days     Minutes of Exercise per Session: 60 min   Stress: No Stress Concern Present (10/3/2024)    Received from [facility]    [employer] [place] [employer] - Occupational Stress Questionnaire     Feeling of Stress : Only a little   Housing Stability: Low Risk  (2/6/2024)    Received from [facility]    Housing Stability Vital Sign     Unable to Pay for Housing in the Last Year: No     Number of Places Lived in the Last Year: 1     Unstable Housing in the Last Year: No   [2]   Outpatient Encounter Medications as of 4/4/2025   Medication Sig Dispense Refill    atorvastatin (LIPITOR) 10 MG tablet Take 10 mg by mouth once daily.      clindamycin-benzoyl peroxide gel Apply topically every morning.      etodolac (LODINE XL) 400 MG 24 hr tablet TAKE 1 TABLET BY MOUTH EVERY DAY 90 tablet 0    lifitegrast (XIIDRA) 5 % Dpet Place 1 drop into both eyes 2 (two) times daily. 60 each 11    magnesium oxide (MAG-OX) 400 mg tablet Take 400 mg by mouth once daily.      perfluorohexyloctane, PF, (MIEBO, PF,) 100 % Drop Place 1 drop into both eyes 4 (four) times daily. 3 mL 11    semaglutide (OZEMPIC) 1 mg/dose (4 mg/3 mL)        spironolactone (ALDACTONE) 100 MG tablet Take 1 tablet (100 mg total) by mouth once daily. 30 tablet 6    traZODone (DESYREL) 100 MG tablet Take 1 tablet (100 mg total) by mouth every evening. 30 tablet 0    tretinoin (RETIN-A) 0.025 % cream SMARTSIG:Sparingly Topical Every Night      VEOZAH 45 mg Tab Take 1 tablet by mouth once daily.      [DISCONTINUED] calcitRIOL (ROCALTROL) 0.25 MCG Cap Take 0.25 mcg by mouth 2 (two) times daily.      [DISCONTINUED] cyanocobalamin 1,000 mcg/mL injection Inject 1 mL (1,000 mcg total) into the muscle every 30 days. 3 mL 13    [DISCONTINUED] ethynodiol-ethinyl estradiol (KELNOR) 1-35 mg-mcg per tablet Take 1 tablet by mouth once daily. 90 tablet 3    [DISCONTINUED] levothyroxine (SYNTHROID) 75 MCG tablet Take 75 mcg by mouth before breakfast.      [DISCONTINUED] methen-m.blue-s.phos-phsal-hyo (URIBEL) 118-10-40.8-36 mg Cap Take 1 capsule by mouth 4 (four) times daily as needed.      [DISCONTINUED] ondansetron (ZOFRAN-ODT) 4 MG TbDL Take 1 tablet (4 mg total) by mouth every 8 (eight) hours as needed. 10 tablet 0    [DISCONTINUED] phenazopyridine (PYRIDIUM) 200 MG tablet Take 200 mg by mouth 3 (three) times daily.      [] iohexoL (OMNIPAQUE 350) injection 100 mL       [] morphine injection 4 mg   0    [] morphine injection 4 mg   0     No facility-administered encounter medications on file as of 2025.

## 2025-04-08 ENCOUNTER — OFFICE VISIT (OUTPATIENT)
Dept: SURGERY | Facility: CLINIC | Age: 55
End: 2025-04-08
Payer: COMMERCIAL

## 2025-04-08 VITALS
DIASTOLIC BLOOD PRESSURE: 70 MMHG | SYSTOLIC BLOOD PRESSURE: 128 MMHG | WEIGHT: 175 LBS | BODY MASS INDEX: 25.92 KG/M2 | HEIGHT: 69 IN | HEART RATE: 83 BPM | OXYGEN SATURATION: 96 %

## 2025-04-08 DIAGNOSIS — K81.1 CHRONIC CHOLECYSTITIS: ICD-10-CM

## 2025-04-08 DIAGNOSIS — K80.10 CHRONIC CALCULOUS CHOLECYSTITIS: Primary | ICD-10-CM

## 2025-04-08 DIAGNOSIS — K80.20 CALCULUS OF GALLBLADDER WITHOUT CHOLECYSTITIS WITHOUT OBSTRUCTION: ICD-10-CM

## 2025-04-08 DIAGNOSIS — K80.80 BILIARY CALCULUS OF OTHER SITE WITHOUT OBSTRUCTION: ICD-10-CM

## 2025-04-08 PROCEDURE — 3044F HG A1C LEVEL LT 7.0%: CPT | Mod: CPTII,S$GLB,, | Performed by: SURGERY

## 2025-04-08 PROCEDURE — 3008F BODY MASS INDEX DOCD: CPT | Mod: CPTII,S$GLB,, | Performed by: SURGERY

## 2025-04-08 PROCEDURE — 1159F MED LIST DOCD IN RCRD: CPT | Mod: CPTII,S$GLB,, | Performed by: SURGERY

## 2025-04-08 PROCEDURE — 1160F RVW MEDS BY RX/DR IN RCRD: CPT | Mod: CPTII,S$GLB,, | Performed by: SURGERY

## 2025-04-08 PROCEDURE — 3074F SYST BP LT 130 MM HG: CPT | Mod: CPTII,S$GLB,, | Performed by: SURGERY

## 2025-04-08 PROCEDURE — 3078F DIAST BP <80 MM HG: CPT | Mod: CPTII,S$GLB,, | Performed by: SURGERY

## 2025-04-08 PROCEDURE — 99204 OFFICE O/P NEW MOD 45 MIN: CPT | Mod: S$GLB,,, | Performed by: SURGERY

## 2025-04-08 PROCEDURE — 99999 PR PBB SHADOW E&M-EST. PATIENT-LVL V: CPT | Mod: PBBFAC,,, | Performed by: SURGERY

## 2025-04-08 NOTE — H&P (VIEW-ONLY)
History & Physical    SUBJECTIVE:     History of Present Illness:  Patient is a 54 y.o. female referred from the emergency department for evaluation of gallstones and biliary colic.  5 days ago patient was awoken from sleep at 2:30 a.m. in the morning with intense epigastric pain which radiated around to her left upper abdomen.  It was unrelenting.  It was associated with intense nausea and vomiting.  Patient was quite scared and thought she was having a heart attack.  She went to the emergency room and had a full workup including cardiac workup.  CT scan of the chest abdomen and pelvis only revealed gallstones with no signs of acute cholecystitis.  She had a slight elevation in her transaminases.  While in the emergency room the pain eventually eased and she was discharged with follow-up with general surgery.  Although she was pain-free she felt weak all the next day.  She is here today for that follow-up.  She has had no similar attacks since.  However, she feels a vague upper abdominal weakness/tenderness but no serious cramping pain.  She has had no change in the color of her urine.    Pertinently, she has postsurgical Hypoparathyroidism requiring daily calcium replacement and therapy  Review of patient's allergies indicates:   Allergen Reactions    Bactrim [sulfamethoxazole-trimethoprim] Hives       Current Medications[1]    Past Medical History:   Diagnosis Date    Annual physical exam 2024    Arthritis     Mixed hyperlipidemia 2022    Nontoxic multinodular goiter 2017    Rheumatoid arthritis(714.0)      Past Surgical History:   Procedure Laterality Date     SECTION, LOW TRANSVERSE      x2    REPAIR OF TENDON OF LOWER EXTREMITY Right 2023    Procedure: REPAIR, TENDON, LOWER EXTREMITY;  Surgeon: Jessee HENRIQUEZ II() MD Eugenio;  Location: [facility];  Service: Orthopedics;  Laterality: Right;    THYROIDECTOMY       Family History   Problem Relation Name Age of Onset    Cancer Mother  Kristina Faucheux     Diabetes Mother Kristina Faucheux     Hypertension Mother Kristina Faucheux     Cataracts Mother Kristina Faucheux     Glaucoma Mother Kristina Faucheux     Arthritis Mother Kristina Faucheux     Asthma Mother Kristina Faucheux     Cancer Father Kristina Faucheux     Hypertension Father Kristina Faucheux     Heart disease Father Kristina Faucheux     Heart disease Paternal Grandfather Kristina Faucheux     Hypertension Paternal Grandfather Kristina Faucheux     Hypertension Sister Kristina Faucheux     Cancer Maternal Aunt Kristina Faucheux     Cancer Paternal Uncle Kristina Faucheux     Hypertension Paternal Uncle Kristina Faucheux     Cancer Maternal Grandmother Kristina Faucheux     Diabetes Maternal Grandmother Kristina Faucheux     Hypertension Maternal Grandmother Kristina Faucheux     Glaucoma Maternal Grandmother Kristina Faucheux     Heart disease Paternal Grandmother Kristina Faucheux     Hypertension Paternal Grandmother Kristina Faucheux     Glaucoma Maternal Grandfather      Amblyopia Neg Hx      Blindness Neg Hx      Macular degeneration Neg Hx      Retinal detachment Neg Hx      Strabismus Neg Hx       Social History[2]     Review of Systems:  Review of Systems   Constitutional:  Negative for appetite change, fatigue, fever and unexpected weight change.   HENT:  Negative for sore throat and trouble swallowing.    Eyes: Negative.    Respiratory:  Negative for cough, shortness of breath and wheezing.    Cardiovascular:  Negative for chest pain and leg swelling.   Gastrointestinal:  Negative for abdominal distention, abdominal pain, blood in stool, constipation, diarrhea, nausea and vomiting.        See HPI   Endocrine: Negative.    Genitourinary: Negative.    Musculoskeletal:  Negative for back pain.   Skin: Negative.  Negative for rash.   Allergic/Immunologic: Negative.    Neurological: Negative.    Hematological: Negative.    Psychiatric/Behavioral:  Negative for confusion.        OBJECTIVE:     Vital  "Signs (Most Recent)  Pulse: 83 (04/08/25 1409)  BP: 128/70 (04/08/25 1409)  SpO2: 96 % (04/08/25 1409)  5' 9" (1.753 m)  79.4 kg (175 lb)     Physical Exam:  Physical Exam  Constitutional:       General: She is not in acute distress.     Appearance: Normal appearance.   Eyes:      General: No scleral icterus.  Cardiovascular:      Rate and Rhythm: Normal rate and regular rhythm.   Pulmonary:      Breath sounds: Normal breath sounds.   Abdominal:      General: There is no distension.      Palpations: Abdomen is soft.      Tenderness: There is no abdominal tenderness.   Musculoskeletal:         General: Normal range of motion.      Cervical back: Neck supple.   Lymphadenopathy:      Cervical: No cervical adenopathy.   Skin:     General: Skin is warm and dry.      Findings: No rash.   Neurological:      General: No focal deficit present.      Mental Status: She is alert.   Psychiatric:         Mood and Affect: Mood normal.         Laboratory  CBC: Reviewed  CMP: Reviewed    Diagnostic Results:  CT: Reviewed    ASSESSMENT/PLAN:     1. Chronic calculous cholecystitis        2. Biliary calculus of other site without obstruction  Ambulatory referral/consult to General Surgery      3. Chronic cholecystitis  Case Request Operating Room: DV5 ROBOTIC CHOLECYSTECTOMY      4. Calculus of gallbladder without cholecystitis without obstruction  Case Request Operating Room: DV5 ROBOTIC CHOLECYSTECTOMY       Postsurgical Hypoparathyroidism requiring daily calcium replacement and therapy    PLAN:Plan     Robotic cholecystectomy  Patient understands reasons for conversion to laparoscopic or open procedure    The general risks of the operation were described to the patient in detail and included on an informed consent sheet, which I reviewed with the patientand which the patient has signed.  Illustrations were provided for clarity.  The patient was offered the opportunity to ask questions, on multiple occasions, and after continued " discussion had none additional.  The patient is ready to proceed with the operation.  Call the office or report to the emergency department if symptoms worsen prior to the operation          46 minutes were spent in chart review, documentation and review of results, and evaluation, treatment, and counseling of patient on the same day of service.          [1]   Current Outpatient Medications   Medication Sig Dispense Refill    atorvastatin (LIPITOR) 10 MG tablet Take 10 mg by mouth once daily.      etodolac (LODINE XL) 400 MG 24 hr tablet TAKE 1 TABLET BY MOUTH EVERY DAY 90 tablet 0    lifitegrast (XIIDRA) 5 % Dpet Place 1 drop into both eyes 2 (two) times daily. 60 each 11    magnesium oxide (MAG-OX) 400 mg tablet Take 400 mg by mouth once daily.      perfluorohexyloctane, PF, (MIEBO, PF,) 100 % Drop Place 1 drop into both eyes 4 (four) times daily. 3 mL 11    spironolactone (ALDACTONE) 100 MG tablet Take 1 tablet (100 mg total) by mouth once daily. (Patient taking differently: Take 50 mg by mouth once daily.) 30 tablet 6    tretinoin (RETIN-A) 0.025 % cream SMARTSIG:Sparingly Topical Every Night      VEOZAH 45 mg Tab Take 1 tablet by mouth once daily.      clindamycin-benzoyl peroxide gel Apply topically every morning.      semaglutide (OZEMPIC) 1 mg/dose (4 mg/3 mL)       traZODone (DESYREL) 100 MG tablet Take 1 tablet (100 mg total) by mouth every evening. 30 tablet 0     No current facility-administered medications for this visit.   [2]   Social History  Tobacco Use    Smoking status: Never    Smokeless tobacco: Never    Tobacco comments:     drug rep   Substance Use Topics    Alcohol use: Not Currently     Comment: social    Drug use: Never

## 2025-04-08 NOTE — PROGRESS NOTES
History & Physical    SUBJECTIVE:     History of Present Illness:  Patient is a 54 y.o. female referred from the emergency department for evaluation of gallstones and biliary colic.  5 days ago patient was awoken from sleep at 2:30 a.m. in the morning with intense epigastric pain which radiated around to her left upper abdomen.  It was unrelenting.  It was associated with intense nausea and vomiting.  Patient was quite scared and thought she was having a heart attack.  She went to the emergency room and had a full workup including cardiac workup.  CT scan of the chest abdomen and pelvis only revealed gallstones with no signs of acute cholecystitis.  She had a slight elevation in her transaminases.  While in the emergency room the pain eventually eased and she was discharged with follow-up with general surgery.  Although she was pain-free she felt weak all the next day.  She is here today for that follow-up.  She has had no similar attacks since.  However, she feels a vague upper abdominal weakness/tenderness but no serious cramping pain.  She has had no change in the color of her urine.    Pertinently, she has postsurgical Hypoparathyroidism requiring daily calcium replacement and therapy  Review of patient's allergies indicates:   Allergen Reactions    Bactrim [sulfamethoxazole-trimethoprim] Hives       Current Medications[1]    Past Medical History:   Diagnosis Date    Annual physical exam 2024    Arthritis     Mixed hyperlipidemia 2022    Nontoxic multinodular goiter 2017    Rheumatoid arthritis(714.0)      Past Surgical History:   Procedure Laterality Date     SECTION, LOW TRANSVERSE      x2    REPAIR OF TENDON OF LOWER EXTREMITY Right 2023    Procedure: REPAIR, TENDON, LOWER EXTREMITY;  Surgeon: eJssee HENRIQUEZ II() MD Eugenio;  Location: [facility];  Service: Orthopedics;  Laterality: Right;    THYROIDECTOMY       Family History   Problem Relation Name Age of Onset    Cancer Mother  Kristina Faucheux     Diabetes Mother Kristina Faucheux     Hypertension Mother Kristina Faucheux     Cataracts Mother Kristina Faucheux     Glaucoma Mother Kristina Faucheux     Arthritis Mother Kristnia Faucheux     Asthma Mother Kristina Faucheux     Cancer Father Kristina Faucheux     Hypertension Father Kristina Faucheux     Heart disease Father Kristina Faucheux     Heart disease Paternal Grandfather Kristina Faucheux     Hypertension Paternal Grandfather Kristina Faucheux     Hypertension Sister Kristina Faucheux     Cancer Maternal Aunt Kristina Faucheux     Cancer Paternal Uncle Kristina Faucheux     Hypertension Paternal Uncle Kristina Faucheux     Cancer Maternal Grandmother Kristina Faucheux     Diabetes Maternal Grandmother Kristina Faucheux     Hypertension Maternal Grandmother Kristina Faucheux     Glaucoma Maternal Grandmother Kristina Faucheux     Heart disease Paternal Grandmother Kristina Faucheux     Hypertension Paternal Grandmother Kristina Faucheux     Glaucoma Maternal Grandfather      Amblyopia Neg Hx      Blindness Neg Hx      Macular degeneration Neg Hx      Retinal detachment Neg Hx      Strabismus Neg Hx       Social History[2]     Review of Systems:  Review of Systems   Constitutional:  Negative for appetite change, fatigue, fever and unexpected weight change.   HENT:  Negative for sore throat and trouble swallowing.    Eyes: Negative.    Respiratory:  Negative for cough, shortness of breath and wheezing.    Cardiovascular:  Negative for chest pain and leg swelling.   Gastrointestinal:  Negative for abdominal distention, abdominal pain, blood in stool, constipation, diarrhea, nausea and vomiting.        See HPI   Endocrine: Negative.    Genitourinary: Negative.    Musculoskeletal:  Negative for back pain.   Skin: Negative.  Negative for rash.   Allergic/Immunologic: Negative.    Neurological: Negative.    Hematological: Negative.    Psychiatric/Behavioral:  Negative for confusion.        OBJECTIVE:     Vital  "Signs (Most Recent)  Pulse: 83 (04/08/25 1409)  BP: 128/70 (04/08/25 1409)  SpO2: 96 % (04/08/25 1409)  5' 9" (1.753 m)  79.4 kg (175 lb)     Physical Exam:  Physical Exam  Constitutional:       General: She is not in acute distress.     Appearance: Normal appearance.   Eyes:      General: No scleral icterus.  Cardiovascular:      Rate and Rhythm: Normal rate and regular rhythm.   Pulmonary:      Breath sounds: Normal breath sounds.   Abdominal:      General: There is no distension.      Palpations: Abdomen is soft.      Tenderness: There is no abdominal tenderness.   Musculoskeletal:         General: Normal range of motion.      Cervical back: Neck supple.   Lymphadenopathy:      Cervical: No cervical adenopathy.   Skin:     General: Skin is warm and dry.      Findings: No rash.   Neurological:      General: No focal deficit present.      Mental Status: She is alert.   Psychiatric:         Mood and Affect: Mood normal.         Laboratory  CBC: Reviewed  CMP: Reviewed    Diagnostic Results:  CT: Reviewed    ASSESSMENT/PLAN:     1. Chronic calculous cholecystitis        2. Biliary calculus of other site without obstruction  Ambulatory referral/consult to General Surgery      3. Chronic cholecystitis  Case Request Operating Room: DV5 ROBOTIC CHOLECYSTECTOMY      4. Calculus of gallbladder without cholecystitis without obstruction  Case Request Operating Room: DV5 ROBOTIC CHOLECYSTECTOMY       Postsurgical Hypoparathyroidism requiring daily calcium replacement and therapy    PLAN:Plan     Robotic cholecystectomy  Patient understands reasons for conversion to laparoscopic or open procedure    The general risks of the operation were described to the patient in detail and included on an informed consent sheet, which I reviewed with the patientand which the patient has signed.  Illustrations were provided for clarity.  The patient was offered the opportunity to ask questions, on multiple occasions, and after continued " discussion had none additional.  The patient is ready to proceed with the operation.  Call the office or report to the emergency department if symptoms worsen prior to the operation          46 minutes were spent in chart review, documentation and review of results, and evaluation, treatment, and counseling of patient on the same day of service.          [1]   Current Outpatient Medications   Medication Sig Dispense Refill    atorvastatin (LIPITOR) 10 MG tablet Take 10 mg by mouth once daily.      etodolac (LODINE XL) 400 MG 24 hr tablet TAKE 1 TABLET BY MOUTH EVERY DAY 90 tablet 0    lifitegrast (XIIDRA) 5 % Dpet Place 1 drop into both eyes 2 (two) times daily. 60 each 11    magnesium oxide (MAG-OX) 400 mg tablet Take 400 mg by mouth once daily.      perfluorohexyloctane, PF, (MIEBO, PF,) 100 % Drop Place 1 drop into both eyes 4 (four) times daily. 3 mL 11    spironolactone (ALDACTONE) 100 MG tablet Take 1 tablet (100 mg total) by mouth once daily. (Patient taking differently: Take 50 mg by mouth once daily.) 30 tablet 6    tretinoin (RETIN-A) 0.025 % cream SMARTSIG:Sparingly Topical Every Night      VEOZAH 45 mg Tab Take 1 tablet by mouth once daily.      clindamycin-benzoyl peroxide gel Apply topically every morning.      semaglutide (OZEMPIC) 1 mg/dose (4 mg/3 mL)       traZODone (DESYREL) 100 MG tablet Take 1 tablet (100 mg total) by mouth every evening. 30 tablet 0     No current facility-administered medications for this visit.   [2]   Social History  Tobacco Use    Smoking status: Never    Smokeless tobacco: Never    Tobacco comments:     drug rep   Substance Use Topics    Alcohol use: Not Currently     Comment: social    Drug use: Never

## 2025-04-09 ENCOUNTER — ANESTHESIA EVENT (OUTPATIENT)
Dept: SURGERY | Facility: OTHER | Age: 55
End: 2025-04-09
Payer: COMMERCIAL

## 2025-04-09 NOTE — ANESTHESIA PREPROCEDURE EVALUATION
04/09/2025  Kristina Graham is a 54 y.o., female.      Pre-op Assessment    I have reviewed the Patient Summary Reports.     I have reviewed the Nursing Notes. I have reviewed the NPO Status.   I have reviewed the Medications.     Review of Systems  Anesthesia Hx:             Denies Family Hx of Anesthesia complications.    Denies Personal Hx of Anesthesia complications.                    Social:  Non-Smoker       Hematology/Oncology:  Hematology Normal   Oncology Normal                                   EENT/Dental:  EENT/Dental Normal           Cardiovascular:  Cardiovascular Normal                                              Pulmonary:    Asthma (Childhood, no issues since)                    Renal/:  Renal/ Normal                 Hepatic/GI:      Liver Disease, (midly elevated liver enzymes)  Off since 3/21/25 Taking GLP-1 Agonists            Musculoskeletal:  Arthritis (Rheumatoid arthritis)   Rheumatoid Arthritis            Neurological:  Neurology Normal                                      Endocrine:   Hypothyroidism     Parathyroid Disease Hypoparathyroidism  hypocalcemia           Dermatological:  Skin Normal    Psych:  Psychiatric Normal                  Physical Exam  General: Well nourished, Cooperative, Alert and Oriented    Airway:  Mallampati: III   Mouth Opening: Small, but > 3cm  TM Distance: Normal  Tongue: Normal  Neck ROM: Normal ROM    Dental:  Intact, Caps / Implants  Caps on upper front teeth      Anesthesia Plan  Type of Anesthesia, risks & benefits discussed:    Anesthesia Type: Gen ETT  Intra-op Monitoring Plan: Standard ASA Monitors  Post Op Pain Control Plan: multimodal analgesia  Induction:  IV  Airway Plan: Video, Post-Induction  ASA Score: 3  Anesthesia Plan Notes: Labs from 04/2025 in epic  Last GLP-1 3/21/25    Ready For Surgery From Anesthesia Perspective.      .

## 2025-04-10 ENCOUNTER — HOSPITAL ENCOUNTER (OUTPATIENT)
Dept: PREADMISSION TESTING | Facility: OTHER | Age: 55
Discharge: HOME | End: 2025-04-10
Attending: SURGERY
Payer: COMMERCIAL

## 2025-04-10 VITALS
WEIGHT: 175 LBS | RESPIRATION RATE: 16 BRPM | TEMPERATURE: 98 F | SYSTOLIC BLOOD PRESSURE: 117 MMHG | HEIGHT: 69 IN | DIASTOLIC BLOOD PRESSURE: 60 MMHG | HEART RATE: 60 BPM | OXYGEN SATURATION: 99 % | BODY MASS INDEX: 25.92 KG/M2

## 2025-04-10 RX ORDER — ACETAMINOPHEN 500 MG
1000 TABLET ORAL
OUTPATIENT
Start: 2025-04-10 | End: 2025-04-10

## 2025-04-10 RX ORDER — PALOPEGTERIPARATIDE 294 UG/.98ML
INJECTION, SOLUTION SUBCUTANEOUS
COMMUNITY
Start: 2025-04-01

## 2025-04-10 RX ORDER — SODIUM CHLORIDE, SODIUM LACTATE, POTASSIUM CHLORIDE, CALCIUM CHLORIDE 600; 310; 30; 20 MG/100ML; MG/100ML; MG/100ML; MG/100ML
INJECTION, SOLUTION INTRAVENOUS CONTINUOUS
OUTPATIENT
Start: 2025-04-10

## 2025-04-10 RX ORDER — LIDOCAINE HYDROCHLORIDE 10 MG/ML
0.5 INJECTION, SOLUTION EPIDURAL; INFILTRATION; INTRACAUDAL; PERINEURAL ONCE
OUTPATIENT
Start: 2025-04-10 | End: 2025-04-10

## 2025-04-10 RX ORDER — LEVOTHYROXINE SODIUM 75 UG/1
1 CAPSULE ORAL DAILY
COMMUNITY
Start: 2025-01-20

## 2025-04-10 RX ORDER — ASCORBIC ACID 500 MG
500 TABLET ORAL DAILY
COMMUNITY

## 2025-04-10 NOTE — DISCHARGE INSTRUCTIONS
Information to Prepare you for your Surgery    PRE-ADMIT TESTING -  [phone]    [address] [facility]          Your surgery has been scheduled at [facility]. We are pleased to have the opportunity to serve you. For Further Information please call [phone].    On the day of surgery please report to the Information Desk on the 1st floor.    CONTACT YOUR PHYSICIAN'S OFFICE THE DAY PRIOR TO YOUR SURGERY TO OBTAIN YOUR ARRIVAL TIME.     The evening before surgery do not eat anything after 9 p.m. ( this includes hard candy, chewing gum and mints).  You may only have GATORADE, POWERADE AND WATER  from 9 p.m. until you leave your home.   DO NOT DRINK ANY LIQUIDS ON THE WAY TO THE HOSPITAL.      Why does your anesthesiologist allow you to drink Gatorade/Powerade before surgery?  Gatorade/Powerade helps to increase your comfort before surgery and to decrease your nausea after surgery. The carbohydrates in Gatorade/Powerade help reduce your body's stress response to surgery.  If you are a diabetic-drink only water prior to surgery.    Outpatient Surgery- May allow 2 adult (18 and older) Support Persons (1 being the designated ) for all surgical/procedural patients. A breastfeeding mother will be allowed her infant and 2 adult Support Persons. No one under the age of 18 will be allowed in the building.       SPECIAL MEDICATION INSTRUCTIONS: TAKE medications checked off by the Anesthesiologist on your Medication List.    Angiogram Patients: Take medications as instructed by your physician, including aspirin.     Surgery Patients:    If you take ASPIRIN - Your PHYSICIAN/SURGEON will need to inform you IF/OR when you need to stop taking aspirin prior to your surgery.     The week prior to surgery do not ot take any medications containing IBUPROFEN or NSAIDS ( Advil, Motrin, Goodys, BC, Aleve, Naproxen etc) If you are not sure if you should take a medicine  please call your surgeon's office.  Ok to take Tylenol    Do Not Wear any make-up (especially eye make-up) to surgery. Please remove any false eyelashes or eyelash extensions. If you arrive the day of surgery with makeup/eyelashes on you will be required to remove prior to surgery. (There is a risk of corneal abrasions if eye makeup/eyelash extensions are not removed)      Leave all valuables at home.   Do Not wear any jewelry or watches, including any metal in body piercings. Jewelry must be removed prior to coming to the hospital.  There is a possibility that rings that are unable to be removed may be cut off if they are on the surgical extremity.    Please remove all hair extensions, wigs, clips and any other metal accessories/ ornaments from your hair.  These items may pose a flammable/fire risk in Surgery and must be removed.    Do not shave your surgical area at least 5 days prior to your surgery. The surgical prep will be performed at the hospital according to Infection Control regulations.    Contact Lens must be removed before surgery. Either do not wear the contact lens or bring a case and solution for storage.  Please bring a container for eyeglasses or dentures as required.  Bring any paperwork your physician has provided, such as consent forms,  history and physicals, doctor's orders, etc.   Bring comfortable clothes that are loose fitting to wear upon discharge. Take into consideration the type of surgery being performed.  Maintain your diet as advised per your physician the day prior to surgery.      Adequate rest the night before surgery is advised.   Park in the Parking lot behind the hospital or in the [facility] across the street from the parking lot. Parking is complimentary.  If you will be discharged the same day as your procedure, please arrange for a responsible adult to drive you home or to accompany you if traveling by taxi.   YOU WILL NOT BE PERMITTED TO DRIVE OR TO LEAVE THE  HOSPITAL ALONE AFTER SURGERY.   If you are being discharged the same day, it is strongly recommended that you arrange for someone to remain with you for the first 24 hrs following your surgery.    The Surgeon will speak to your family/visitor after your surgery regarding the outcome of your surgery and post op care.  The Surgeon may speak to you after your surgery, but there is a possibility you may not remember the details.  Please check with your family members regarding the conversation with the Surgeon.    We strongly recommend whoever is bringing you home be present for discharge instructions.  This will ensure a thorough understanding for your post op home care.    If the patient has fever, cough, or signs/symptoms of Flu or Covid please do not come in for your surgery. Contact your surgeon and your primary care physician for further instructions.           Thank you for your cooperation.  The Staff of [facility].            Bathing Instructions with Hibiclens    Shower the evening before and morning of your procedure with Chlorhexidine (Hibiclens)  do not use Chlorhexidine on your face or genitals. Do not get in your eyes.  Wash your face with water and your regular face wash/soap  Use your regular shampoo  Apply Chlorhexidine (Hibiclens) directly on your skin or on a wet washcloth and wash gently. When showering: Move away from the shower stream when applying Chlorhexidine (Hibiclens) to avoid rinsing off too soon.  Rinse thoroughly with warm water  Do not dilute Chlorhexidine (Hibiclens)   Dry off as usual, do not use any deodorant, powder, body lotions, perfume, after shave or cologne.

## 2025-04-14 ENCOUNTER — HOSPITAL ENCOUNTER (OUTPATIENT)
Facility: OTHER | Age: 55
Discharge: HOME | End: 2025-04-14
Attending: SURGERY | Admitting: SURGERY
Payer: COMMERCIAL

## 2025-04-14 ENCOUNTER — ANESTHESIA (OUTPATIENT)
Dept: SURGERY | Facility: OTHER | Age: 55
End: 2025-04-14
Payer: COMMERCIAL

## 2025-04-14 VITALS
TEMPERATURE: 98 F | HEART RATE: 63 BPM | RESPIRATION RATE: 18 BRPM | DIASTOLIC BLOOD PRESSURE: 68 MMHG | SYSTOLIC BLOOD PRESSURE: 122 MMHG | OXYGEN SATURATION: 98 %

## 2025-04-14 DIAGNOSIS — K80.10 CHRONIC CALCULOUS CHOLECYSTITIS: Primary | ICD-10-CM

## 2025-04-14 DIAGNOSIS — K81.1 CHRONIC CHOLECYSTITIS: ICD-10-CM

## 2025-04-14 DIAGNOSIS — K80.20 CALCULUS OF GALLBLADDER WITHOUT CHOLECYSTITIS WITHOUT OBSTRUCTION: ICD-10-CM

## 2025-04-14 LAB
ANION GAP (OHS): 8 MMOL/L (ref 8–16)
BUN SERPL-MCNC: 12 MG/DL (ref 6–20)
CALCIUM SERPL-MCNC: 7.6 MG/DL (ref 8.7–10.5)
CHLORIDE SERPL-SCNC: 109 MMOL/L (ref 95–110)
CO2 SERPL-SCNC: 21 MMOL/L (ref 23–29)
CREAT SERPL-MCNC: 0.8 MG/DL (ref 0.5–1.4)
GFR SERPLBLD CREATININE-BSD FMLA CKD-EPI: >60 ML/MIN/1.73/M2
GLUCOSE SERPL-MCNC: 117 MG/DL (ref 70–110)
POTASSIUM SERPL-SCNC: 4 MMOL/L (ref 3.5–5.1)
SODIUM SERPL-SCNC: 138 MMOL/L (ref 136–145)

## 2025-04-14 PROCEDURE — 71000016 HC POSTOP RECOV ADDL HR: Performed by: SURGERY

## 2025-04-14 PROCEDURE — 63600175 PHARM REV CODE 636 W HCPCS: Performed by: ANESTHESIOLOGY

## 2025-04-14 PROCEDURE — 37000008 HC ANESTHESIA 1ST 15 MINUTES: Performed by: SURGERY

## 2025-04-14 PROCEDURE — 80048 BASIC METABOLIC PNL TOTAL CA: CPT | Performed by: SURGERY

## 2025-04-14 PROCEDURE — 37000009 HC ANESTHESIA EA ADD 15 MINS: Performed by: SURGERY

## 2025-04-14 PROCEDURE — 88304 TISSUE EXAM BY PATHOLOGIST: CPT | Mod: TC | Performed by: SURGERY

## 2025-04-14 PROCEDURE — 47562 LAPAROSCOPIC CHOLECYSTECTOMY: CPT | Mod: ,,, | Performed by: SURGERY

## 2025-04-14 PROCEDURE — 71000015 HC POSTOP RECOV 1ST HR: Performed by: SURGERY

## 2025-04-14 PROCEDURE — 63600175 PHARM REV CODE 636 W HCPCS: Performed by: SURGERY

## 2025-04-14 PROCEDURE — 25000003 PHARM REV CODE 250: Performed by: ANESTHESIOLOGY

## 2025-04-14 PROCEDURE — 71000033 HC RECOVERY, INTIAL HOUR: Performed by: SURGERY

## 2025-04-14 PROCEDURE — D9220A PRA ANESTHESIA: Mod: ANES,,, | Performed by: ANESTHESIOLOGY

## 2025-04-14 PROCEDURE — 25000003 PHARM REV CODE 250: Performed by: SURGERY

## 2025-04-14 PROCEDURE — D9220A PRA ANESTHESIA: Mod: CRNA,,, | Performed by: STUDENT IN AN ORGANIZED HEALTH CARE EDUCATION/TRAINING PROGRAM

## 2025-04-14 PROCEDURE — 27201423 OPTIME MED/SURG SUP & DEVICES STERILE SUPPLY: Performed by: SURGERY

## 2025-04-14 PROCEDURE — 36000710: Performed by: SURGERY

## 2025-04-14 PROCEDURE — 63600175 PHARM REV CODE 636 W HCPCS: Performed by: STUDENT IN AN ORGANIZED HEALTH CARE EDUCATION/TRAINING PROGRAM

## 2025-04-14 PROCEDURE — 36000711: Performed by: SURGERY

## 2025-04-14 PROCEDURE — 71000039 HC RECOVERY, EACH ADD'L HOUR: Performed by: SURGERY

## 2025-04-14 PROCEDURE — 25000003 PHARM REV CODE 250: Performed by: STUDENT IN AN ORGANIZED HEALTH CARE EDUCATION/TRAINING PROGRAM

## 2025-04-14 RX ORDER — HYDROMORPHONE HYDROCHLORIDE 2 MG/ML
0.4 INJECTION, SOLUTION INTRAMUSCULAR; INTRAVENOUS; SUBCUTANEOUS EVERY 5 MIN PRN
Status: DISCONTINUED | OUTPATIENT
Start: 2025-04-14 | End: 2025-04-14 | Stop reason: HOSPADM

## 2025-04-14 RX ORDER — CALCIUM CARBONATE 200(500)MG
1000 TABLET,CHEWABLE ORAL ONCE
Status: COMPLETED | OUTPATIENT
Start: 2025-04-14 | End: 2025-04-14

## 2025-04-14 RX ORDER — BUPIVACAINE HYDROCHLORIDE 2.5 MG/ML
INJECTION, SOLUTION EPIDURAL; INFILTRATION; INTRACAUDAL; PERINEURAL
Status: DISCONTINUED | OUTPATIENT
Start: 2025-04-14 | End: 2025-04-14 | Stop reason: HOSPADM

## 2025-04-14 RX ORDER — ROCURONIUM BROMIDE 10 MG/ML
INJECTION, SOLUTION INTRAVENOUS
Status: DISCONTINUED | OUTPATIENT
Start: 2025-04-14 | End: 2025-04-14

## 2025-04-14 RX ORDER — CALCIUM GLUCONATE 98 MG/ML
1 INJECTION, SOLUTION INTRAVENOUS ONCE
Status: COMPLETED | OUTPATIENT
Start: 2025-04-14 | End: 2025-04-14

## 2025-04-14 RX ORDER — SODIUM CHLORIDE 0.9 % (FLUSH) 0.9 %
3 SYRINGE (ML) INJECTION
Status: DISCONTINUED | OUTPATIENT
Start: 2025-04-14 | End: 2025-04-14 | Stop reason: HOSPADM

## 2025-04-14 RX ORDER — HYDROMORPHONE HYDROCHLORIDE 2 MG/ML
INJECTION, SOLUTION INTRAMUSCULAR; INTRAVENOUS; SUBCUTANEOUS
Status: DISCONTINUED | OUTPATIENT
Start: 2025-04-14 | End: 2025-04-14

## 2025-04-14 RX ORDER — LIDOCAINE HYDROCHLORIDE 10 MG/ML
0.5 INJECTION, SOLUTION EPIDURAL; INFILTRATION; INTRACAUDAL; PERINEURAL ONCE
Status: DISCONTINUED | OUTPATIENT
Start: 2025-04-14 | End: 2025-04-14 | Stop reason: HOSPADM

## 2025-04-14 RX ORDER — FENTANYL CITRATE 50 UG/ML
INJECTION, SOLUTION INTRAMUSCULAR; INTRAVENOUS
Status: DISCONTINUED | OUTPATIENT
Start: 2025-04-14 | End: 2025-04-14

## 2025-04-14 RX ORDER — PHENYLEPHRINE HYDROCHLORIDE 10 MG/ML
INJECTION INTRAVENOUS
Status: DISCONTINUED | OUTPATIENT
Start: 2025-04-14 | End: 2025-04-14

## 2025-04-14 RX ORDER — ACETAMINOPHEN 500 MG
1000 TABLET ORAL
Status: COMPLETED | OUTPATIENT
Start: 2025-04-14 | End: 2025-04-14

## 2025-04-14 RX ORDER — OXYCODONE HYDROCHLORIDE 5 MG/1
5 TABLET ORAL EVERY 4 HOURS PRN
Status: DISCONTINUED | OUTPATIENT
Start: 2025-04-14 | End: 2025-04-14 | Stop reason: HOSPADM

## 2025-04-14 RX ORDER — LIDOCAINE HYDROCHLORIDE 20 MG/ML
INJECTION INTRAVENOUS
Status: DISCONTINUED | OUTPATIENT
Start: 2025-04-14 | End: 2025-04-14

## 2025-04-14 RX ORDER — PROPOFOL 10 MG/ML
VIAL (ML) INTRAVENOUS
Status: DISCONTINUED | OUTPATIENT
Start: 2025-04-14 | End: 2025-04-14

## 2025-04-14 RX ORDER — PROCHLORPERAZINE EDISYLATE 5 MG/ML
5 INJECTION INTRAMUSCULAR; INTRAVENOUS EVERY 30 MIN PRN
Status: DISCONTINUED | OUTPATIENT
Start: 2025-04-14 | End: 2025-04-14 | Stop reason: HOSPADM

## 2025-04-14 RX ORDER — INDOCYANINE GREEN AND WATER 25 MG
2.5 KIT INJECTION ONCE
Status: COMPLETED | OUTPATIENT
Start: 2025-04-14 | End: 2025-04-14

## 2025-04-14 RX ORDER — DEXMEDETOMIDINE HYDROCHLORIDE 100 UG/ML
INJECTION, SOLUTION INTRAVENOUS
Status: DISCONTINUED | OUTPATIENT
Start: 2025-04-14 | End: 2025-04-14

## 2025-04-14 RX ORDER — CEFAZOLIN SODIUM 1 G/3ML
2 INJECTION, POWDER, FOR SOLUTION INTRAMUSCULAR; INTRAVENOUS
Status: COMPLETED | OUTPATIENT
Start: 2025-04-14 | End: 2025-04-14

## 2025-04-14 RX ORDER — SODIUM CHLORIDE, SODIUM LACTATE, POTASSIUM CHLORIDE, CALCIUM CHLORIDE 600; 310; 30; 20 MG/100ML; MG/100ML; MG/100ML; MG/100ML
INJECTION, SOLUTION INTRAVENOUS CONTINUOUS
Status: DISCONTINUED | OUTPATIENT
Start: 2025-04-14 | End: 2025-04-14 | Stop reason: HOSPADM

## 2025-04-14 RX ORDER — OXYCODONE AND ACETAMINOPHEN 5; 325 MG/1; MG/1
1 TABLET ORAL EVERY 4 HOURS PRN
Qty: 8 TABLET | Refills: 0 | Status: SHIPPED | OUTPATIENT
Start: 2025-04-14

## 2025-04-14 RX ORDER — LIDOCAINE HYDROCHLORIDE AND EPINEPHRINE 10; 10 UG/ML; MG/ML
INJECTION, SOLUTION INFILTRATION; PERINEURAL
Status: DISCONTINUED | OUTPATIENT
Start: 2025-04-14 | End: 2025-04-14 | Stop reason: HOSPADM

## 2025-04-14 RX ORDER — DIPHENHYDRAMINE HYDROCHLORIDE 50 MG/ML
12.5 INJECTION, SOLUTION INTRAMUSCULAR; INTRAVENOUS EVERY 30 MIN PRN
Status: DISCONTINUED | OUTPATIENT
Start: 2025-04-14 | End: 2025-04-14 | Stop reason: HOSPADM

## 2025-04-14 RX ORDER — GLUCAGON 1 MG
1 KIT INJECTION
Status: DISCONTINUED | OUTPATIENT
Start: 2025-04-14 | End: 2025-04-14 | Stop reason: HOSPADM

## 2025-04-14 RX ORDER — ONDANSETRON HYDROCHLORIDE 2 MG/ML
INJECTION, SOLUTION INTRAVENOUS
Status: DISCONTINUED | OUTPATIENT
Start: 2025-04-14 | End: 2025-04-14

## 2025-04-14 RX ORDER — DEXAMETHASONE SODIUM PHOSPHATE 4 MG/ML
INJECTION, SOLUTION INTRA-ARTICULAR; INTRALESIONAL; INTRAMUSCULAR; INTRAVENOUS; SOFT TISSUE
Status: DISCONTINUED | OUTPATIENT
Start: 2025-04-14 | End: 2025-04-14

## 2025-04-14 RX ORDER — CYCLOBENZAPRINE HCL 5 MG
10 TABLET ORAL 3 TIMES DAILY PRN
Status: DISCONTINUED | OUTPATIENT
Start: 2025-04-14 | End: 2025-04-14 | Stop reason: HOSPADM

## 2025-04-14 RX ADMIN — ACETAMINOPHEN 1000 MG: 500 TABLET ORAL at 09:04

## 2025-04-14 RX ADMIN — CALCIUM GLUCONATE 1 G: 98 INJECTION, SOLUTION INTRAVENOUS at 05:04

## 2025-04-14 RX ADMIN — DEXMEDETOMIDINE HYDROCHLORIDE 8 MCG: 100 INJECTION, SOLUTION, CONCENTRATE INTRAVENOUS at 12:04

## 2025-04-14 RX ADMIN — DEXAMETHASONE SODIUM PHOSPHATE 8 MG: 4 INJECTION, SOLUTION INTRAMUSCULAR; INTRAVENOUS at 12:04

## 2025-04-14 RX ADMIN — LIDOCAINE HYDROCHLORIDE 60 MG: 20 INJECTION, SOLUTION INTRAVENOUS at 12:04

## 2025-04-14 RX ADMIN — OXYCODONE HYDROCHLORIDE 5 MG: 5 TABLET ORAL at 02:04

## 2025-04-14 RX ADMIN — CALCIUM CARBONATE 1000 MG: 500 TABLET, CHEWABLE ORAL at 04:04

## 2025-04-14 RX ADMIN — SUGAMMADEX 200 MG: 100 INJECTION, SOLUTION INTRAVENOUS at 01:04

## 2025-04-14 RX ADMIN — SODIUM CHLORIDE: 0.9 INJECTION, SOLUTION INTRAVENOUS at 11:04

## 2025-04-14 RX ADMIN — FENTANYL CITRATE 100 MCG: 50 INJECTION, SOLUTION INTRAMUSCULAR; INTRAVENOUS at 12:04

## 2025-04-14 RX ADMIN — HYDROMORPHONE HYDROCHLORIDE 0.5 MG: 2 INJECTION INTRAMUSCULAR; INTRAVENOUS; SUBCUTANEOUS at 01:04

## 2025-04-14 RX ADMIN — CYCLOBENZAPRINE HYDROCHLORIDE 10 MG: 5 TABLET, FILM COATED ORAL at 02:04

## 2025-04-14 RX ADMIN — ROCURONIUM BROMIDE 30 MG: 10 SOLUTION INTRAVENOUS at 12:04

## 2025-04-14 RX ADMIN — INDOCYANINE GREEN AND WATER 2.5 MG: KIT at 11:04

## 2025-04-14 RX ADMIN — ROCURONIUM BROMIDE 50 MG: 10 SOLUTION INTRAVENOUS at 12:04

## 2025-04-14 RX ADMIN — PHENYLEPHRINE HYDROCHLORIDE 100 MCG: 10 INJECTION INTRAVENOUS at 12:04

## 2025-04-14 RX ADMIN — HYDROMORPHONE HYDROCHLORIDE 0.5 MG: 2 INJECTION INTRAMUSCULAR; INTRAVENOUS; SUBCUTANEOUS at 02:04

## 2025-04-14 RX ADMIN — FENTANYL CITRATE 50 MCG: 50 INJECTION, SOLUTION INTRAMUSCULAR; INTRAVENOUS at 12:04

## 2025-04-14 RX ADMIN — ONDANSETRON HYDROCHLORIDE 4 MG: 2 INJECTION INTRAMUSCULAR; INTRAVENOUS at 12:04

## 2025-04-14 RX ADMIN — PROPOFOL 200 MG: 10 INJECTION, EMULSION INTRAVENOUS at 12:04

## 2025-04-14 RX ADMIN — CEFAZOLIN 2 G: 330 INJECTION, POWDER, FOR SOLUTION INTRAMUSCULAR; INTRAVENOUS at 12:04

## 2025-04-14 RX ADMIN — PROPOFOL 50 MG: 10 INJECTION, EMULSION INTRAVENOUS at 12:04

## 2025-04-14 NOTE — OR NURSING
Pt back pain not easing up. Flexeril per Dr Bolden already given. Dr Gordon called and states she has low calcium which may be causing the muscle spasms. Pt then states her calcium was low 2 days ago and she normally takes calcium 'chews' but does not have them with her. Dr Bolden on phone with Dr Choi at bedside conferring with pt. BMP ordered and sent. Awaiting results.

## 2025-04-14 NOTE — BRIEF OP NOTE
[facility] - Surgery ([place])  Brief Operative Note    Surgery Date: 4/14/2025     Surgeons and Role:     * Pro Choi MD - Primary    Assisting Surgeon: None    Pre-op Diagnosis:  Chronic cholecystitis [K81.1]  Chronic calculous cholecystitis    Post-op Diagnosis:  Post-Op Diagnosis Codes:     * Chronic cholecystitis [K81.1]     * Calculus of gallbladder without cholecystitis without obstruction [K80.20]  Same  Procedure(s) (LRB):  DV5 ROBOTIC CHOLECYSTECTOMY (N/A)    Anesthesia: General    Operative Findings:  Chronic inflammation present.  Critical view obtained    Estimated Blood Loss: * No values recorded between 4/14/2025 11:53 AM and 4/14/2025  2:02 PM *         Specimens:   Specimen (24h ago, onward)       Start     Ordered    04/14/25 1334  Specimen to Pathology  RELEASE UPON ORDERING        References:    Click here for ordering Quick Tip   Question:  Release to patient  Answer:  Immediate    04/14/25 1334                    ID Type Source Tests Collected by Time Destination   1 : gallbladder Tissue Gallbladder SPECIMEN TO PATHOLOGY Pro Choi MD 4/14/2025 1325            Discharge Note    OUTCOME: Patient tolerated treatment/procedure well without complication and is now ready for discharge.    DISPOSITION: Home or Self Care    FINAL DIAGNOSIS:  Chronic calculous cholecystitis    FOLLOWUP: In clinic 2-3 weeks    DISCHARGE INSTRUCTIONS:    Discharge Procedure Orders   Diet general     Lifting restrictions   Order Comments: No lifting greater than 15 lb for 4 weeks     No dressing needed   Order Comments: Do not remove Steri-Strips.  Patient may shower in 2 days without covering dressings.     Call MD for:  temperature >100.4     Call MD for:  persistent nausea and vomiting     Call MD for:  severe uncontrolled pain     Call MD for:  difficulty breathing, headache or visual disturbances     Call MD for:  redness, tenderness, or signs of infection (pain, swelling, redness, odor or green/yellow  discharge around incision site)

## 2025-04-14 NOTE — DISCHARGE INSTRUCTIONS
LAPROSCOPIC CHOLECYSTECTOMY    Remove bandaids in 24 hours, if present. There may be white tape directly on your incisions (steri-strips)  which will fall off on their own in about 10 days.  You may remove them after 14 days if they have not yet fallen off.  If incisions are closed with glue, this will peel off on its own; do not peel.    Continue to deep breathe after you go home. This will help to prevent lung infection. Press a pillow across your abdomen when coughing or deep breathing to help minimize discomfort.    May shower starting tomorrow.  No tub bath, soaking in hot tub or swimming pool until cleared by physician.    No heavy lifting, strenuous exercise or strenuous activity until cleared by your surgeon.    Try to walk several times a day.  You may increase the pace and distance as tolerated.      Rest when you are tired.    Stay hydrated:  drink 8-10 glasses of fluid a day.    Avoid spicy or greasy foods.    Notify your surgeon for any of the following:      Signs of infection:  fever 100.4 or higher, excessive redness at incision sites, yellow green drainage or foul odor from incisions.  Persistent nausea and vomiting, dark urine, yellowing of skin or eyes  No bowel movement by 3 days after surgery or bowel movements that are white or gray in color    Seek emergency attention for sudden onset of chest pain, rapid heartbeat shortness of breath, pain or tenderness in calf.

## 2025-04-14 NOTE — OR NURSING
Pt's BMP resulted with a calcium of 7.6. Calcium chews given per orders.   Dr. Herman (pt's endocrinologist) was notified of pt's calcium level. Dr. Herman recommended IV calcium. Dr. Bolden notified, IV calcium ordered.

## 2025-04-14 NOTE — ANESTHESIA POSTPROCEDURE EVALUATION
Anesthesia Post Evaluation    Patient: Kristina Graham    Procedure(s) Performed: Procedure(s) (LRB):  DV5 ROBOTIC CHOLECYSTECTOMY (N/A)    Final Anesthesia Type: general      Patient location during evaluation: PACU  Patient participation: Yes- Able to Participate  Level of consciousness: awake and alert  Post-procedure vital signs: reviewed and stable  Pain management: adequate  Airway patency: patent    PONV status at discharge: No PONV  Anesthetic complications: no      Cardiovascular status: blood pressure returned to baseline  Respiratory status: unassisted  Hydration status: euvolemic  Follow-up not needed.              Vitals Value Taken Time   /67 04/14/25 15:21   Temp 36.2 °C (97.1 °F) 04/14/25 14:06   Pulse 64 04/14/25 15:23   Resp 16 04/14/25 14:16   SpO2 96 % 04/14/25 15:23   Vitals shown include unfiled device data.      No case tracking events are documented in the log.      Pain/Reyes Score: Pain Rating Prior to Med Admin: 4 (4/14/2025  2:16 PM)  Reyes Score: 9 (4/14/2025  2:36 PM)

## 2025-04-14 NOTE — OR NURSING
VSS on RA. Pain is tolerable per pt. X4 lap sites with steri strips, CDI.  PIV CDI. Meets PACU discharge criteria. Ready for transfer to phase II. Family notified.

## 2025-04-14 NOTE — OP NOTE
[facility] ([place])  Operative Note     Surgery Date: 4/14/2025      Surgeons and Role:     * Pro Choi MD - Primary     Assisting Surgeon: None     Pre-op Diagnosis:  Chronic cholecystitis [K81.1]  Chronic calculous cholecystitis     Post-op Diagnosis:  Post-Op Diagnosis Codes:     * Chronic cholecystitis [K81.1]    Same  Procedure(s) (LRB):  DV5 ROBOTIC CHOLECYSTECTOMY (N/A)     Anesthesia: General     Operative Findings:  Chronic inflammation present.  Critical view obtained     Estimated Blood Loss: * No values recorded between 4/14/2025 11:53 AM and 4/14/2025  2:02 PM * less than 5 cc         Specimens:   Specimen (24h ago, onward)          Start     Ordered     04/14/25 1334   Specimen to Pathology  RELEASE UPON ORDERING        References:    Click here for ordering Quick Tip   Question:  Release to patient  Answer:  Immediate    04/14/25 1334                          ID Type Source Tests Collected by Time Destination   1 : gallbladder Tissue Gallbladder SPECIMEN TO PATHOLOGY Pro Choi MD 4/14/2025 1325                DRAINS: None.     COMPLICATIONS: None.      The patient was identified in Preoperative Holding and  brought back to the Operating Room. Placed supine on the operating table and padded appropriately. Monitors were applied and there was smooth induction of general endotracheal anesthesia.  The patient's abdomen was prepped and draped in the standard sterile surgical fashion. A time-out was performed and all team members present agreed this was the correct procedure on the correct patient. We also confirmed administration of appropriate preoperative antibiotics.     A #12 Pacheco port was placed into the peritoneal cavity using an open cutdown technique visualizing all layers at the level of the umbilicus.  The abdomen was insufflated with carbon dioxide to a maximum pressure of 15 mmHg. Scope was placed and the abdomen was examined. There was no evidence of injury from the  initial trocar placement.  Three additional robotic ports were placed under direct vision atraumatically across the abdomen at the same level.  Robot was brought in and safely docked to the ports.  All instruments were inserted under direct vision to the target.  I then go to the console to perform the procedure.  Fundus is grasped and retracted anteriorly and superiorly.  Infundibulum was grasped and retracted inferiorly and laterally to help expose the triangle of Calot.  Peritoneal reflection is taken down off the infundibulum, cystic duct and cystic artery with blunt dissection and cautery.  These structures were skeletonized.  A large window was created between the cystic artery and the cystic duct as well as between the cystic artery and the liver bed to ensure that no other structures were entering leaving the gallbladder.  They approached the gallbladder at the appropriate angles and we have achieved the critical view.  Cystic artery is doubly clipped proximally and singly against the gallbladder.  Common bile duct is clearly visualized and out of the way.  Cystic duct is doubly clipped away from the common duct and also at the junction with the infundibulum.  Both structures are divided between the clips.  Gallbladder is taken off the liver bed with electrocautery and placed into an Endo-Catch bag and brought up to the umbilical port.  Firefly feature was used on and off during the case to assist in visualizing common duct and cystic duct.  We also inspected the liver bed at completion and showed no bile leaks there or at the clip sites.  No ducts of Luschka were seen.   Remaining robotic instruments were removed under direct vision atraumatically.  Robot is then undocked and safely stowed.  Gallbladder in the Endo-Catch bag is removed from the abdomen.  Pressure is reduced in the abdomen to almost 0.  Gallbladder bed is reinspected.  All clips are in place and there is no sign of hemorrhage or bile leak.   All remaining ports were removed under direct vision and no bleeding from any port site was noted. The insufflation of the abdomen was evacuated and the endoscope was removed. The fascial incision at the umbilical port site was closed with 0 PDS stitch in a figure of eight fashion.  Incisions were closed in a subcuticular fashion with 4-0 Vicryl.  Steri-Strips were placed. The patient was extubated in the Operating Room and transported to the Recovery Room in stable condition. All sponge, instrument and needle counts were correct at the end of the case. I was present and scrubbed for the entire procedure.

## 2025-04-14 NOTE — ANESTHESIA PROCEDURE NOTES
Intubation    Date/Time: 4/14/2025 12:06 PM    Performed by: Katerin Martinez CRNA  Authorized by: Maycol Bolden MD    Intubation:     Induction:  Intravenous    Intubated:  Postinduction    Mask Ventilation:  Easy with oral airway    Attempts:  1    Attempted By:  CRNA    Method of Intubation:  Video laryngoscopy    Blade:  Barber 3    Laryngeal View Grade: Grade I - full view of cords      Difficult Airway Encountered?: No      Complications:  None    Airway Device:  Oral endotracheal tube    Airway Device Size:  7.0    Style/Cuff Inflation:  Cuffed    Tube secured:  21    Secured at:  The lips    Placement Verified By:  Capnometry    Complicating Factors:  None    Findings Post-Intubation:  BS equal bilateral and atraumatic/condition of teeth unchanged

## 2025-04-16 LAB
ESTROGEN SERPL-MCNC: NORMAL PG/ML
INSULIN SERPL-ACNC: NORMAL U[IU]/ML
LAB AP CLINICAL INFORMATION: NORMAL
LAB AP GROSS DESCRIPTION: NORMAL
LAB AP PERFORMING LOCATION(S): NORMAL
LAB AP REPORT FOOTNOTES: NORMAL

## 2025-04-29 ENCOUNTER — OFFICE VISIT (OUTPATIENT)
Dept: SURGERY | Facility: CLINIC | Age: 55
End: 2025-04-29
Payer: COMMERCIAL

## 2025-04-29 VITALS — SYSTOLIC BLOOD PRESSURE: 116 MMHG | DIASTOLIC BLOOD PRESSURE: 68 MMHG | HEART RATE: 65 BPM | OXYGEN SATURATION: 100 %

## 2025-04-29 DIAGNOSIS — Z90.49 STATUS POST LAPAROSCOPIC CHOLECYSTECTOMY: Primary | ICD-10-CM

## 2025-04-29 PROCEDURE — 3044F HG A1C LEVEL LT 7.0%: CPT | Mod: CPTII,S$GLB,, | Performed by: SURGERY

## 2025-04-29 PROCEDURE — 3078F DIAST BP <80 MM HG: CPT | Mod: CPTII,S$GLB,, | Performed by: SURGERY

## 2025-04-29 PROCEDURE — 99024 POSTOP FOLLOW-UP VISIT: CPT | Mod: S$GLB,,, | Performed by: SURGERY

## 2025-04-29 PROCEDURE — 1159F MED LIST DOCD IN RCRD: CPT | Mod: CPTII,S$GLB,, | Performed by: SURGERY

## 2025-04-29 PROCEDURE — 3074F SYST BP LT 130 MM HG: CPT | Mod: CPTII,S$GLB,, | Performed by: SURGERY

## 2025-04-29 PROCEDURE — 1160F RVW MEDS BY RX/DR IN RCRD: CPT | Mod: CPTII,S$GLB,, | Performed by: SURGERY

## 2025-04-29 PROCEDURE — 99999 PR PBB SHADOW E&M-EST. PATIENT-LVL III: CPT | Mod: PBBFAC,,, | Performed by: SURGERY

## 2025-04-29 NOTE — PROGRESS NOTES
HPI:     Preoperative discomfort and symptoms are gone.  She is quite happy.  She reported some redness from the Steri-Strips which is resolving    PHYSICAL EXAM:  Physical Exam     In NAD    Incision intact.  Superficial skin irritation where Steri-Strips were.  No erythema      ASSESSMENT:    The patient is doing well 2 weeks status post robotic cholecystectomy .  She has had resolution of her preoperative symptoms           PLAN:    Return to the office as needed.  In 1 week she may make gradual return to regular activity

## 2025-06-03 ENCOUNTER — PATIENT MESSAGE (OUTPATIENT)
Dept: RHEUMATOLOGY | Facility: CLINIC | Age: 55
End: 2025-06-03
Payer: COMMERCIAL

## 2025-06-04 ENCOUNTER — TELEPHONE (OUTPATIENT)
Dept: RHEUMATOLOGY | Facility: CLINIC | Age: 55
End: 2025-06-04
Payer: COMMERCIAL

## 2025-06-05 ENCOUNTER — PATIENT MESSAGE (OUTPATIENT)
Dept: RHEUMATOLOGY | Facility: CLINIC | Age: 55
End: 2025-06-05
Payer: COMMERCIAL

## 2025-06-12 ENCOUNTER — PATIENT MESSAGE (OUTPATIENT)
Dept: RHEUMATOLOGY | Facility: CLINIC | Age: 55
End: 2025-06-12
Payer: COMMERCIAL

## 2025-06-16 ENCOUNTER — PATIENT MESSAGE (OUTPATIENT)
Dept: RHEUMATOLOGY | Facility: CLINIC | Age: 55
End: 2025-06-16
Payer: COMMERCIAL

## 2025-06-19 ENCOUNTER — OFFICE VISIT (OUTPATIENT)
Dept: RHEUMATOLOGY | Facility: CLINIC | Age: 55
End: 2025-06-19
Payer: COMMERCIAL

## 2025-06-19 ENCOUNTER — HOSPITAL ENCOUNTER (OUTPATIENT)
Dept: RADIOLOGY | Facility: HOSPITAL | Age: 55
Discharge: HOME | End: 2025-06-19
Attending: INTERNAL MEDICINE
Payer: COMMERCIAL

## 2025-06-19 VITALS
HEART RATE: 75 BPM | WEIGHT: 195.13 LBS | BODY MASS INDEX: 28.81 KG/M2 | DIASTOLIC BLOOD PRESSURE: 74 MMHG | SYSTOLIC BLOOD PRESSURE: 106 MMHG

## 2025-06-19 DIAGNOSIS — M25.521 RIGHT ELBOW PAIN: ICD-10-CM

## 2025-06-19 DIAGNOSIS — N20.0 KIDNEY STONE: ICD-10-CM

## 2025-06-19 DIAGNOSIS — R53.83 FATIGUE, UNSPECIFIED TYPE: ICD-10-CM

## 2025-06-19 DIAGNOSIS — E89.2 HYPOPARATHYROIDISM AFTER PROCEDURE: ICD-10-CM

## 2025-06-19 DIAGNOSIS — M79.642 BILATERAL HAND PAIN: ICD-10-CM

## 2025-06-19 DIAGNOSIS — R76.8 RHEUMATOID FACTOR POSITIVE: Primary | ICD-10-CM

## 2025-06-19 DIAGNOSIS — M79.672 LEFT FOOT PAIN: ICD-10-CM

## 2025-06-19 DIAGNOSIS — M79.641 BILATERAL HAND PAIN: ICD-10-CM

## 2025-06-19 DIAGNOSIS — R76.8 RHEUMATOID FACTOR POSITIVE: ICD-10-CM

## 2025-06-19 PROCEDURE — 3078F DIAST BP <80 MM HG: CPT | Mod: CPTII,S$GLB,, | Performed by: INTERNAL MEDICINE

## 2025-06-19 PROCEDURE — 1160F RVW MEDS BY RX/DR IN RCRD: CPT | Mod: CPTII,S$GLB,, | Performed by: INTERNAL MEDICINE

## 2025-06-19 PROCEDURE — 77077 JOINT SURVEY SINGLE VIEW: CPT | Mod: TC

## 2025-06-19 PROCEDURE — 99215 OFFICE O/P EST HI 40 MIN: CPT | Mod: S$GLB,,, | Performed by: INTERNAL MEDICINE

## 2025-06-19 PROCEDURE — 73630 X-RAY EXAM OF FOOT: CPT | Mod: TC,LT

## 2025-06-19 PROCEDURE — 71046 X-RAY EXAM CHEST 2 VIEWS: CPT | Mod: 26,,, | Performed by: RADIOLOGY

## 2025-06-19 PROCEDURE — 77077 JOINT SURVEY SINGLE VIEW: CPT | Mod: 26,,, | Performed by: RADIOLOGY

## 2025-06-19 PROCEDURE — 3044F HG A1C LEVEL LT 7.0%: CPT | Mod: CPTII,S$GLB,, | Performed by: INTERNAL MEDICINE

## 2025-06-19 PROCEDURE — 99999 PR PBB SHADOW E&M-EST. PATIENT-LVL V: CPT | Mod: PBBFAC,,, | Performed by: INTERNAL MEDICINE

## 2025-06-19 PROCEDURE — 71046 X-RAY EXAM CHEST 2 VIEWS: CPT | Mod: TC

## 2025-06-19 PROCEDURE — 3008F BODY MASS INDEX DOCD: CPT | Mod: CPTII,S$GLB,, | Performed by: INTERNAL MEDICINE

## 2025-06-19 PROCEDURE — 73070 X-RAY EXAM OF ELBOW: CPT | Mod: TC,RT

## 2025-06-19 PROCEDURE — 3074F SYST BP LT 130 MM HG: CPT | Mod: CPTII,S$GLB,, | Performed by: INTERNAL MEDICINE

## 2025-06-19 PROCEDURE — 1159F MED LIST DOCD IN RCRD: CPT | Mod: CPTII,S$GLB,, | Performed by: INTERNAL MEDICINE

## 2025-06-19 PROCEDURE — 73630 X-RAY EXAM OF FOOT: CPT | Mod: 26,LT,, | Performed by: RADIOLOGY

## 2025-06-19 RX ORDER — METHYLPREDNISOLONE 4 MG/1
TABLET ORAL
Qty: 21 TABLET | Refills: 0 | Status: SHIPPED | OUTPATIENT
Start: 2025-06-19

## 2025-06-19 RX ORDER — LANOLIN ALCOHOL/MO/W.PET/CERES
1000 CREAM (GRAM) TOPICAL
COMMUNITY

## 2025-06-19 RX ORDER — ACETAMINOPHEN 500 MG
5000 TABLET ORAL
COMMUNITY

## 2025-06-19 RX ORDER — CALCITRIOL 0.25 UG/1
0.25 CAPSULE ORAL 2 TIMES DAILY
COMMUNITY
Start: 2025-05-05

## 2025-06-19 RX ORDER — CALCITRIOL 0.5 UG/1
0.5 CAPSULE ORAL 3 TIMES DAILY
COMMUNITY
Start: 2025-06-09 | End: 2026-06-09

## 2025-06-19 NOTE — PROGRESS NOTES
Subjective:      Patient ID: Kristina Graham is a 54 y.o. female.    Chief Complaint: Joint pain    HPI female with  history of positive RF and joint pain.  History of parvovirus that caused severe joint pain in 2011.      Had right ankle surgery peroneus brevis tendonitis July 2023 and complicated by nerve being knicked.      History of B12 deficiency, vitamin D deficiency and acne treated with spironolactone.  In 2010 Parvovirus and EBV infections. IgM positive for both.      Parathyroid knicked after total thyroidectomy 2/2017.  Was receiving laser treatment for parathyroid.   Due to hypoparathyroid medication being Natpara taken off the market she developed hypocalcemia.  She now takes Transconpth (new med) injected 15 mg.  She has labs twice a week.     INTERVAL HISTORY:  Gall bladder removed removed May 2025.  Three weeks after surgery right elbow and left foot became painful with swelling.   Sheet could not touch.   Took Lodine which helped but there was still pain.   Then played pickle ball 3 weeks later and pain increased.   Of note Fall 2024 had bladder stones removed.   Using Yorvipath daily injection for parathyroid hormone for one year.  She started medrol pack which improved her pain.    Pain 7/10 ache in right elbow and and left foot. Lodine helps pain at least 60 minutes morning stiffness.            Review of Systems   Constitutional:  Positive for unexpected weight change. Negative for fever.   HENT:  Negative for mouth sores and trouble swallowing.    Eyes:  Negative for redness.   Respiratory:  Negative for cough and shortness of breath.    Cardiovascular:  Negative for chest pain.   Gastrointestinal:  Negative for constipation and diarrhea.   Genitourinary:  Negative for dysuria and genital sores.   Musculoskeletal:  Positive for arthralgias and joint swelling.        28 joint count: 1 swollen (right elbow) and 9 tender (right elbow and left 2-5th MCP and PIPs)  Pain on light compression  of the left MTPs   Skin:  Negative for rash.   Neurological:  Negative for headaches.   Hematological:  Does not bruise/bleed easily.        Objective:   /74   Pulse 75   Wt 88.5 kg (195 lb 1.7 oz)   LMP  (LMP Unknown)   BMI 28.81 kg/m²   Physical Exam   Constitutional: She is oriented to person, place, and time. normal appearance.   HENT:   Head: Normocephalic and atraumatic.   Eyes: Conjunctivae are normal.   Cardiovascular: Normal rate and regular rhythm.   Pulmonary/Chest: Effort normal.   Abdominal: Soft. Bowel sounds are normal.   Musculoskeletal:      Cervical back: Normal range of motion.      Comments: 28 joint count: 1 swollen (right elbow) and 9 tender (right elbow; 2nd-5th MCPs and PIPs)   Pain on light compression left MTPs   Neurological: She is alert and oriented to person, place, and time.   Skin: No erythema.   Psychiatric: Her behavior is normal. Mood normal.     No data to display      LABS    Component      Latest Ref [facility] 6/18/2025   WBC      3.90 - 12.70 K/uL 9.62    RBC      4.00 - 5.40 M/uL 4.65    Hemoglobin      12.0 - 16.0 gm/dL 14.2    Hematocrit      37.0 - 48.5 % 42.9    MCV      82 - 98 fL 92    MCH      27.0 - 31.0 pg 30.5    MCHC      32.0 - 36.0 g/dL 33.1    RDW      11.5 - 14.5 % 11.8    Platelet Count      150 - 450 K/uL 221    MPV      9.2 - 12.9 fL 9.3    nRBC      <=0 /100 WBC 0    Neut %      38 - 73 % 77.2 (H)    Lymph %      18 - 48 % 16.0 (L)    Mono %      4 - 15 % 5.6    Eos %      <=8 % 0.2    Basophil %      <=1.9 % 0.5    Immature Granulocytes      0.0 - 0.5 % 0.5    Gran # (ANC)      1.8 - 7.7 K/uL 7.42    Lymph #      1 - 4.8 K/uL 1.54    Mono #      0.3 - 1 K/uL 0.54    Eos #      <=0.5 K/uL 0.02    Baso #      <=0.2 K/uL 0.05    Immature Grans (Abs)      0.00 - 0.04 K/uL 0.05 (H)    CPK      20 - 180 U/L 104    CRP      <=8.2 mg/L 0.7    Sed Rate      <=20 mm/hr 7    Uric Acid      2.4 - 5.7 mg/dL 3.9    CCP Antibodies      <=4.9 U/mL <0.5    Rheumatoid  Factor      <=15.0 IU/mL 129.0 (H)    Calcium      8.7 - 10.5 mg/dL 8.1 (L)    Albumin      3.5 - 5.2 g/dL 4.2       Legend:  (H) High  (L) Low     Assessment:     1. Rheumatoid factor positive    2. Right elbow pain    3. Left foot pain    4. Fatigue, unspecified type    5. Hypoparathyroidism after procedure    6. Kidney stone      Differential diagnosis for joint pain include RA (increasing RF; extensive morning stiffness) and hypoparathyroidism (may be causing stones; gout vs pseudogout)  RA versus prolonged attack gout    Plan:     Problem List Items Addressed This Visit          Renal/    Kidney stone    Relevant Orders    XR Arthritis Survey    X-Ray Elbow 2 Views Right    X-Ray Foot Complete Left    HIV 1/2 Ag/Ab (4th Gen)    Hepatitis B surface antigen    HBcAB    Hepatitis B surface antibody    Hepatitis C antibody    Hepatitis A antibody, IgG    Strongyloides IgG Antibodies    Quantiferon Gold TB    RPR    Treponema Pallidium Antibodies IgG, IgM    Varicella zoster antibody, IgG    CBC Auto Differential    Comprehensive Metabolic Panel    Sedimentation rate    C-Reactive Protein    Uric Acid       Immunology/Multi System    Rheumatoid factor positive - Primary    Relevant Orders    XR Arthritis Survey    X-Ray Elbow 2 Views Right    X-Ray Foot Complete Left    HIV 1/2 Ag/Ab (4th Gen)    Hepatitis B surface antigen    HBcAB    Hepatitis B surface antibody    Hepatitis C antibody    Hepatitis A antibody, IgG    Strongyloides IgG Antibodies    Quantiferon Gold TB    RPR    Treponema Pallidium Antibodies IgG, IgM    Varicella zoster antibody, IgG    CBC Auto Differential    Comprehensive Metabolic Panel    Sedimentation rate    C-Reactive Protein    Uric Acid       Endocrine    Hypoparathyroidism after procedure    Relevant Orders    XR Arthritis Survey    X-Ray Elbow 2 Views Right    X-Ray Foot Complete Left    HIV 1/2 Ag/Ab (4th Gen)    Hepatitis B surface antigen    HBcAB    Hepatitis B surface antibody     Hepatitis C antibody    Hepatitis A antibody, IgG    Strongyloides IgG Antibodies    Quantiferon Gold TB    RPR    Treponema Pallidium Antibodies IgG, IgM    Varicella zoster antibody, IgG    CBC Auto Differential    Comprehensive Metabolic Panel    Sedimentation rate    C-Reactive Protein    Uric Acid       Orthopedic    Right elbow pain    Relevant Orders    XR Arthritis Survey    X-Ray Elbow 2 Views Right    X-Ray Foot Complete Left    HIV 1/2 Ag/Ab (4th Gen)    Hepatitis B surface antigen    HBcAB    Hepatitis B surface antibody    Hepatitis C antibody    Hepatitis A antibody, IgG    Strongyloides IgG Antibodies    Quantiferon Gold TB    RPR    Treponema Pallidium Antibodies IgG, IgM    Varicella zoster antibody, IgG    CBC Auto Differential    Comprehensive Metabolic Panel    Sedimentation rate    C-Reactive Protein    Uric Acid       Other    Fatigue    Relevant Orders    XR Arthritis Survey    X-Ray Elbow 2 Views Right    X-Ray Foot Complete Left    HIV 1/2 Ag/Ab (4th Gen)    Hepatitis B surface antigen    HBcAB    Hepatitis B surface antibody    Hepatitis C antibody    Hepatitis A antibody, IgG    Strongyloides IgG Antibodies    Quantiferon Gold TB    RPR    Treponema Pallidium Antibodies IgG, IgM    Varicella zoster antibody, IgG    CBC Auto Differential    Comprehensive Metabolic Panel    Sedimentation rate    C-Reactive Protein    Uric Acid    X-Ray Chest PA And Lateral     Other Visit Diagnoses         Left foot pain        Relevant Orders    XR Arthritis Survey    X-Ray Elbow 2 Views Right    X-Ray Foot Complete Left    HIV 1/2 Ag/Ab (4th Gen)    Hepatitis B surface antigen    HBcAB    Hepatitis B surface antibody    Hepatitis C antibody    Hepatitis A antibody, IgG    Strongyloides IgG Antibodies    Quantiferon Gold TB    RPR    Treponema Pallidium Antibodies IgG, IgM    Varicella zoster antibody, IgG    CBC Auto Differential    Comprehensive Metabolic Panel    Sedimentation rate    C-Reactive  Protein    Uric Acid          Repeat medrol pack if symptoms don't resolve  Check X-ray  Patient to get stone analysis report  Consider DMARD if evidence of RA on x-ray  RTO 3 months/prn

## 2025-06-19 NOTE — PROGRESS NOTES
6/16/2025     4:54 PM   Rapid3 Question Responses and Scores   MDHAQ Score 1.1   Psychologic Score 3.3   Pain Score 8   When you awakened in the morning OVER THE LAST WEEK, did you feel stiff? Yes   If Yes, please indicate the number of hours until you are as limber as you will be for the day 4   Fatigue Score 7.5   Global Health Score 6   RAPID3 Score 5.89     Answers submitted by the patient for this visit:  Rheumatology Questionnaire (Submitted on 6/16/2025)  fever: No  eye redness: No  mouth sores: No  headaches: No  shortness of breath: No  chest pain: No  trouble swallowing: No  diarrhea: No  constipation: No  unexpected weight change: Yes  genital sore: No  dysuria: No  During the last 3 days, have you had a skin rash?: No  Bruises or bleeds easily: No  cough: No       Problem: Tissue Perfusion - Cardiopulmonary, Altered  Goal: *Optimize tissue perfusion  Outcome: Progressing Towards Goal  Goal: *Absence of hypoxia  Outcome: Progressing Towards Goal     Problem: Alcohol Withdrawal  Goal: *STG: Participates in treatment plan  Outcome: Progressing Towards Goal  Goal: *STG: Remains safe in hospital  Outcome: Progressing Towards Goal  Goal: *STG: Seeks staff when symptoms of withdrawal increase  Outcome: Progressing Towards Goal  Goal: *STG: Complies with medication therapy  Outcome: Progressing Towards Goal  Goal: *STG: Attends activities and groups  Outcome: Progressing Towards Goal  Goal: *STG: Will identify negative impact of chemical dependency including the use of tobacco, alcohol, and other substances  Outcome: Progressing Towards Goal  Goal: *STG: Verbalizes abstinence as an achievable goal  Outcome: Progressing Towards Goal  Goal: *STG: Agrees to participate in outpatient after care program to support ongoing mental health  Outcome: Progressing Towards Goal  Goal: *STG: Able to indentify relapse triggers including interpersonal/social and familial factors  Outcome: Progressing Towards Goal  Goal: *STG: Identify lifestyle changes to support long term sobriety such as vocation, employment, education, and legal issues  Outcome: Progressing Towards Goal  Goal: *STG: Maintains appropriate nutrition and hydration  Outcome: Progressing Towards Goal  Goal: *STG: Vital signs within defined limits  Outcome: Progressing Towards Goal  Goal: *STG/LTG: Relapse prevention plan in place to include housing/aftercare, leisure activities, and spirituality  Outcome: Progressing Towards Goal  Goal: Interventions  Outcome: Progressing Towards Goal     Problem: Pain  Goal: *Control of Pain  Outcome: Progressing Towards Goal     Problem: Falls - Risk of  Goal: *Absence of Falls  Description  Document Rachana Fowler Fall Risk and appropriate interventions in the flowsheet.   Outcome: Progressing Towards Goal  Note:   Fall Risk Interventions:   Bed exit/alarm  Pt to call before getting out of bed  Call light within reach  Bed locked and low  Gripper socks                                                        Problem: Pressure Injury - Risk of  Goal: *Prevention of pressure injury  Description  Document Lex Scale and appropriate interventions in the flowsheet.   Outcome: Progressing Towards Goal  Note:   Pressure Injury Interventions:   Turning Q2 hours  Wedge pillows  Pressure reduction mattress

## 2025-06-20 ENCOUNTER — PATIENT MESSAGE (OUTPATIENT)
Dept: RHEUMATOLOGY | Facility: CLINIC | Age: 55
End: 2025-06-20
Payer: COMMERCIAL

## 2025-06-25 ENCOUNTER — RESULTS FOLLOW-UP (OUTPATIENT)
Dept: RHEUMATOLOGY | Facility: CLINIC | Age: 55
End: 2025-06-25

## 2025-07-01 ENCOUNTER — PATIENT MESSAGE (OUTPATIENT)
Dept: PRIMARY CARE CLINIC | Facility: CLINIC | Age: 55
End: 2025-07-01
Payer: COMMERCIAL

## 2025-07-11 ENCOUNTER — PATIENT OUTREACH (OUTPATIENT)
Dept: ADMINISTRATIVE | Facility: HOSPITAL | Age: 55
End: 2025-07-11
Payer: COMMERCIAL

## 2025-07-16 ENCOUNTER — PATIENT MESSAGE (OUTPATIENT)
Dept: RHEUMATOLOGY | Facility: CLINIC | Age: 55
End: 2025-07-16
Payer: COMMERCIAL

## 2025-07-27 ENCOUNTER — PATIENT MESSAGE (OUTPATIENT)
Dept: RHEUMATOLOGY | Facility: CLINIC | Age: 55
End: 2025-07-27
Payer: COMMERCIAL

## 2025-07-27 DIAGNOSIS — M79.641 BILATERAL HAND PAIN: ICD-10-CM

## 2025-07-27 DIAGNOSIS — R76.8 RHEUMATOID FACTOR POSITIVE: ICD-10-CM

## 2025-07-27 DIAGNOSIS — M79.642 BILATERAL HAND PAIN: ICD-10-CM

## 2025-07-28 RX ORDER — METHYLPREDNISOLONE 4 MG/1
TABLET ORAL
Qty: 21 TABLET | Refills: 0 | Status: SHIPPED | OUTPATIENT
Start: 2025-07-28

## 2025-08-01 ENCOUNTER — OFFICE VISIT (OUTPATIENT)
Dept: PRIMARY CARE CLINIC | Facility: CLINIC | Age: 55
End: 2025-08-01
Payer: COMMERCIAL

## 2025-08-01 ENCOUNTER — LAB VISIT (OUTPATIENT)
Dept: LAB | Facility: HOSPITAL | Age: 55
End: 2025-08-01
Attending: INTERNAL MEDICINE
Payer: COMMERCIAL

## 2025-08-01 VITALS
SYSTOLIC BLOOD PRESSURE: 118 MMHG | HEART RATE: 70 BPM | OXYGEN SATURATION: 98 % | WEIGHT: 194.44 LBS | RESPIRATION RATE: 18 BRPM | HEIGHT: 69 IN | DIASTOLIC BLOOD PRESSURE: 82 MMHG | BODY MASS INDEX: 28.8 KG/M2

## 2025-08-01 DIAGNOSIS — N20.0 KIDNEY STONE: ICD-10-CM

## 2025-08-01 DIAGNOSIS — R76.8 RHEUMATOID FACTOR POSITIVE: ICD-10-CM

## 2025-08-01 DIAGNOSIS — E89.2 POSTABLATIVE HYPOPARATHYROIDISM: Primary | ICD-10-CM

## 2025-08-01 DIAGNOSIS — E89.2 HYPOPARATHYROIDISM AFTER PROCEDURE: Primary | ICD-10-CM

## 2025-08-01 DIAGNOSIS — E83.51 HYPOCALCEMIA: ICD-10-CM

## 2025-08-01 LAB
ALBUMIN SERPL BCP-MCNC: 4.2 G/DL (ref 3.5–5.2)
CALCIUM SERPL-MCNC: 8.1 MG/DL (ref 8.7–10.5)

## 2025-08-01 PROCEDURE — 82310 ASSAY OF CALCIUM: CPT

## 2025-08-01 PROCEDURE — 36415 COLL VENOUS BLD VENIPUNCTURE: CPT

## 2025-08-01 PROCEDURE — 99999 PR PBB SHADOW E&M-EST. PATIENT-LVL IV: CPT | Mod: PBBFAC,,, | Performed by: INTERNAL MEDICINE

## 2025-08-01 PROCEDURE — 82040 ASSAY OF SERUM ALBUMIN: CPT

## 2025-08-01 NOTE — PROGRESS NOTES
[facility] Note    Chief Complaint      Chief Complaint   Patient presents with    Follow-up     6m       History of Present Illness      Kristina Graham is a 54 y.o. female who presents today for   Chief Complaint   Patient presents with    Follow-up     6m   .  Patient comes to appointment here for checkup . Is current dealing with multiple issues . She sis eeing endocrine for chronic hyocalcemia . Is seeing dr toussaint for rheumatology . She is also haivng issues with kidney stones .    Problem List Items Addressed This Visit       Rheumatoid factor positive    Overview   Is following with rheumatology currently.         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    Overview   Is seeing dr aniyah vail cont per there recs          Kidney stone    Overview   Now on potassium citrate cont to monitor              Past Medical History:  Past Medical History:   Diagnosis Date    Annual physical exam 2024    Arthritis     Childhood asthma     Injury of parathyroid gland     during thyroid surgery    Mixed hyperlipidemia 2022    Nontoxic multinodular goiter 2017    Rheumatoid arthritis(714.0)        Past Surgical History:  Past Surgical History:   Procedure Laterality Date     SECTION, LOW TRANSVERSE      x2    DV5 ROBOTIC CHOLECYSTECTOMY N/A 2025    Procedure: DV5 ROBOTIC CHOLECYSTECTOMY;  Surgeon: Pro Choi MD;  Location: [facility] OR;  Service: General;  Laterality: N/A;    REPAIR OF TENDON OF LOWER EXTREMITY Right 2023    Procedure: REPAIR, TENDON, LOWER EXTREMITY;  Surgeon: Jessee Becker MD (Field);  [facility]: [facility];  Service: Orthopedics;  Laterality: Right;    THYROIDECTOMY         Family History:  family history includes Arthritis in her mother; Asthma in her mother; Cancer in her father, maternal aunt, maternal grandmother, mother, and paternal uncle; Cataracts in her mother; Diabetes  in her maternal grandmother and mother; Glaucoma in her maternal grandfather, maternal grandmother, and mother; Heart disease in her father, paternal grandfather, and paternal grandmother; Hypertension in her father, maternal grandmother, mother, paternal grandfather, paternal grandmother, paternal uncle, and sister.    Social History:  Social History[1]    Review of Systems:   Review of Systems   Constitutional:  Negative for fever and weight loss.   HENT:  Negative for congestion, hearing loss and sore throat.    Eyes:  Negative for blurred vision.   Respiratory:  Negative for cough and shortness of breath.    Cardiovascular:  Negative for chest pain, palpitations, claudication and leg swelling.   Gastrointestinal:  Negative for abdominal pain, constipation, diarrhea and heartburn.   Genitourinary:  Negative for dysuria.   Musculoskeletal:  Positive for joint pain. Negative for back pain and myalgias.   Skin:  Negative for rash.   Neurological:  Negative for focal weakness and headaches.   Psychiatric/Behavioral:  Negative for depression, memory loss and suicidal ideas. The patient is not nervous/anxious.          Medications:  Encounter Medications[2]     Allergies:  Review of patient's allergies indicates:   Allergen Reactions    Bactrim [sulfamethoxazole-trimethoprim] Hives         Physical Exam         Vitals:    08/01/25 1327   BP: 118/82   Pulse: 70   Resp: 18         Physical Exam  Constitutional:       Appearance: She is well-developed.   Eyes:      Pupils: Pupils are equal, round, and reactive to light.   Neck:      Thyroid: No thyromegaly.   Cardiovascular:      Rate and Rhythm: Normal rate.      Heart sounds: Normal heart sounds. No murmur heard.     No friction rub. No gallop.   Pulmonary:      Breath sounds: Normal breath sounds.   Abdominal:      General: Bowel sounds are normal.      Palpations: Abdomen is soft.   Musculoskeletal:         General: Normal range of motion.      Cervical back: Normal  "range of motion.   Lymphadenopathy:      Cervical: No cervical adenopathy.   Skin:     General: Skin is warm.      Findings: No rash.   Neurological:      Mental Status: She is alert and oriented to person, place, and time.      Cranial Nerves: No cranial nerve deficit.   Psychiatric:         Behavior: Behavior normal.          Laboratory:  CBC:  Recent Labs   Lab Result Units 06/18/25  0850 06/26/25  1302 07/16/25  1525   WBC K/uL 9.62 9.92 6.29   RBC M/uL 4.65 4.74 4.50   HGB gm/dL 14.2 14.5 13.9   HCT % 42.9 43.0 41.5   Platelet Count K/uL 221 227 198   MCV fL 92 91 92   MCH pg 30.5 30.6 30.9   MCHC g/dL 33.1 33.7 33.5     CMP:  Recent Labs   Lab Result Units 07/16/25  1525 07/16/25  1537 07/25/25  1154   Glucose mg/dL 88 88 91   Calcium mg/dL 8.4* 8.4* 8.5*   Albumin g/dL 4.0 4.0 4.7   Protein Total gm/dL 7.8 7.7 8.0   Sodium mmol/L 143 142 143   Potassium mmol/L 3.8 3.8 4.1   CO2 mmol/L 29 28 28   Chloride mmol/L 105 106 106   BUN mg/dL 13 13 12   ALP unit/L 81 81 80   ALT unit/L 20 20 18   AST unit/L 16 16 19   Bilirubin Total mg/dL 0.6 0.6 0.6     URINALYSIS:  Recent Labs   Lab Result Units 06/26/25  1333   Color, UA  Yellow   Spec Grav UA  1.010   pH, UA  8.0   Protein, UA  Negative   Nitrites, UA  Negative   Leukocyte Esterase, UA  Negative   Urobilinogen, UA EU/dL Negative      LIPIDS:  Recent Labs   Lab Result Units 07/16/25  1537 07/25/25  1154   TSH uIU/mL 0.970 1.294     TSH:  Recent Labs   Lab Result Units 07/16/25  1537 07/25/25  1154   TSH uIU/mL 0.970 1.294     A1C:  No results for input(s): "HGBA1C" in the last 2160 hours.    Radiology:        Assessment:     Kristina Graham is a 54 y.o.female with:    Hypoparathyroidism after procedure    Kidney stone    Hypocalcemia    Rheumatoid factor positive          Plan:     Problem List Items Addressed This Visit       Rheumatoid factor positive    Overview   Is following with rheumatology currently.         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  Return to clinic in 6 months.  ' [facility]                       [1]   Social History  Socioeconomic History    Marital status:    Tobacco Use    Smoking status: Never    Smokeless tobacco: Never    Tobacco comments:     drug rep   Substance and Sexual Activity    Alcohol use: Not Currently     Comment: social; none 24 hr prior to surg    Drug use: Never    Sexual activity: Yes     Partners: Male     Birth control/protection: Partner-Vasectomy     Social Drivers of Health     Financial Resource Strain: Low Risk  (6/16/2025)    Overall Financial Resource Strain (CARDIA)     Difficulty of Paying Living Expenses: Not hard at all   Food Insecurity: No Food Insecurity (6/16/2025)    Hunger Vital Sign     Worried About Running Out of Food in the Last Year: Never true     Ran Out of Food in the Last Year: Never true   Transportation Needs: No Transportation Needs (6/16/2025)    PRAPARE - Transportation     Lack of Transportation (Medical): No     Lack of Transportation (Non-Medical): No   Physical Activity: Sufficiently Active (6/16/2025)    Exercise Vital Sign     Days of Exercise per Week: 3 days     Minutes of Exercise per Session: 80 min   Stress: Stress Concern Present (6/16/2025)    [employer] [place] [employer] - Occupational Stress Questionnaire     Feeling of Stress : To some extent   Housing Stability: Low Risk  (6/16/2025)    Housing Stability Vital Sign     Unable to Pay for Housing in the Last Year: No     Number of Times Moved in the Last Year: 0     Homeless in the Last Year: No   [2]   Outpatient Encounter Medications as of 8/1/2025   Medication  Sig Note Dispense Refill    ascorbic acid, vitamin C, (VITAMIN C) 500 MG tablet Take 500 mg by mouth once daily.       atorvastatin (LIPITOR) 10 MG tablet Take 10 mg by mouth once daily.       calcitRIOL (ROCALTROL) 0.5 MCG Cap Take 0.5 mcg by mouth 3 (three) times daily.       calcium carbonate/vitamin D3 (VITAMIN D-3 ORAL) Take by mouth.       cholecalciferol, vitamin D3, 125 mcg (5,000 unit) Tab Take 5,000 Units by mouth.       clindamycin-benzoyl peroxide gel Apply topically every morning.       cyanocobalamin (VITAMIN B-12) 1000 MCG tablet Take 1,000 mcg by mouth.       etodolac (LODINE XL) 400 MG 24 hr tablet TAKE 1 TABLET BY MOUTH EVERY DAY 4/10/2025: PRN 90 tablet 0    lifitegrast (XIIDRA) 5 % Dpet Place 1 drop into both eyes 2 (two) times daily.  60 each 11    magnesium oxide (MAG-OX) 400 mg tablet Take 400 mg by mouth once daily.       methylPREDNISolone (MEDROL DOSEPACK) 4 mg tablet use as directed  21 tablet 0    perfluorohexyloctane, PF, (MIEBO, PF,) 100 % Drop Place 1 drop into both eyes 4 (four) times daily.  3 mL 11    TIROSINT 75 mcg Cap Take 1 capsule by mouth Daily.       tretinoin (RETIN-A) 0.025 % cream SMARTSIG:Sparingly Topical Every Night       VEOZAH 45 mg Tab Take 1 tablet by mouth once daily. 4/10/2025: Hot flashes      [DISCONTINUED] spironolactone (ALDACTONE) 100 MG tablet Take 1 tablet (100 mg total) by mouth once daily.  30 tablet 6    [DISCONTINUED] calcitRIOL (ROCALTROL) 0.25 MCG Cap Take 0.25 mcg by mouth 2 (two) times daily.       [DISCONTINUED] methylPREDNISolone (MEDROL DOSEPACK) 4 mg tablet use as directed  21 tablet 0    [DISCONTINUED] oxyCODONE-acetaminophen (PERCOCET) 5-325 mg per tablet Take 1 tablet by mouth every 4 (four) hours as needed for Pain.  8 tablet 0    [DISCONTINUED] YORVIPATH 294 mcg/0.98 mL PnIj For parathyroid        No facility-administered encounter medications on file as of 8/1/2025.

## 2025-08-04 PROBLEM — M25.521 RIGHT ELBOW PAIN: Status: RESOLVED | Noted: 2025-06-19 | Resolved: 2025-08-04

## (undated) DEVICE — SOL CLEARIFY VISUALIZATION LAP

## (undated) DEVICE — SUT 2/0 30IN SILK BLK BRAI

## (undated) DEVICE — NDL HYPO REG 25G X 1 1/2

## (undated) DEVICE — STOCKINETTE UNBLEACHED 4X25D

## (undated) DEVICE — SEE MEDLINE ITEM 154981

## (undated) DEVICE — SUT MONOCRYL 4-0 PS-2

## (undated) DEVICE — HOOK LONE STAR BLUNT 12MM

## (undated) DEVICE — DRAPE UTILITY STRL 15X26IN

## (undated) DEVICE — SUT VICRYL+ 27 UR-6 VIOL

## (undated) DEVICE — DRESSING XEROFORM NONADH 1X8IN

## (undated) DEVICE — TRAY DO THE ROBOT

## (undated) DEVICE — BLADE SURG #15 CARBON STEEL

## (undated) DEVICE — BLADE MEDIUM LONG 9MM X 31MM

## (undated) DEVICE — SUT VICRYL PLUS 3-0 SH 18IN

## (undated) DEVICE — CLIP MED TICALL

## (undated) DEVICE — POSITIONER HEAD DONUT 9IN FOAM

## (undated) DEVICE — BAG TISSUE RETRIEVAL 225ML

## (undated) DEVICE — SEE MEDLINE ITEM 157194

## (undated) DEVICE — DRAPE EXTREMITY ORTHOMAX

## (undated) DEVICE — SPONGE COTTON TRAY 4X4IN

## (undated) DEVICE — SHEET EENT SPLIT

## (undated) DEVICE — TRAY MINOR GEN SURG

## (undated) DEVICE — GLOVE BIOGEL SKINSENSE PI 7.5

## (undated) DEVICE — KIT WING PAD POSITIONING

## (undated) DEVICE — BLADE SURG STAINLESS STEEL #15

## (undated) DEVICE — TOURNIQUET SB QC DP 34X4IN

## (undated) DEVICE — SEE MEDLINE ITEM 152622

## (undated) DEVICE — SUT VICRYL 4-0 RB1 27IN UD

## (undated) DEVICE — SUT ETHILON 3-0 PS2 18 BLK

## (undated) DEVICE — DRAPE COLUMN DAVINCI XI

## (undated) DEVICE — SOL IRR SOD CHL .9% POUR

## (undated) DEVICE — TOWEL OR NONABSORB ADH 17X26

## (undated) DEVICE — Device

## (undated) DEVICE — APPLICATOR CHLORAPREP ORN 26ML

## (undated) DEVICE — SUT VICRYL 2-0 CT-2 VCP269H

## (undated) DEVICE — ALCOHOL ISOPROPYL BLU 70% 16OZ

## (undated) DEVICE — ELECTRODE REM PLYHSV RETURN 9

## (undated) DEVICE — GLOVE BIOGEL SKINSENSE PI 7.0

## (undated) DEVICE — SUT MONOCRYL PLUS UD 3-0 27

## (undated) DEVICE — HEMOCLIPS GREEN

## (undated) DEVICE — BANDAGE ESMARK ELASTIC ST 6X9

## (undated) DEVICE — SKINMARKER & RULER REGULAR X-F

## (undated) DEVICE — ELECTRODE BLD EXT INSUL 1

## (undated) DEVICE — SUT VICRYL PLUS 4-0 PS2 27

## (undated) DEVICE — PAD CAST SPECIALIST STRL 6

## (undated) DEVICE — COVER LIGHT HANDLE

## (undated) DEVICE — PAD CAST SPECIALIST STRL 4

## (undated) DEVICE — DRAPE ARM DAVINCI XI

## (undated) DEVICE — TROCAR ENDOPATH XCEL 12X100MM

## (undated) DEVICE — ELECTRODE NDL

## (undated) DEVICE — SUT VICRYL 0 27 CT-2

## (undated) DEVICE — OBTURATOR BLADELESS 8MM XI CLR

## (undated) DEVICE — SUT 2-0 12-18IN SILK

## (undated) DEVICE — DRAPE THREE-QTR REINF 53X77IN

## (undated) DEVICE — TUBING INSUFFLATOR W/ROT CONCT

## (undated) DEVICE — SYR 10CC LUER LOCK

## (undated) DEVICE — CHLORAPREP W TINT 26ML APPL

## (undated) DEVICE — SOL ELECTROLUBE ANTI-STIC

## (undated) DEVICE — SEAL CANN UNIVERSAL 5-12MM

## (undated) DEVICE — CORD BIPOLAR 12 FOOT

## (undated) DEVICE — GOWN NONREINF SET-IN SLV XL

## (undated) DEVICE — BLADE SURG STAINLESS STEEL #11

## (undated) DEVICE — WARMER DRAPE STERILE LF

## (undated) DEVICE — PAD UNDERPAD 30X30

## (undated) DEVICE — GLOVE BIOGEL SKINSENSE PI 6.5

## (undated) DEVICE — STOCKINETTE BIAS CUT STRL 4X4Y

## (undated) DEVICE — DRAPE C-ARM MINI DISP

## (undated) DEVICE — GLOVE SENSICARE PI MICRO 7.5

## (undated) DEVICE — CONTAINER SPECIMEN STRL 4OZ

## (undated) DEVICE — ELECTRODE BLADE INSULATED 1 IN

## (undated) DEVICE — SUT LIGACLIP SMALL XTRA

## (undated) DEVICE — SOCKINETTE IMPERVIOUS 12X48IN

## (undated) DEVICE — ADHESIVE MASTISOL VIAL 48/BX

## (undated) DEVICE — SYS SEE SHARP SCP ANTIFG LNG

## (undated) DEVICE — POSITIONER HEEL FOAM CONVOLTD

## (undated) DEVICE — CLOSURE SKIN STERI STRIP 1/2X4

## (undated) DEVICE — BNDG COFLEX FOAM LF2 ST 6X5YD

## (undated) DEVICE — TUBING INSUFFLATION 10

## (undated) DEVICE — PENCIL ELECTROSURG HOLST W/BLD

## (undated) DEVICE — DRAPE INCISE IOBAN 2 23X17IN

## (undated) DEVICE — PROBE SIMULATOR KRAFF

## (undated) DEVICE — GAUZE SPONGE PEANUT STRL

## (undated) DEVICE — SEE MEDLINE ITEM 157128

## (undated) DEVICE — TUBE EMG NIM 7.0MM TRIVANTAGE

## (undated) DEVICE — SUT VICRYL 3-0 27 SH

## (undated) DEVICE — TOWEL OR DISP STRL BLUE 4/PK

## (undated) DEVICE — GOWN ECLIPSE REINF LVL4 TWL LG

## (undated) DEVICE — GLOVE BIOGEL SKINSENSE PI 8.0

## (undated) DEVICE — UNDERGLOVES BIOGEL PI SZ 7 LF

## (undated) DEVICE — GOWN SURGICAL X-LARGE

## (undated) DEVICE — ADHESIVE DERMABOND ADVANCED

## (undated) DEVICE — SUT 3-0 12-18IN SILK